# Patient Record
Sex: FEMALE | Race: WHITE | NOT HISPANIC OR LATINO | Employment: OTHER | URBAN - METROPOLITAN AREA
[De-identification: names, ages, dates, MRNs, and addresses within clinical notes are randomized per-mention and may not be internally consistent; named-entity substitution may affect disease eponyms.]

---

## 2023-08-15 ENCOUNTER — OFFICE VISIT (OUTPATIENT)
Dept: PRIMARY CARE | Facility: CLINIC | Age: 63
End: 2023-08-15
Payer: COMMERCIAL

## 2023-08-15 VITALS
SYSTOLIC BLOOD PRESSURE: 118 MMHG | DIASTOLIC BLOOD PRESSURE: 80 MMHG | OXYGEN SATURATION: 95 % | RESPIRATION RATE: 19 BRPM | TEMPERATURE: 97 F | HEART RATE: 56 BPM

## 2023-08-15 DIAGNOSIS — E66.01 CLASS 3 SEVERE OBESITY DUE TO EXCESS CALORIES WITH SERIOUS COMORBIDITY AND BODY MASS INDEX (BMI) OF 40.0 TO 44.9 IN ADULT (MULTI): ICD-10-CM

## 2023-08-15 DIAGNOSIS — I10 PRIMARY HYPERTENSION: ICD-10-CM

## 2023-08-15 DIAGNOSIS — M45.6 ANKYLOSING SPONDYLITIS OF LUMBAR REGION (MULTI): ICD-10-CM

## 2023-08-15 DIAGNOSIS — E78.2 MIXED HYPERLIPIDEMIA: Primary | ICD-10-CM

## 2023-08-15 DIAGNOSIS — M1A.00X0 CHRONIC IDIOPATHIC GOUT: ICD-10-CM

## 2023-08-15 DIAGNOSIS — I10 ESSENTIAL HYPERTENSION: ICD-10-CM

## 2023-08-15 DIAGNOSIS — Z12.31 ENCOUNTER FOR SCREENING MAMMOGRAM FOR MALIGNANT NEOPLASM OF BREAST: ICD-10-CM

## 2023-08-15 DIAGNOSIS — I47.29 VENTRICULAR TACHYCARDIA, PAROXYSMAL (MULTI): ICD-10-CM

## 2023-08-15 DIAGNOSIS — I44.2 IDIOVENTRICULAR RHYTHM (MULTI): ICD-10-CM

## 2023-08-15 DIAGNOSIS — E03.9 ACQUIRED HYPOTHYROIDISM: ICD-10-CM

## 2023-08-15 PROBLEM — I47.20 VENTRICULAR TACHYCARDIA, PAROXYSMAL: Status: ACTIVE | Noted: 2023-08-15

## 2023-08-15 PROBLEM — I49.3 PVC (PREMATURE VENTRICULAR CONTRACTION): Status: ACTIVE | Noted: 2023-08-15

## 2023-08-15 PROBLEM — R20.0 NUMBNESS OF FACE: Status: ACTIVE | Noted: 2023-08-15

## 2023-08-15 PROBLEM — I25.10 CAD S/P PERCUTANEOUS CORONARY ANGIOPLASTY: Status: ACTIVE | Noted: 2023-08-15

## 2023-08-15 PROBLEM — H90.3 BILATERAL SENSORINEURAL HEARING LOSS: Status: ACTIVE | Noted: 2023-08-15

## 2023-08-15 PROBLEM — Z98.1 HISTORY OF FUSION OF CERVICAL SPINE: Status: ACTIVE | Noted: 2018-01-17

## 2023-08-15 PROBLEM — M43.22 CERVICAL SPINE ANKYLOSIS: Status: ACTIVE | Noted: 2017-12-03

## 2023-08-15 PROBLEM — G89.29 CHRONIC ANKLE PAIN, BILATERAL: Status: ACTIVE | Noted: 2017-11-29

## 2023-08-15 PROBLEM — Z98.61 CAD S/P PERCUTANEOUS CORONARY ANGIOPLASTY: Status: ACTIVE | Noted: 2023-08-15

## 2023-08-15 PROBLEM — I25.10 CORONARY ARTERY DISEASE INVOLVING NATIVE HEART WITHOUT ANGINA PECTORIS: Status: ACTIVE | Noted: 2017-10-03

## 2023-08-15 PROBLEM — E87.6 HYPOKALEMIA: Status: ACTIVE | Noted: 2023-08-15

## 2023-08-15 PROBLEM — K21.9 GASTROESOPHAGEAL REFLUX DISEASE: Status: ACTIVE | Noted: 2018-08-01

## 2023-08-15 PROBLEM — I25.2 STATUS POST MYOCARDIAL INFARCTION: Status: ACTIVE | Noted: 2023-08-15

## 2023-08-15 PROBLEM — G47.33 OBSTRUCTIVE SLEEP APNEA, ADULT: Status: ACTIVE | Noted: 2023-08-15

## 2023-08-15 PROBLEM — E55.9 VITAMIN D DEFICIENCY: Status: ACTIVE | Noted: 2017-11-29

## 2023-08-15 PROBLEM — M25.572 CHRONIC ANKLE PAIN, BILATERAL: Status: ACTIVE | Noted: 2017-11-29

## 2023-08-15 PROBLEM — G89.4 CHRONIC PAIN SYNDROME: Status: ACTIVE | Noted: 2017-11-29

## 2023-08-15 PROBLEM — R09.89 BRUIT OF LEFT CAROTID ARTERY: Status: ACTIVE | Noted: 2023-08-15

## 2023-08-15 PROBLEM — M54.40 LOW BACK PAIN WITH SCIATICA: Status: ACTIVE | Noted: 2023-08-15

## 2023-08-15 PROBLEM — M25.571 CHRONIC ANKLE PAIN, BILATERAL: Status: ACTIVE | Noted: 2017-11-29

## 2023-08-15 PROBLEM — E66.813 CLASS 3 SEVERE OBESITY DUE TO EXCESS CALORIES WITH SERIOUS COMORBIDITY AND BODY MASS INDEX (BMI) OF 40.0 TO 44.9 IN ADULT: Status: ACTIVE | Noted: 2023-08-15

## 2023-08-15 PROBLEM — Q89.9: Status: ACTIVE | Noted: 2023-08-15

## 2023-08-15 PROBLEM — R07.9 CHEST PAIN: Status: ACTIVE | Noted: 2023-08-15

## 2023-08-15 PROBLEM — H61.20 CERUMEN IMPACTION: Status: ACTIVE | Noted: 2023-08-15

## 2023-08-15 PROCEDURE — 99202 OFFICE O/P NEW SF 15 MIN: CPT | Performed by: FAMILY MEDICINE

## 2023-08-15 PROCEDURE — 3079F DIAST BP 80-89 MM HG: CPT | Performed by: FAMILY MEDICINE

## 2023-08-15 PROCEDURE — 3008F BODY MASS INDEX DOCD: CPT | Performed by: FAMILY MEDICINE

## 2023-08-15 PROCEDURE — 1036F TOBACCO NON-USER: CPT | Performed by: FAMILY MEDICINE

## 2023-08-15 PROCEDURE — 3074F SYST BP LT 130 MM HG: CPT | Performed by: FAMILY MEDICINE

## 2023-08-15 RX ORDER — ATORVASTATIN CALCIUM 80 MG/1
80 TABLET, FILM COATED ORAL DAILY
COMMUNITY

## 2023-08-15 RX ORDER — LEVOTHYROXINE SODIUM 137 UG/1
137 TABLET ORAL DAILY
COMMUNITY
End: 2023-10-20 | Stop reason: DRUGHIGH

## 2023-08-15 RX ORDER — ALLOPURINOL 300 MG/1
300 TABLET ORAL DAILY
COMMUNITY

## 2023-08-15 RX ORDER — ACETAMINOPHEN 325 MG/1
650 TABLET ORAL 2 TIMES DAILY
COMMUNITY

## 2023-08-15 RX ORDER — NAPROXEN SODIUM 220 MG/1
81 TABLET, FILM COATED ORAL
COMMUNITY

## 2023-08-15 RX ORDER — PANTOPRAZOLE SODIUM 40 MG/1
40 TABLET, DELAYED RELEASE ORAL DAILY
COMMUNITY

## 2023-08-15 RX ORDER — HYDROCHLOROTHIAZIDE 25 MG/1
25 TABLET ORAL DAILY
COMMUNITY
End: 2024-02-16 | Stop reason: SDUPTHER

## 2023-08-15 RX ORDER — ISOSORBIDE MONONITRATE 30 MG/1
30 TABLET, EXTENDED RELEASE ORAL DAILY
COMMUNITY

## 2023-08-15 RX ORDER — POTASSIUM CHLORIDE 750 MG/1
10 TABLET, EXTENDED RELEASE ORAL 2 TIMES DAILY
COMMUNITY

## 2023-08-15 RX ORDER — MELOXICAM 15 MG/1
15 TABLET ORAL DAILY
COMMUNITY

## 2023-08-15 RX ORDER — METOPROLOL TARTRATE 25 MG/1
25 TABLET, FILM COATED ORAL
COMMUNITY
End: 2024-01-04 | Stop reason: SDUPTHER

## 2023-08-15 RX ORDER — NITROGLYCERIN 0.4 MG/1
0.4 TABLET SUBLINGUAL AS NEEDED
COMMUNITY
End: 2024-05-23 | Stop reason: SDUPTHER

## 2023-08-15 RX ORDER — LISINOPRIL 5 MG/1
5 TABLET ORAL DAILY
COMMUNITY
End: 2024-02-16 | Stop reason: SDUPTHER

## 2023-08-15 RX ORDER — ALBUTEROL SULFATE 90 UG/1
2 AEROSOL, METERED RESPIRATORY (INHALATION) EVERY 4 HOURS PRN
COMMUNITY

## 2023-08-15 RX ORDER — DULOXETIN HYDROCHLORIDE 60 MG/1
60 CAPSULE, DELAYED RELEASE ORAL DAILY
COMMUNITY

## 2023-08-15 ASSESSMENT — PATIENT HEALTH QUESTIONNAIRE - PHQ9
SUM OF ALL RESPONSES TO PHQ9 QUESTIONS 1 AND 2: 0
2. FEELING DOWN, DEPRESSED OR HOPELESS: NOT AT ALL
1. LITTLE INTEREST OR PLEASURE IN DOING THINGS: NOT AT ALL

## 2023-08-15 NOTE — PATIENT INSTRUCTIONS
BMI was above normal measurement. Current weight:    Weight change since last visit (-) denotes wt loss     Weight loss needed to achieve BMI 25:   Lbs  Weight loss needed to achieve BMI 30:   Lbs  Provided instructions on dietary changes  Provided instructions on exercise  Advised to Increase physical activity.

## 2023-08-15 NOTE — PROGRESS NOTES
Chief Complaint   Patient presents with    [facility]     New Patient visit       She  presents to establish care and an annual physical exam and other chronic medical conditions.    Patient is here for follow-up on hypertension and hyperlipidemia. She is not exercising and is adherent to a low-salt diet. Patient denies chest pain, dyspnea, exertional chest pressure/discomfort, near-syncope, orthopnea, palpitations, paroxysmal nocturnal dyspnea, and syncope. Taking his medication regularly with no side effects.     Patient Active Problem List   Diagnosis    Bilateral sensorineural hearing loss    Bruit of left carotid artery    CAD S/P percutaneous coronary angioplasty    Cerumen impaction    Chest pain    Hypokalemia    Idioventricular rhythm (CMS/HCC)    Low back pain with sciatica    Malformation    Mixed hyperlipidemia    Numbness of face    Obstructive sleep apnea, adult    Primary hypertension    PVC (premature ventricular contraction)    Status post myocardial infarction    Ventricular tachycardia, paroxysmal (CMS/HCC)    Class 3 severe obesity due to excess calories with serious comorbidity and body mass index (BMI) of 40.0 to 44.9 in adult (CMS/HCC)    Ankylosing spondylitis (CMS/HCC)    Cervical spine ankylosis    Spinal stenosis in cervical region    Cervicalgia    Chronic ankle pain, bilateral    Chronic pain syndrome    Congenital fusion of spine (vertebra)    Coronary artery disease involving native heart without angina pectoris    Degeneration of cervical intervertebral disc    Gastroesophageal reflux disease    History of fusion of cervical spine    Hypothyroidism    Vitamin D deficiency     has a current medication list which includes the following prescription(s): acetaminophen, albuterol, allopurinol, aspirin, atorvastatin, duloxetine, hydrochlorothiazide, isosorbide mononitrate er, levothyroxine, lisinopril, meloxicam, metoprolol tartrate, nitroglycerin, pantoprazole, and potassium chloride  cr.    No past medical history on file.    Past Surgical History:   Procedure Laterality Date    OTHER SURGICAL HISTORY  01/13/2022    Surgery    OTHER SURGICAL HISTORY  01/13/2022    Cardiac catheterization with stent placement    OTHER SURGICAL HISTORY  01/13/2022    Colonoscopy    OTHER SURGICAL HISTORY  01/13/2022    Median nerve neuroplasty    OTHER SURGICAL HISTORY  01/13/2022    Percutaneous transluminal coronary angioplasty    OTHER SURGICAL HISTORY  01/13/2022    Nephrectomy    OTHER SURGICAL HISTORY  01/13/2022    Neck surgery    OTHER SURGICAL HISTORY  01/13/2022    Foot surgery    OTHER SURGICAL HISTORY  01/13/2022    Tubal ligation     family history is not on file.    Allergies   Allergen Reactions    Hydrocodone Other    Iodine Other    Penicillins Hives     Review of Systems -   General ROS: negative for - fatigue, fever, malaise, night sweats or weight loss  Eyes ROS no blurred vision, no double vision, no eye discharge and no eye redness.  ENT ROS: negative for - hearing change, nasal discharge, oral lesions, sinus pain, sore throat, tinnitus or vertigo  Respiratory ROS: negative for - cough, hemoptysis, pleuritic pain, shortness of breath or wheezing  Cardiovascular ROS: no chest pain or dyspnea on exertion, palpitations, PND or orthopnea.  No syncope.  Gastrointestinal ROS: no abdominal pain, change in bowel habits, or black or bloody stools  Genito-Urinary ROS: no dysuria, trouble voiding, or hematuria  Dermatological ROS: negative for - dry skin, lumps, pruritus or rash  Neurological ROS: negative for - dizziness, gait disturbance, headaches, impaired coordination/balance, numbness/tingling, tremors or visual changes  Endocrine ROS: negative for - hot flashes, malaise/lethargy, palpitations, polydipsia/polyuria, skin changes, temperature intolerance   Hematological and Lymphatic ROS: negative for - bruising, night sweats or pallor    Blood pressure 118/80, pulse 56, temperature 36.1 °C (97 °F),  resp. rate 19, SpO2 95 %.    Physical Examination:   General appearance - alert, well appearing, and in no distress  HEENT EOMI, PEERLA, normal eyelids.  Oropharynx no erythema or exudate.  Normal tonsils.    Ears -external auditory canal normal bilaterally.  Tympanic membranes normal bilaterally.  Mouth - mucous membranes moist, pharynx normal without lesions  Neck - supple, trachea central no thyromegaly.  No significant adenopathy  Chest -good air entry bilaterally, no rhonchi rales and no wheezes.  Heart -Normal S1 and S2, regular rate and rhythm with no murmurs gallop or rub.  Abdomen -Non distended, soft, nontender with no guarding, no palpable mass and no CVA tenderness.  Bowel sounds normal.  No hernia.  Skin no rash, nonicteric and warm to touch.  Neurological - alert, oriented, cranial nerves II through XII normal.  Reflexes 2+ bilaterally.  No focal deficit.  Musculoskeletal - no joint tenderness, deformity or swelling  Extremities: peripheral pulses normal, no clubbing or cyanosis.    Problem List Items Addressed This Visit       Idioventricular rhythm (CMS/HCC)    Current Assessment & Plan     Chronic Condition Documentation : Stable based on symptoms and exam.  Continue established treatment plan and follow-up at least yearly.           Relevant Medications    nitroglycerin (Nitrostat) 0.4 mg SL tablet    metoprolol tartrate (Lopressor) 25 mg tablet    isosorbide mononitrate ER (Imdur) 30 mg 24 hr tablet    Mixed hyperlipidemia - Primary    Current Assessment & Plan     The nature of cardiac risk has been fully discussed with this patient. Discussed cardiovascular risk analysis and appropriate diet with the need for lifelong measures to reduce the risk. A regular exercise program is recommended to help achieve and maintain normal body weight, fitness and improve lipid balance. Patient education provided. They understand and agree with this course of treatment. They will return with new or worsening  "symptoms. Patient instructed to remain current with appropriate annual health maintenance.            Relevant Orders    CBC and Auto Differential    Comprehensive metabolic panel    Lipid panel    Follow Up In [facility] - PCP - Established    Primary hypertension    Current Assessment & Plan     Dietary sodium restriction.  Regular aerobic exercise program is recommended to help achieve and maintain normal body weight, fitness and improve lipid balance. .  Dietary changes: Increase soluble fiber  Plant sterols 2grams per day (e.g. Benecol)  Reduce saturated fat, \"trans\" monounsaturated fatty acids, and cholesterol           Relevant Orders    CBC and Auto Differential    Comprehensive metabolic panel    Lipid panel    Follow Up In [facility] - PCP - Established    Ventricular tachycardia, paroxysmal (CMS/HCC)    Current Assessment & Plan     Chronic Condition Documentation : Stable based on symptoms and exam.  Continue established treatment plan and follow-up at least yearly.           Relevant Medications    nitroglycerin (Nitrostat) 0.4 mg SL tablet    metoprolol tartrate (Lopressor) 25 mg tablet    isosorbide mononitrate ER (Imdur) 30 mg 24 hr tablet    Class 3 severe obesity due to excess calories with serious comorbidity and body mass index (BMI) of 40.0 to 44.9 in adult (CMS/[facility])    Current Assessment & Plan     Continue decrease calorie diet and not more than 1500 calorie per day diet and low-fat diet.  Continue with regular exercise program.  We advised exercise at least 5 days a week for at least 45 minutes and also a minimum of 10,000 steps a day.  The detrimental effects of obesity on health were discussed.           Ankylosing spondylitis (CMS/[facility])    Current Assessment & Plan     Chronic Condition Documentation : Stable based on symptoms and exam.  Continue established treatment plan and follow-up at least yearly.           Hypothyroidism    Current Assessment & Plan     Will " wait to see TSH and T4 result before changing the Levothyroxine dose.         Relevant Orders    TSH    Thyroxine, Free    Follow Up In [facility] - PCP - Established     Other Visit Diagnoses       Encounter for screening mammogram for malignant neoplasm of breast        Relevant Orders    BI mammo bilateral screening tomosynthesis    Chronic idiopathic gout        Essential hypertension               Outpatient Encounter Medications as of 8/15/2023   Medication Sig Dispense Refill    acetaminophen (Tylenol) 325 mg tablet Take 2 tablets (650 mg) by mouth 2 times a day.      albuterol 90 mcg/actuation inhaler Inhale 2 puffs every 4 hours if needed for wheezing or shortness of breath.      allopurinol (Zyloprim) 300 mg tablet Take 1 tablet (300 mg) by mouth once daily.      aspirin 81 mg chewable tablet Chew 1 tablet (81 mg) once daily.      atorvastatin (Lipitor) 80 mg tablet Take 1 tablet (80 mg) by mouth once daily.      DULoxetine (Cymbalta) 60 mg DR capsule Take 1 capsule (60 mg) by mouth once daily.      hydroCHLOROthiazide (HYDRODiuril) 25 mg tablet Take 1 tablet (25 mg) by mouth once daily.      isosorbide mononitrate ER (Imdur) 30 mg 24 hr tablet Take 1 tablet (30 mg) by mouth once daily.      levothyroxine (Synthroid, Levoxyl) 137 mcg tablet Take 1 tablet (137 mcg) by mouth once daily.      lisinopril 5 mg tablet Take 1 tablet (5 mg) by mouth once daily.      meloxicam (Mobic) 15 mg tablet Take 1 tablet (15 mg) by mouth once daily.      metoprolol tartrate (Lopressor) 25 mg tablet Take 1 tablet (25 mg) by mouth 2 times a day.      nitroglycerin (Nitrostat) 0.4 mg SL tablet Place 1 tablet (0.4 mg) under the tongue if needed for chest pain.      pantoprazole (ProtoNix) 40 mg EC tablet Take 1 tablet (40 mg) by mouth once daily.      potassium chloride CR 10 mEq ER tablet Take 1 tablet (10 mEq) by mouth once daily.       No facility-administered encounter medications on file as of 8/15/2023.     The  nature of cardiac risk has been fully discussed with this patient. I have made  aware of  LDL target goal given  cardiovascular risk analysis. I have discussed the appropriate diet. The need for lifelong compliance in order to reduce risk is stressed. A regular exercise program is recommended to help achieve and maintain normal body weight, fitness and improve lipid balance. A written copy of a low fat, low cholesterol diet has been given to the patient.    Patient education provided. They understand and agree with this course of treatment. They will return with new or worsening symptoms. Patient instructed to remain current with appropriate annual health maintenance.     Scribe Attestation  By signing my name below, IHiral Scribe   attest that this documentation has been prepared under the direction and in the presence of Niall Miranda MD.

## 2023-10-09 ENCOUNTER — HOSPITAL ENCOUNTER (OUTPATIENT)
Dept: RADIOLOGY | Facility: HOSPITAL | Age: 63
Discharge: HOME | End: 2023-10-09
Payer: MEDICARE

## 2023-10-09 DIAGNOSIS — Z12.31 ENCOUNTER FOR SCREENING MAMMOGRAM FOR MALIGNANT NEOPLASM OF BREAST: ICD-10-CM

## 2023-10-09 PROCEDURE — 77067 SCR MAMMO BI INCL CAD: CPT | Mod: 50

## 2023-10-09 PROCEDURE — 77063 BREAST TOMOSYNTHESIS BI: CPT | Mod: 50

## 2023-10-09 PROCEDURE — 77067 SCR MAMMO BI INCL CAD: CPT | Mod: BILATERAL PROCEDURE | Performed by: RADIOLOGY

## 2023-10-09 PROCEDURE — 77063 BREAST TOMOSYNTHESIS BI: CPT | Mod: BILATERAL PROCEDURE | Performed by: RADIOLOGY

## 2023-10-10 ENCOUNTER — HOSPITAL ENCOUNTER (OUTPATIENT)
Dept: RADIOLOGY | Facility: EXTERNAL LOCATION | Age: 63
Discharge: HOME | End: 2023-10-10
Payer: MEDICARE

## 2023-10-10 DIAGNOSIS — Z12.31 ENCOUNTER FOR SCREENING MAMMOGRAM FOR MALIGNANT NEOPLASM OF BREAST: ICD-10-CM

## 2023-10-16 ENCOUNTER — APPOINTMENT (OUTPATIENT)
Dept: PRIMARY CARE | Facility: CLINIC | Age: 63
End: 2023-10-16
Payer: MEDICARE

## 2023-10-16 ENCOUNTER — LAB (OUTPATIENT)
Dept: LAB | Facility: LAB | Age: 63
End: 2023-10-16
Payer: MEDICARE

## 2023-10-16 DIAGNOSIS — E03.9 ACQUIRED HYPOTHYROIDISM: ICD-10-CM

## 2023-10-16 DIAGNOSIS — E78.2 MIXED HYPERLIPIDEMIA: ICD-10-CM

## 2023-10-16 DIAGNOSIS — I10 PRIMARY HYPERTENSION: ICD-10-CM

## 2023-10-16 LAB
ALBUMIN SERPL BCP-MCNC: 4 G/DL (ref 3.4–5)
ALP SERPL-CCNC: 72 U/L (ref 33–136)
ALT SERPL W P-5'-P-CCNC: 15 U/L (ref 7–45)
ANION GAP SERPL CALC-SCNC: 11 MMOL/L (ref 10–20)
AST SERPL W P-5'-P-CCNC: 14 U/L (ref 9–39)
BASOPHILS # BLD AUTO: 0.07 X10*3/UL (ref 0–0.1)
BASOPHILS NFR BLD AUTO: 1.2 %
BILIRUB SERPL-MCNC: 1.1 MG/DL (ref 0–1.2)
BUN SERPL-MCNC: 21 MG/DL (ref 6–23)
CALCIUM SERPL-MCNC: 9.7 MG/DL (ref 8.6–10.3)
CHLORIDE SERPL-SCNC: 103 MMOL/L (ref 98–107)
CHOLEST SERPL-MCNC: 133 MG/DL (ref 0–199)
CHOLESTEROL/HDL RATIO: 3.4
CO2 SERPL-SCNC: 29 MMOL/L (ref 21–32)
CREAT SERPL-MCNC: 1.01 MG/DL (ref 0.5–1.05)
EOSINOPHIL # BLD AUTO: 0.21 X10*3/UL (ref 0–0.7)
EOSINOPHIL NFR BLD AUTO: 3.6 %
ERYTHROCYTE [DISTWIDTH] IN BLOOD BY AUTOMATED COUNT: 14.3 % (ref 11.5–14.5)
GFR SERPL CREATININE-BSD FRML MDRD: 62 ML/MIN/1.73M*2
GLUCOSE SERPL-MCNC: 108 MG/DL (ref 74–99)
HCT VFR BLD AUTO: 38.9 % (ref 36–46)
HDLC SERPL-MCNC: 38.8 MG/DL
HGB BLD-MCNC: 12.3 G/DL (ref 12–16)
IMM GRANULOCYTES # BLD AUTO: 0.01 X10*3/UL (ref 0–0.7)
IMM GRANULOCYTES NFR BLD AUTO: 0.2 % (ref 0–0.9)
LDLC SERPL CALC-MCNC: 44 MG/DL
LYMPHOCYTES # BLD AUTO: 2.01 X10*3/UL (ref 1.2–4.8)
LYMPHOCYTES NFR BLD AUTO: 34.2 %
MCH RBC QN AUTO: 30.4 PG (ref 26–34)
MCHC RBC AUTO-ENTMCNC: 31.6 G/DL (ref 32–36)
MCV RBC AUTO: 96 FL (ref 80–100)
MONOCYTES # BLD AUTO: 0.41 X10*3/UL (ref 0.1–1)
MONOCYTES NFR BLD AUTO: 7 %
NEUTROPHILS # BLD AUTO: 3.17 X10*3/UL (ref 1.2–7.7)
NEUTROPHILS NFR BLD AUTO: 53.8 %
NON HDL CHOLESTEROL: 94 MG/DL (ref 0–149)
NRBC BLD-RTO: 0 /100 WBCS (ref 0–0)
PLATELET # BLD AUTO: 256 X10*3/UL (ref 150–450)
PMV BLD AUTO: 11.3 FL (ref 7.5–11.5)
POTASSIUM SERPL-SCNC: 4.3 MMOL/L (ref 3.5–5.3)
PROT SERPL-MCNC: 7.1 G/DL (ref 6.4–8.2)
RBC # BLD AUTO: 4.04 X10*6/UL (ref 4–5.2)
SODIUM SERPL-SCNC: 139 MMOL/L (ref 136–145)
T4 FREE SERPL-MCNC: 1.27 NG/DL (ref 0.61–1.12)
TRIGL SERPL-MCNC: 251 MG/DL (ref 0–149)
TSH SERPL-ACNC: 0.01 MIU/L (ref 0.44–3.98)
VLDL: 50 MG/DL (ref 0–40)
WBC # BLD AUTO: 5.9 X10*3/UL (ref 4.4–11.3)

## 2023-10-16 PROCEDURE — 36415 COLL VENOUS BLD VENIPUNCTURE: CPT

## 2023-10-16 PROCEDURE — 85025 COMPLETE CBC W/AUTO DIFF WBC: CPT

## 2023-10-16 PROCEDURE — 80053 COMPREHEN METABOLIC PANEL: CPT

## 2023-10-16 PROCEDURE — 84439 ASSAY OF FREE THYROXINE: CPT

## 2023-10-16 PROCEDURE — 80061 LIPID PANEL: CPT

## 2023-10-16 PROCEDURE — 84443 ASSAY THYROID STIM HORMONE: CPT

## 2023-10-20 ENCOUNTER — OFFICE VISIT (OUTPATIENT)
Dept: PRIMARY CARE | Facility: CLINIC | Age: 63
End: 2023-10-20
Payer: MEDICARE

## 2023-10-20 VITALS
WEIGHT: 242.4 LBS | OXYGEN SATURATION: 97 % | HEIGHT: 64 IN | RESPIRATION RATE: 15 BRPM | SYSTOLIC BLOOD PRESSURE: 124 MMHG | BODY MASS INDEX: 41.38 KG/M2 | TEMPERATURE: 97.1 F | DIASTOLIC BLOOD PRESSURE: 64 MMHG | HEART RATE: 61 BPM

## 2023-10-20 DIAGNOSIS — E78.2 MIXED HYPERLIPIDEMIA: ICD-10-CM

## 2023-10-20 DIAGNOSIS — I10 ESSENTIAL HYPERTENSION: ICD-10-CM

## 2023-10-20 DIAGNOSIS — E66.01 MORBID OBESITY WITH BMI OF 40.0-44.9, ADULT (MULTI): ICD-10-CM

## 2023-10-20 DIAGNOSIS — M20.41 HAMMER TOE OF RIGHT FOOT: ICD-10-CM

## 2023-10-20 DIAGNOSIS — E03.9 ACQUIRED HYPOTHYROIDISM: ICD-10-CM

## 2023-10-20 DIAGNOSIS — Z00.00 WELCOME TO MEDICARE PREVENTIVE VISIT: Primary | ICD-10-CM

## 2023-10-20 DIAGNOSIS — R20.0 NUMBNESS AND TINGLING OF BOTH FEET: ICD-10-CM

## 2023-10-20 DIAGNOSIS — G47.33 OBSTRUCTIVE SLEEP APNEA ON CPAP: ICD-10-CM

## 2023-10-20 DIAGNOSIS — D22.9 MULTIPLE NEVI: ICD-10-CM

## 2023-10-20 DIAGNOSIS — R20.2 NUMBNESS AND TINGLING OF BOTH FEET: ICD-10-CM

## 2023-10-20 DIAGNOSIS — Z23 NEED FOR INFLUENZA VACCINATION: ICD-10-CM

## 2023-10-20 PROCEDURE — G0008 ADMIN INFLUENZA VIRUS VAC: HCPCS | Performed by: FAMILY MEDICINE

## 2023-10-20 PROCEDURE — 3008F BODY MASS INDEX DOCD: CPT | Performed by: FAMILY MEDICINE

## 2023-10-20 PROCEDURE — 3078F DIAST BP <80 MM HG: CPT | Performed by: FAMILY MEDICINE

## 2023-10-20 PROCEDURE — 3074F SYST BP LT 130 MM HG: CPT | Performed by: FAMILY MEDICINE

## 2023-10-20 PROCEDURE — 90686 IIV4 VACC NO PRSV 0.5 ML IM: CPT | Performed by: FAMILY MEDICINE

## 2023-10-20 PROCEDURE — 1036F TOBACCO NON-USER: CPT | Performed by: FAMILY MEDICINE

## 2023-10-20 PROCEDURE — 99214 OFFICE O/P EST MOD 30 MIN: CPT | Performed by: FAMILY MEDICINE

## 2023-10-20 PROCEDURE — G0402 INITIAL PREVENTIVE EXAM: HCPCS | Performed by: FAMILY MEDICINE

## 2023-10-20 RX ORDER — LEVOTHYROXINE SODIUM 125 UG/1
125 TABLET ORAL DAILY
Qty: 30 TABLET | Refills: 3 | Status: SHIPPED | OUTPATIENT
Start: 2023-10-20 | End: 2024-02-05 | Stop reason: SDUPTHER

## 2023-10-20 ASSESSMENT — VISUAL ACUITY
OS_CC: 20/15
OD_CC: 20/10

## 2023-10-20 ASSESSMENT — ENCOUNTER SYMPTOMS
DEPRESSION: 0
LOSS OF SENSATION IN FEET: 1
OCCASIONAL FEELINGS OF UNSTEADINESS: 1

## 2023-10-20 ASSESSMENT — PATIENT HEALTH QUESTIONNAIRE - PHQ9
2. FEELING DOWN, DEPRESSED OR HOPELESS: NOT AT ALL
1. LITTLE INTEREST OR PLEASURE IN DOING THINGS: NOT AT ALL
SUM OF ALL RESPONSES TO PHQ9 QUESTIONS 1 AND 2: 0

## 2023-10-20 ASSESSMENT — ACTIVITIES OF DAILY LIVING (ADL)
DRESSING: INDEPENDENT
BATHING: INDEPENDENT
GROCERY_SHOPPING: INDEPENDENT
DOING_HOUSEWORK: INDEPENDENT
MANAGING_FINANCES: INDEPENDENT
TAKING_MEDICATION: INDEPENDENT

## 2023-10-20 NOTE — PATIENT INSTRUCTIONS
BMI was above normal measurement. Current weight: 110 kg (242 lb 6.4 oz)  Weight change since last visit (-) denotes wt loss 7.4 lbs   Weight loss needed to achieve BMI 25: 97.1 Lbs  Weight loss needed to achieve BMI 30: 68 Lbs  Advised to Increase physical activity.

## 2023-10-20 NOTE — ASSESSMENT & PLAN NOTE
We will reduce the dose of the Levothyroxine to 125 mcg daily and recheck her thyroid function in 3 months.

## 2023-11-21 ENCOUNTER — OFFICE VISIT (OUTPATIENT)
Dept: CARDIOLOGY | Facility: CLINIC | Age: 63
End: 2023-11-21
Payer: MEDICARE

## 2023-11-21 VITALS
HEART RATE: 64 BPM | BODY MASS INDEX: 41.68 KG/M2 | DIASTOLIC BLOOD PRESSURE: 80 MMHG | WEIGHT: 242.8 LBS | SYSTOLIC BLOOD PRESSURE: 124 MMHG

## 2023-11-21 DIAGNOSIS — E78.2 MIXED HYPERLIPIDEMIA: ICD-10-CM

## 2023-11-21 DIAGNOSIS — E66.01 MORBID OBESITY WITH BMI OF 40.0-44.9, ADULT (MULTI): ICD-10-CM

## 2023-11-21 DIAGNOSIS — I47.29 VENTRICULAR TACHYCARDIA, PAROXYSMAL (MULTI): ICD-10-CM

## 2023-11-21 DIAGNOSIS — Z98.61 CAD S/P PERCUTANEOUS CORONARY ANGIOPLASTY: ICD-10-CM

## 2023-11-21 DIAGNOSIS — I25.2 STATUS POST MYOCARDIAL INFARCTION: ICD-10-CM

## 2023-11-21 DIAGNOSIS — I49.3 PVC (PREMATURE VENTRICULAR CONTRACTION): ICD-10-CM

## 2023-11-21 DIAGNOSIS — G47.33 OBSTRUCTIVE SLEEP APNEA ON CPAP: ICD-10-CM

## 2023-11-21 DIAGNOSIS — I25.10 CAD S/P PERCUTANEOUS CORONARY ANGIOPLASTY: ICD-10-CM

## 2023-11-21 DIAGNOSIS — Z78.9 NEVER SMOKED ANY SUBSTANCE: ICD-10-CM

## 2023-11-21 DIAGNOSIS — I10 ESSENTIAL HYPERTENSION: ICD-10-CM

## 2023-11-21 PROCEDURE — 1160F RVW MEDS BY RX/DR IN RCRD: CPT | Performed by: INTERNAL MEDICINE

## 2023-11-21 PROCEDURE — 3008F BODY MASS INDEX DOCD: CPT | Performed by: INTERNAL MEDICINE

## 2023-11-21 PROCEDURE — 1036F TOBACCO NON-USER: CPT | Performed by: INTERNAL MEDICINE

## 2023-11-21 PROCEDURE — 1159F MED LIST DOCD IN RCRD: CPT | Performed by: INTERNAL MEDICINE

## 2023-11-21 PROCEDURE — 3074F SYST BP LT 130 MM HG: CPT | Performed by: INTERNAL MEDICINE

## 2023-11-21 PROCEDURE — 99213 OFFICE O/P EST LOW 20 MIN: CPT | Performed by: INTERNAL MEDICINE

## 2023-11-21 PROCEDURE — 3079F DIAST BP 80-89 MM HG: CPT | Performed by: INTERNAL MEDICINE

## 2023-11-21 NOTE — PATIENT INSTRUCTIONS
Patient to follow up in 6 months with Dr. Sunny Byrnes MD [facility]    [place] with your trip!  Continue plan with Dr. Rome in January.     Continue same medications and treatments.   Patient educated on proper medication use.   Patient educated on risk factor modification.   Please bring any lab results from other providers / physicians to your next appointment.     Please bring all medicines, vitamins, and herbal supplements with you when you come to the office.     Prescriptions will not be filled unless you are compliant with your follow up appointments or have a follow up appointment scheduled as per instruction of your physician. Refills should be requested at the time of your visit.    Nate HERNADEZ RN am scribing for and in the presence of Dr. Sunny Byrnes MD [facility]

## 2023-11-21 NOTE — PROGRESS NOTES
Referred by Dr. Harper ref. provider found provider found for   Chief Complaint   Patient presents with    Follow-up     6 month        History of Present Illness  Kiara Ordaz is a 63 y.o. year old female patient with history of coronary artery disease status post post previous coronary angioplasty with stent [place].  She does have sleep apnea has been managed by pulmonary physician.  Did not have any symptoms of chest pain or shortness of breath.  She is going on trip on a cruise next week.  I discussed with the patient in length we will continue current medical management will call for any problem and follow-up as scheduled    Past Medical History  No past medical history on file.    Social History  Social History     Tobacco Use    Smoking status: Never    Smokeless tobacco: Never   Substance Use Topics    Alcohol use: Never    Drug use: Never       Family History     Family History   Problem Relation Name Age of Onset    Hypertension Mother      Other (coronary bypass) Mother      Coronary artery disease Mother      Lymphoma Father      Stroke Sister      Hypertension Sister      Diabetes type II Sister      Other (pacemaker) Sister      Stroke Brother      Coronary artery disease Brother      Diabetes type II Brother      Hypertension Brother         Review of Systems  As per HPI, all other systems reviewed and negative.    Allergies:  Allergies   Allergen Reactions    Hydrocodone Other    Iodine Other    Penicillins Hives        Outpatient Medications:  Current Outpatient Medications   Medication Instructions    acetaminophen (TYLENOL) 650 mg, oral, 2 times daily    albuterol 90 mcg/actuation inhaler 2 puffs, inhalation, Every 4 hours PRN    allopurinol (ZYLOPRIM) 300 mg, oral, Daily    aspirin 81 mg, oral, Daily RT    atorvastatin (LIPITOR) 80 mg, oral, Daily    DULoxetine (CYMBALTA) 60 mg, oral, Daily    hydroCHLOROthiazide (HYDRODIURIL) 25 mg, oral, Daily    isosorbide mononitrate ER (IMDUR) 30 mg,  oral, Daily    levothyroxine (SYNTHROID, LEVOXYL) 125 mcg, oral, Daily    lisinopril 5 mg, oral, Daily    meloxicam (MOBIC) 15 mg, oral, Daily    metoprolol tartrate (LOPRESSOR) 25 mg, oral, Take 2 tablets in am and 1 tablet in pm    nitroglycerin (NITROSTAT) 0.4 mg, sublingual, As needed    pantoprazole (PROTONIX) 40 mg, oral, Daily    potassium chloride CR 10 mEq ER tablet 10 mEq, oral, 2 times daily         Vitals:  Vitals:    11/21/23 1008   BP: 124/80   Pulse: 64       Physical Exam:  Physical Exam  Vitals and nursing note reviewed.   Constitutional:       Appearance: Normal appearance. She is normal weight.   HENT:      Head: Normocephalic and atraumatic.   Eyes:      Extraocular Movements: Extraocular movements intact.      Pupils: Pupils are equal, round, and reactive to light.   Cardiovascular:      Rate and Rhythm: Normal rate and regular rhythm.      Pulses: Normal pulses.   Pulmonary:      Effort: Pulmonary effort is normal.      Breath sounds: Normal breath sounds.   Musculoskeletal:      Cervical back: Normal range of motion.      Right lower leg: No edema.      Left lower leg: No edema.   Skin:     General: Skin is warm and dry.   Neurological:      General: No focal deficit present.      Mental Status: She is alert and oriented to person, place, and time.             Assessment/Plan   Diagnoses and all orders for this visit:  CAD S/P percutaneous coronary angioplasty  Essential hypertension  Mixed hyperlipidemia  Status post myocardial infarction  PVC (premature ventricular contraction)  Ventricular tachycardia, paroxysmal (CMS/HCC)  Morbid obesity with BMI of 40.0-44.9, adult (CMS/HCC)  Obstructive sleep apnea on CPAP  Never smoked any substance          Sunny Byrnes MD [facility]   [facility]     Thank you for allowing me to participate in the care of this patient. Please do not hesitate to contact me with any further questions or concerns.

## 2023-12-14 ENCOUNTER — HOSPITAL ENCOUNTER (OUTPATIENT)
Dept: NEUROLOGY | Facility: CLINIC | Age: 63
Discharge: HOME | End: 2023-12-14
Payer: MEDICARE

## 2023-12-14 DIAGNOSIS — R20.0 NUMBNESS AND TINGLING OF BOTH FEET: ICD-10-CM

## 2023-12-14 DIAGNOSIS — R20.2 NUMBNESS AND TINGLING OF BOTH FEET: ICD-10-CM

## 2023-12-14 PROCEDURE — 95912 NRV CNDJ TEST 11-12 STUDIES: CPT | Performed by: SPECIALIST

## 2023-12-14 PROCEDURE — 95886 MUSC TEST DONE W/N TEST COMP: CPT | Mod: 50 | Performed by: SPECIALIST

## 2023-12-14 PROCEDURE — 95886 MUSC TEST DONE W/N TEST COMP: CPT | Performed by: SPECIALIST

## 2024-01-03 ENCOUNTER — APPOINTMENT (OUTPATIENT)
Dept: PODIATRY | Facility: CLINIC | Age: 64
End: 2024-01-03
Payer: MEDICARE

## 2024-01-04 ENCOUNTER — APPOINTMENT (OUTPATIENT)
Dept: SLEEP MEDICINE | Facility: HOSPITAL | Age: 64
End: 2024-01-04
Payer: MEDICARE

## 2024-01-04 DIAGNOSIS — Z98.61 CAD S/P PERCUTANEOUS CORONARY ANGIOPLASTY: ICD-10-CM

## 2024-01-04 DIAGNOSIS — I10 ESSENTIAL HYPERTENSION: ICD-10-CM

## 2024-01-04 DIAGNOSIS — I25.10 CAD S/P PERCUTANEOUS CORONARY ANGIOPLASTY: ICD-10-CM

## 2024-01-04 RX ORDER — METOPROLOL TARTRATE 25 MG/1
TABLET, FILM COATED ORAL
Qty: 270 TABLET | Refills: 3 | Status: SHIPPED | OUTPATIENT
Start: 2024-01-04 | End: 2024-05-23

## 2024-01-04 NOTE — TELEPHONE ENCOUNTER
Received request for prescription refills for patient. Received electronic request from [facility] asking for 90day supply script of Metoprolol to be sent to local [facility].     Patient follows with Dr. Sunny Byrnes MD [facility]     Last OV 11/21/23  Next OV 5/2024    Pended for signing and sent to provider

## 2024-01-10 ENCOUNTER — HOSPITAL ENCOUNTER (EMERGENCY)
Facility: HOSPITAL | Age: 64
Discharge: HOME | End: 2024-01-10
Payer: MEDICARE

## 2024-01-10 ENCOUNTER — APPOINTMENT (OUTPATIENT)
Dept: RADIOLOGY | Facility: HOSPITAL | Age: 64
End: 2024-01-10
Payer: MEDICARE

## 2024-01-10 VITALS
BODY MASS INDEX: 40.97 KG/M2 | HEART RATE: 68 BPM | HEIGHT: 64 IN | TEMPERATURE: 97 F | OXYGEN SATURATION: 99 % | WEIGHT: 240 LBS | SYSTOLIC BLOOD PRESSURE: 198 MMHG | RESPIRATION RATE: 18 BRPM | DIASTOLIC BLOOD PRESSURE: 97 MMHG

## 2024-01-10 DIAGNOSIS — N12 PYELONEPHRITIS: Primary | ICD-10-CM

## 2024-01-10 LAB
ALBUMIN SERPL BCP-MCNC: 3.9 G/DL (ref 3.4–5)
ALP SERPL-CCNC: 68 U/L (ref 33–136)
ALT SERPL W P-5'-P-CCNC: 12 U/L (ref 7–45)
ANION GAP SERPL CALC-SCNC: 11 MMOL/L (ref 10–20)
APPEARANCE UR: ABNORMAL
AST SERPL W P-5'-P-CCNC: 13 U/L (ref 9–39)
BACTERIA #/AREA URNS AUTO: ABNORMAL /HPF
BASOPHILS # BLD AUTO: 0.05 X10*3/UL (ref 0–0.1)
BASOPHILS NFR BLD AUTO: 0.7 %
BILIRUB SERPL-MCNC: 0.5 MG/DL (ref 0–1.2)
BILIRUB UR STRIP.AUTO-MCNC: NEGATIVE MG/DL
BNP SERPL-MCNC: 63 PG/ML (ref 0–99)
BUN SERPL-MCNC: 22 MG/DL (ref 6–23)
CALCIUM SERPL-MCNC: 8.9 MG/DL (ref 8.6–10.3)
CHLORIDE SERPL-SCNC: 106 MMOL/L (ref 98–107)
CO2 SERPL-SCNC: 27 MMOL/L (ref 21–32)
COLOR UR: YELLOW
CREAT SERPL-MCNC: 1.12 MG/DL (ref 0.5–1.05)
EGFRCR SERPLBLD CKD-EPI 2021: 55 ML/MIN/1.73M*2
EOSINOPHIL # BLD AUTO: 0.24 X10*3/UL (ref 0–0.7)
EOSINOPHIL NFR BLD AUTO: 3.5 %
ERYTHROCYTE [DISTWIDTH] IN BLOOD BY AUTOMATED COUNT: 13.3 % (ref 11.5–14.5)
FLUAV RNA RESP QL NAA+PROBE: NOT DETECTED
FLUBV RNA RESP QL NAA+PROBE: NOT DETECTED
GLUCOSE SERPL-MCNC: 118 MG/DL (ref 74–99)
GLUCOSE UR STRIP.AUTO-MCNC: NEGATIVE MG/DL
HCT VFR BLD AUTO: 37.8 % (ref 36–46)
HGB BLD-MCNC: 12.9 G/DL (ref 12–16)
HOLD SPECIMEN: NORMAL
HOLD SPECIMEN: NORMAL
IMM GRANULOCYTES # BLD AUTO: 0.03 X10*3/UL (ref 0–0.7)
IMM GRANULOCYTES NFR BLD AUTO: 0.4 % (ref 0–0.9)
KETONES UR STRIP.AUTO-MCNC: NEGATIVE MG/DL
LACTATE SERPL-SCNC: 1 MMOL/L (ref 0.4–2)
LEUKOCYTE ESTERASE UR QL STRIP.AUTO: ABNORMAL
LIPASE SERPL-CCNC: 90 U/L (ref 9–82)
LYMPHOCYTES # BLD AUTO: 2.6 X10*3/UL (ref 1.2–4.8)
LYMPHOCYTES NFR BLD AUTO: 37.5 %
MCH RBC QN AUTO: 31.2 PG (ref 26–34)
MCHC RBC AUTO-ENTMCNC: 34.1 G/DL (ref 32–36)
MCV RBC AUTO: 91 FL (ref 80–100)
MONOCYTES # BLD AUTO: 0.37 X10*3/UL (ref 0.1–1)
MONOCYTES NFR BLD AUTO: 5.3 %
MUCOUS THREADS #/AREA URNS AUTO: ABNORMAL /LPF
NEUTROPHILS # BLD AUTO: 3.64 X10*3/UL (ref 1.2–7.7)
NEUTROPHILS NFR BLD AUTO: 52.6 %
NITRITE UR QL STRIP.AUTO: NEGATIVE
NRBC BLD-RTO: 0 /100 WBCS (ref 0–0)
PH UR STRIP.AUTO: 6 [PH]
PLATELET # BLD AUTO: 245 X10*3/UL (ref 150–450)
POTASSIUM SERPL-SCNC: 3.8 MMOL/L (ref 3.5–5.3)
PROT SERPL-MCNC: 6.9 G/DL (ref 6.4–8.2)
PROT UR STRIP.AUTO-MCNC: ABNORMAL MG/DL
RBC # BLD AUTO: 4.14 X10*6/UL (ref 4–5.2)
RBC # UR STRIP.AUTO: NEGATIVE /UL
RBC #/AREA URNS AUTO: ABNORMAL /HPF
RSV RNA RESP QL NAA+PROBE: NOT DETECTED
SARS-COV-2 RNA RESP QL NAA+PROBE: NOT DETECTED
SODIUM SERPL-SCNC: 140 MMOL/L (ref 136–145)
SP GR UR STRIP.AUTO: 1.02
SQUAMOUS #/AREA URNS AUTO: ABNORMAL /HPF
UROBILINOGEN UR STRIP.AUTO-MCNC: <2 MG/DL
WBC # BLD AUTO: 6.9 X10*3/UL (ref 4.4–11.3)
WBC #/AREA URNS AUTO: ABNORMAL /HPF

## 2024-01-10 PROCEDURE — 71275 CT ANGIOGRAPHY CHEST: CPT | Mod: FOREIGN READ | Performed by: RADIOLOGY

## 2024-01-10 PROCEDURE — 99285 EMERGENCY DEPT VISIT HI MDM: CPT

## 2024-01-10 PROCEDURE — 84075 ASSAY ALKALINE PHOSPHATASE: CPT | Performed by: PHYSICIAN ASSISTANT

## 2024-01-10 PROCEDURE — 85025 COMPLETE CBC W/AUTO DIFF WBC: CPT | Performed by: PHYSICIAN ASSISTANT

## 2024-01-10 PROCEDURE — 87086 URINE CULTURE/COLONY COUNT: CPT | Mod: ELYLAB | Performed by: PHYSICIAN ASSISTANT

## 2024-01-10 PROCEDURE — 81001 URINALYSIS AUTO W/SCOPE: CPT | Performed by: PHYSICIAN ASSISTANT

## 2024-01-10 PROCEDURE — 83690 ASSAY OF LIPASE: CPT | Performed by: PHYSICIAN ASSISTANT

## 2024-01-10 PROCEDURE — 2550000001 HC RX 255 CONTRASTS: Performed by: PHYSICIAN ASSISTANT

## 2024-01-10 PROCEDURE — 36415 COLL VENOUS BLD VENIPUNCTURE: CPT | Performed by: PHYSICIAN ASSISTANT

## 2024-01-10 PROCEDURE — 87637 SARSCOV2&INF A&B&RSV AMP PRB: CPT | Performed by: PHYSICIAN ASSISTANT

## 2024-01-10 PROCEDURE — 74177 CT ABD & PELVIS W/CONTRAST: CPT | Mod: FOREIGN READ | Performed by: RADIOLOGY

## 2024-01-10 PROCEDURE — 2500000004 HC RX 250 GENERAL PHARMACY W/ HCPCS (ALT 636 FOR OP/ED): Performed by: PHYSICIAN ASSISTANT

## 2024-01-10 PROCEDURE — 83605 ASSAY OF LACTIC ACID: CPT | Performed by: PHYSICIAN ASSISTANT

## 2024-01-10 PROCEDURE — 74177 CT ABD & PELVIS W/CONTRAST: CPT | Mod: FR

## 2024-01-10 PROCEDURE — 71275 CT ANGIOGRAPHY CHEST: CPT | Mod: FR

## 2024-01-10 PROCEDURE — 83880 ASSAY OF NATRIURETIC PEPTIDE: CPT | Performed by: PHYSICIAN ASSISTANT

## 2024-01-10 RX ORDER — SULFAMETHOXAZOLE AND TRIMETHOPRIM 800; 160 MG/1; MG/1
1 TABLET ORAL EVERY 12 HOURS
Qty: 20 TABLET | Refills: 0 | Status: SHIPPED | OUTPATIENT
Start: 2024-01-10 | End: 2024-01-15 | Stop reason: HOSPADM

## 2024-01-10 RX ORDER — SULFAMETHOXAZOLE AND TRIMETHOPRIM 800; 160 MG/1; MG/1
1 TABLET ORAL ONCE
Status: COMPLETED | OUTPATIENT
Start: 2024-01-10 | End: 2024-01-10

## 2024-01-10 RX ADMIN — SULFAMETHOXAZOLE AND TRIMETHOPRIM 1 TABLET: 800; 160 TABLET ORAL at 08:31

## 2024-01-10 RX ADMIN — IOHEXOL 75 ML: 350 INJECTION, SOLUTION INTRAVENOUS at 07:23

## 2024-01-10 ASSESSMENT — LIFESTYLE VARIABLES
HAVE YOU EVER FELT YOU SHOULD CUT DOWN ON YOUR DRINKING: NO
REASON UNABLE TO ASSESS: NO
EVER FELT BAD OR GUILTY ABOUT YOUR DRINKING: NO
EVER HAD A DRINK FIRST THING IN THE MORNING TO STEADY YOUR NERVES TO GET RID OF A HANGOVER: NO
HAVE PEOPLE ANNOYED YOU BY CRITICIZING YOUR DRINKING: NO

## 2024-01-10 ASSESSMENT — PAIN DESCRIPTION - ORIENTATION: ORIENTATION: LEFT

## 2024-01-10 ASSESSMENT — PAIN DESCRIPTION - DESCRIPTORS: DESCRIPTORS: STABBING

## 2024-01-10 ASSESSMENT — COLUMBIA-SUICIDE SEVERITY RATING SCALE - C-SSRS
1. IN THE PAST MONTH, HAVE YOU WISHED YOU WERE DEAD OR WISHED YOU COULD GO TO SLEEP AND NOT WAKE UP?: NO
6. HAVE YOU EVER DONE ANYTHING, STARTED TO DO ANYTHING, OR PREPARED TO DO ANYTHING TO END YOUR LIFE?: NO
2. HAVE YOU ACTUALLY HAD ANY THOUGHTS OF KILLING YOURSELF?: NO

## 2024-01-10 ASSESSMENT — PAIN SCALES - GENERAL
PAINLEVEL_OUTOF10: 3
PAINLEVEL_OUTOF10: 4

## 2024-01-10 ASSESSMENT — PAIN DESCRIPTION - PROGRESSION: CLINICAL_PROGRESSION: NOT CHANGED

## 2024-01-10 ASSESSMENT — PAIN - FUNCTIONAL ASSESSMENT: PAIN_FUNCTIONAL_ASSESSMENT: 0-10

## 2024-01-10 ASSESSMENT — PAIN DESCRIPTION - LOCATION: LOCATION: BACK

## 2024-01-10 ASSESSMENT — PAIN DESCRIPTION - PAIN TYPE: TYPE: ACUTE PAIN

## 2024-01-10 NOTE — ED PROVIDER NOTES
HPI   Chief Complaint   Patient presents with    Flank Pain       A 63-year-old female patient comes in the emergency department today with complaints of left flank pain has been ongoing for the last 7 to 10 days.  Currently she rates pain 4 out of 10 on the pain scale but states pain can get up to an 8 or 9 out of 10 on the pain scale.  States that comes in waves.  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  Denies any history of kidney stones.  States the pain is worse sometimes when she breathes in.  States she is also had a cough for the last 10 days but seems to be improving over the last 2-3.  Denies any associated fevers, chills.  States she has intermittent nausea without vomiting.  For this purpose comes in the emergency department today for further evaluation.                          No data recorded                Patient History   Past Medical History:   Diagnosis Date    Coronary artery disease     Hyperlipidemia     Hypertension     Sleep apnea      Past Surgical History:   Procedure Laterality Date    OTHER SURGICAL HISTORY  01/13/2022    Surgery    OTHER SURGICAL HISTORY  01/13/2022    Cardiac catheterization with stent placement    OTHER SURGICAL HISTORY  01/13/2022    Colonoscopy    OTHER SURGICAL HISTORY  01/13/2022    Median nerve neuroplasty    OTHER SURGICAL HISTORY  01/13/2022    Percutaneous transluminal coronary angioplasty    OTHER SURGICAL HISTORY  01/13/2022    Nephrectomy    OTHER SURGICAL HISTORY  01/13/2022    Neck surgery    OTHER SURGICAL HISTORY  01/13/2022    Foot surgery    OTHER SURGICAL HISTORY  01/13/2022    Tubal ligation     Family History   Problem Relation Name Age of Onset    Hypertension Mother      Other (coronary bypass) Mother      Coronary artery disease Mother      Lymphoma Father      Stroke Sister      Hypertension Sister      Diabetes type II Sister      Other (pacemaker) Sister      Stroke Brother       Coronary artery disease Brother      Diabetes type II Brother      Hypertension Brother       Social History     Tobacco Use    Smoking status: Never    Smokeless tobacco: Never   Substance Use Topics    Alcohol use: Yes    Drug use: Never       Physical Exam   ED Triage Vitals [01/10/24 0627]   Temp Heart Rate Resp BP   36.1 °C (97 °F) 62 18 (!) 210/91      SpO2 Temp Source Heart Rate Source Patient Position   99 % Temporal Monitor Sitting      BP Location FiO2 (%)     Right arm --       Physical Exam  Constitutional:       General: She is in acute distress (Mild distress).      Appearance: Normal appearance. She is not ill-appearing, toxic-appearing or diaphoretic.   HENT:      Head: Normocephalic and atraumatic.      Nose: Nose normal.   Eyes:      Extraocular Movements: Extraocular movements intact.      Conjunctiva/sclera: Conjunctivae normal.   Cardiovascular:      Rate and Rhythm: Normal rate and regular rhythm.   Pulmonary:      Effort: Pulmonary effort is normal. No respiratory distress.      Breath sounds: Normal breath sounds. No stridor. No wheezing.   Abdominal:      Tenderness: There is left CVA tenderness. There is no right CVA tenderness.   Musculoskeletal:         General: Tenderness present. Normal range of motion.      Cervical back: Normal range of motion.   Skin:     General: Skin is warm and dry.   Neurological:      General: No focal deficit present.      Mental Status: She is alert and oriented to person, place, and time. Mental status is at baseline.   Psychiatric:         Mood and Affect: Mood normal.         ED Course & MDM   Diagnoses as of 01/10/24 0820   Pyelonephritis       Medical Decision Making  A 63-year-old female patient comes in the emergency department today with complaints of left flank pain has been ongoing for the last 7 to 10 days.  Currently she rates pain 4 out of 10 on the pain scale but states pain can get up to an 8 or 9 out of 10 on the pain scale.  States that comes  in waves.  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  Denies any history of kidney stones.  States the pain is worse sometimes when she breathes in.  States she is also had a cough for the last 10 days but seems to be improving over the last 2-3.  Denies any associated fevers, chills.  States she has intermittent nausea without vomiting.  For this purpose comes in the emergency department today for further evaluation.    CT PE study, CT abdomen pelvis, laboratory studies are ordered.  Urinalysis, COVID-19 influenza RSV ordered.  Rule out pulmonary emboli, pneumonia, pleural effusion, kidney stone, pyelonephritis, colitis.  Rule out electrolyte abnormalities, acute kidney injury, leukocytosis, left shift, UTI.    Patient's urinalysis is positive for infection with large leukocytes 1+ bacteria and 20 WBCs.  Patient negative for COVID-19 influenza RSV.  Negative BMP.  Lipase mildly bumped but no acute findings on CT study regards to the pancreas gallbladder or liver.  Patient's CMP does show a creatinine of 1.12 with a GFR of 55 which is new for the patient.  Lactate of 1 no elevation of white blood cell count or left shift.  Patient CT study shows no pulmonary emboli but some atelectasis and mucous plugging.  Normal abdominal CT study.  This is most likely pyelonephritis based off clinical examination and infection of the urine.  Will give patient first dose of p.o. antibiotic here in the emergency department as well as p.o. antibiotics for home.  Offered patient medication for pain, nausea patient declines.    Historian is the patient    Diagnosis: Pyelonephritis      Labs Reviewed   COMPREHENSIVE METABOLIC PANEL - Abnormal       Result Value    Glucose 118 (*)     Sodium 140      Potassium 3.8      Chloride 106      Bicarbonate 27      Anion Gap 11      Urea Nitrogen 22      Creatinine 1.12 (*)     eGFR 55 (*)     Calcium 8.9      Albumin 3.9      Alkaline  Phosphatase 68      Total Protein 6.9      AST 13      Bilirubin, Total 0.5      ALT 12     LIPASE - Abnormal    Lipase 90 (*)     Narrative:     Venipuncture immediately after or during the administration of Metamizole may lead to falsely low results. Testing should be performed immediately prior to Metamizole dosing.   URINALYSIS WITH REFLEX CULTURE AND MICROSCOPIC - Abnormal    Color, Urine Yellow      Appearance, Urine Hazy (*)     Specific Gravity, Urine 1.021      pH, Urine 6.0      Protein, Urine 100 (2+) (*)     Glucose, Urine NEGATIVE      Blood, Urine NEGATIVE      Ketones, Urine NEGATIVE      Bilirubin, Urine NEGATIVE      Urobilinogen, Urine <2.0      Nitrite, Urine NEGATIVE      Leukocyte Esterase, Urine LARGE (3+) (*)    MICROSCOPIC ONLY, URINE - Abnormal    WBC, Urine 11-20 (*)     RBC, Urine 1-2      Squamous Epithelial Cells, Urine 1-9 (SPARSE)      Bacteria, Urine 1+ (*)     Mucus, Urine 1+     LACTATE - Normal    Lactate 1.0      Narrative:     Venipuncture immediately after or during the administration of Metamizole may lead to falsely low results. Testing should be performed immediately  prior to Metamizole dosing.   B-TYPE NATRIURETIC PEPTIDE - Normal    BNP 63      Narrative:        <100 pg/mL - Heart failure unlikely  100-299 pg/mL - Intermediate probability of acute heart                  failure exacerbation. Correlate with clinical                  context and patient history.    >=300 pg/mL - Heart Failure likely. Correlate with clinical                  context and patient history.    BNP testing is performed using different testing methodology at [facility] than at other [facility]. Direct result comparisons should only be made within the same method.      SARS-COV-2 PCR, SYMPTOMATIC - Normal    Coronavirus 2019, PCR Not Detected      Narrative:     This assay has received FDA Emergency Use Authorization (EUA) and is only authorized for the duration of time that  circumstances exist to justify the authorization of the emergency use of in vitro diagnostic tests for the detection of SARS-CoV-2 virus and/or diagnosis of COVID-19 infection under section 564(b)(1) of the Act, 21 U.S.C. 360bbb-3(b)(1). This assay is an in vitro diagnostic nucleic acid amplification test for the qualitative detection of SARS-CoV-2 from nasopharyngeal specimens and has been validated for use at [facility]. Negative results do not preclude COVID-19 infections and should not be used as the sole basis for diagnosis, treatment, or other management decisions.     INFLUENZA A AND B PCR - Normal    Flu A Result Not Detected      Flu B Result Not Detected      Narrative:     This assay is an in vitro diagnostic multiplex nucleic acid amplification test for the detection and discrimination of Influenza A & B from nasopharyngeal specimens, and has been validated for use at [facility]. Negative results do not preclude Influenza A/B infections, and should not be used as the sole basis for diagnosis, treatment, or other management decisions. If Influenza A/B and RSV PCR results are negative, testing for Parainfluenza virus, Adenovirus and Metapneumovirus is routinely performed for [facility] [place] oncology and intensive care inpatients, and is available on other patients by placing an add-on request.   RSV PCR - Normal    RSV PCR Not Detected      Narrative:     This assay is an FDA-cleared, in vitro diagnostic nucleic acid amplification test for the detection of RSV from nasopharyngeal specimens, and has been validated for use at [facility]. Negative results do not preclude RSV infections, and should not be used as the sole basis for diagnosis, treatment, or other management decisions. If Influenza A/B and RSV PCR results are negative, testing for Parainfluenza virus, Adenovirus and Metapneumovirus is routinely performed for pediatric oncology  and intensive care inpatients at [facility] and is available on other patients by placing an add-on request.       URINE CULTURE   CBC WITH AUTO DIFFERENTIAL    WBC 6.9      nRBC 0.0      RBC 4.14      Hemoglobin 12.9      Hematocrit 37.8      MCV 91      MCH 31.2      MCHC 34.1      RDW 13.3      Platelets 245      Neutrophils % 52.6      Immature Granulocytes %, Automated 0.4      Lymphocytes % 37.5      Monocytes % 5.3      Eosinophils % 3.5      Basophils % 0.7      Neutrophils Absolute 3.64      Immature Granulocytes Absolute, Automated 0.03      Lymphocytes Absolute 2.60      Monocytes Absolute 0.37      Eosinophils Absolute 0.24      Basophils Absolute 0.05     URINALYSIS WITH REFLEX CULTURE AND MICROSCOPIC    Narrative:     The following orders were created for panel order Urinalysis with Reflex Culture and Microscopic.  Procedure                               Abnormality         Status                     ---------                               -----------         ------                     Urinalysis with Reflex C...[[id number]]  Abnormal            Final result               Extra Urine Gray Tube[[id number]]                            In process                   Please view results for these tests on the individual orders.   EXTRA URINE GRAY TUBE        CT angio chest for pulmonary embolism   Final Result   Subsegmental atelectasis LEFT lung base and lingula. This increased   compared to the 6/29/19 CT examination. There is associated mucous   plugging of the bronchials in these areas. Correlate clinically. No   findings of pulmonary embolus.   Horseshoe kidney. No acute renal abnormality identified.   Normal appendix.   Signed by Dmitry Randolph MD      CT abdomen pelvis w IV contrast   Final Result   Subsegmental atelectasis LEFT lung base and lingula. This increased   compared to the 6/29/19 CT examination. There is associated mucous   plugging of the bronchials in these areas. Correlate clinically. No    findings of pulmonary embolus.   Horseshoe kidney. No acute renal abnormality identified.   Normal appendix.   Signed by Dmitry Randolph MD          Procedure  Procedures     Benny Garcia PA-C  01/10/24 0851

## 2024-01-11 LAB — BACTERIA UR CULT: NORMAL

## 2024-01-13 ENCOUNTER — APPOINTMENT (OUTPATIENT)
Dept: RADIOLOGY | Facility: HOSPITAL | Age: 64
End: 2024-01-13
Payer: MEDICARE

## 2024-01-13 ENCOUNTER — APPOINTMENT (OUTPATIENT)
Dept: CARDIOLOGY | Facility: HOSPITAL | Age: 64
End: 2024-01-13
Payer: MEDICARE

## 2024-01-13 ENCOUNTER — HOSPITAL ENCOUNTER (OUTPATIENT)
Facility: HOSPITAL | Age: 64
Setting detail: OBSERVATION
Discharge: HOME | End: 2024-01-15
Attending: EMERGENCY MEDICINE | Admitting: STUDENT IN AN ORGANIZED HEALTH CARE EDUCATION/TRAINING PROGRAM
Payer: MEDICARE

## 2024-01-13 DIAGNOSIS — N30.00 ACUTE CYSTITIS WITHOUT HEMATURIA: ICD-10-CM

## 2024-01-13 DIAGNOSIS — N12 PYELONEPHRITIS: ICD-10-CM

## 2024-01-13 DIAGNOSIS — R10.9 LEFT FLANK PAIN: ICD-10-CM

## 2024-01-13 DIAGNOSIS — G89.4 CHRONIC PAIN SYNDROME: ICD-10-CM

## 2024-01-13 DIAGNOSIS — M45.0 ANKYLOSING SPONDYLITIS OF MULTIPLE SITES IN SPINE (MULTI): ICD-10-CM

## 2024-01-13 DIAGNOSIS — Z98.1 HISTORY OF FUSION OF CERVICAL SPINE: ICD-10-CM

## 2024-01-13 DIAGNOSIS — N39.0 URINARY TRACT INFECTION WITHOUT HEMATURIA, SITE UNSPECIFIED: Primary | ICD-10-CM

## 2024-01-13 DIAGNOSIS — G89.29 CHRONIC ANKLE PAIN, BILATERAL: ICD-10-CM

## 2024-01-13 DIAGNOSIS — M50.30 DEGENERATION OF CERVICAL INTERVERTEBRAL DISC: ICD-10-CM

## 2024-01-13 DIAGNOSIS — R10.9 INTRACTABLE ABDOMINAL PAIN: ICD-10-CM

## 2024-01-13 DIAGNOSIS — M25.571 CHRONIC ANKLE PAIN, BILATERAL: ICD-10-CM

## 2024-01-13 DIAGNOSIS — M25.572 CHRONIC ANKLE PAIN, BILATERAL: ICD-10-CM

## 2024-01-13 LAB
ALBUMIN SERPL BCP-MCNC: 4 G/DL (ref 3.4–5)
ALP SERPL-CCNC: 66 U/L (ref 33–136)
ALT SERPL W P-5'-P-CCNC: 10 U/L (ref 7–45)
ANION GAP SERPL CALC-SCNC: 13 MMOL/L (ref 10–20)
APPEARANCE UR: ABNORMAL
AST SERPL W P-5'-P-CCNC: 14 U/L (ref 9–39)
BACTERIA #/AREA URNS AUTO: ABNORMAL /HPF
BASOPHILS # BLD AUTO: 0.06 X10*3/UL (ref 0–0.1)
BASOPHILS NFR BLD AUTO: 1 %
BILIRUB SERPL-MCNC: 1 MG/DL (ref 0–1.2)
BILIRUB UR STRIP.AUTO-MCNC: NEGATIVE MG/DL
BUN SERPL-MCNC: 18 MG/DL (ref 6–23)
CALCIUM SERPL-MCNC: 9.2 MG/DL (ref 8.6–10.3)
CARDIAC TROPONIN I PNL SERPL HS: 4 NG/L (ref 0–13)
CARDIAC TROPONIN I PNL SERPL HS: 4 NG/L (ref 0–13)
CHLORIDE SERPL-SCNC: 103 MMOL/L (ref 98–107)
CO2 SERPL-SCNC: 25 MMOL/L (ref 21–32)
COLOR UR: YELLOW
CREAT SERPL-MCNC: 1.36 MG/DL (ref 0.5–1.05)
EGFRCR SERPLBLD CKD-EPI 2021: 43 ML/MIN/1.73M*2
EOSINOPHIL # BLD AUTO: 0.22 X10*3/UL (ref 0–0.7)
EOSINOPHIL NFR BLD AUTO: 3.7 %
ERYTHROCYTE [DISTWIDTH] IN BLOOD BY AUTOMATED COUNT: 13.5 % (ref 11.5–14.5)
GLUCOSE SERPL-MCNC: 107 MG/DL (ref 74–99)
GLUCOSE UR STRIP.AUTO-MCNC: NEGATIVE MG/DL
HCT VFR BLD AUTO: 37.1 % (ref 36–46)
HGB BLD-MCNC: 12.5 G/DL (ref 12–16)
HYALINE CASTS #/AREA URNS AUTO: ABNORMAL /LPF
IMM GRANULOCYTES # BLD AUTO: 0.02 X10*3/UL (ref 0–0.7)
IMM GRANULOCYTES NFR BLD AUTO: 0.3 % (ref 0–0.9)
KETONES UR STRIP.AUTO-MCNC: NEGATIVE MG/DL
LACTATE SERPL-SCNC: 1.1 MMOL/L (ref 0.4–2)
LEUKOCYTE ESTERASE UR QL STRIP.AUTO: ABNORMAL
LIPASE SERPL-CCNC: 31 U/L (ref 9–82)
LYMPHOCYTES # BLD AUTO: 1.66 X10*3/UL (ref 1.2–4.8)
LYMPHOCYTES NFR BLD AUTO: 27.6 %
MCH RBC QN AUTO: 30.5 PG (ref 26–34)
MCHC RBC AUTO-ENTMCNC: 33.7 G/DL (ref 32–36)
MCV RBC AUTO: 91 FL (ref 80–100)
MONOCYTES # BLD AUTO: 0.49 X10*3/UL (ref 0.1–1)
MONOCYTES NFR BLD AUTO: 8.1 %
MUCOUS THREADS #/AREA URNS AUTO: ABNORMAL /LPF
NEUTROPHILS # BLD AUTO: 3.57 X10*3/UL (ref 1.2–7.7)
NEUTROPHILS NFR BLD AUTO: 59.3 %
NITRITE UR QL STRIP.AUTO: NEGATIVE
NRBC BLD-RTO: 0 /100 WBCS (ref 0–0)
PH UR STRIP.AUTO: 6 [PH]
PLATELET # BLD AUTO: 214 X10*3/UL (ref 150–450)
POTASSIUM SERPL-SCNC: 4.4 MMOL/L (ref 3.5–5.3)
PROT SERPL-MCNC: 7 G/DL (ref 6.4–8.2)
PROT UR STRIP.AUTO-MCNC: ABNORMAL MG/DL
RBC # BLD AUTO: 4.1 X10*6/UL (ref 4–5.2)
RBC # UR STRIP.AUTO: NEGATIVE /UL
RBC #/AREA URNS AUTO: ABNORMAL /HPF
SODIUM SERPL-SCNC: 137 MMOL/L (ref 136–145)
SP GR UR STRIP.AUTO: 1.02
SQUAMOUS #/AREA URNS AUTO: ABNORMAL /HPF
UROBILINOGEN UR STRIP.AUTO-MCNC: <2 MG/DL
WBC # BLD AUTO: 6 X10*3/UL (ref 4.4–11.3)
WBC #/AREA URNS AUTO: ABNORMAL /HPF

## 2024-01-13 PROCEDURE — 2500000005 HC RX 250 GENERAL PHARMACY W/O HCPCS: Performed by: HOME HEALTH

## 2024-01-13 PROCEDURE — 2500000001 HC RX 250 WO HCPCS SELF ADMINISTERED DRUGS (ALT 637 FOR MEDICARE OP): Performed by: HOME HEALTH

## 2024-01-13 PROCEDURE — 2500000001 HC RX 250 WO HCPCS SELF ADMINISTERED DRUGS (ALT 637 FOR MEDICARE OP): Performed by: INTERNAL MEDICINE

## 2024-01-13 PROCEDURE — G0378 HOSPITAL OBSERVATION PER HR: HCPCS

## 2024-01-13 PROCEDURE — 99285 EMERGENCY DEPT VISIT HI MDM: CPT | Performed by: EMERGENCY MEDICINE

## 2024-01-13 PROCEDURE — 81001 URINALYSIS AUTO W/SCOPE: CPT | Performed by: HOME HEALTH

## 2024-01-13 PROCEDURE — 94760 N-INVAS EAR/PLS OXIMETRY 1: CPT

## 2024-01-13 PROCEDURE — 36415 COLL VENOUS BLD VENIPUNCTURE: CPT | Performed by: HOME HEALTH

## 2024-01-13 PROCEDURE — 84484 ASSAY OF TROPONIN QUANT: CPT | Performed by: HOME HEALTH

## 2024-01-13 PROCEDURE — 2500000004 HC RX 250 GENERAL PHARMACY W/ HCPCS (ALT 636 FOR OP/ED): Performed by: STUDENT IN AN ORGANIZED HEALTH CARE EDUCATION/TRAINING PROGRAM

## 2024-01-13 PROCEDURE — 71045 X-RAY EXAM CHEST 1 VIEW: CPT | Performed by: RADIOLOGY

## 2024-01-13 PROCEDURE — 83690 ASSAY OF LIPASE: CPT | Performed by: HOME HEALTH

## 2024-01-13 PROCEDURE — 83605 ASSAY OF LACTIC ACID: CPT | Performed by: HOME HEALTH

## 2024-01-13 PROCEDURE — 85025 COMPLETE CBC W/AUTO DIFF WBC: CPT | Performed by: HOME HEALTH

## 2024-01-13 PROCEDURE — 96365 THER/PROPH/DIAG IV INF INIT: CPT

## 2024-01-13 PROCEDURE — 99222 1ST HOSP IP/OBS MODERATE 55: CPT | Performed by: STUDENT IN AN ORGANIZED HEALTH CARE EDUCATION/TRAINING PROGRAM

## 2024-01-13 PROCEDURE — 80053 COMPREHEN METABOLIC PANEL: CPT | Performed by: HOME HEALTH

## 2024-01-13 PROCEDURE — 96361 HYDRATE IV INFUSION ADD-ON: CPT

## 2024-01-13 PROCEDURE — 74176 CT ABD & PELVIS W/O CONTRAST: CPT | Performed by: RADIOLOGY

## 2024-01-13 PROCEDURE — 71045 X-RAY EXAM CHEST 1 VIEW: CPT

## 2024-01-13 PROCEDURE — 2500000001 HC RX 250 WO HCPCS SELF ADMINISTERED DRUGS (ALT 637 FOR MEDICARE OP): Performed by: STUDENT IN AN ORGANIZED HEALTH CARE EDUCATION/TRAINING PROGRAM

## 2024-01-13 PROCEDURE — 74176 CT ABD & PELVIS W/O CONTRAST: CPT

## 2024-01-13 PROCEDURE — 93005 ELECTROCARDIOGRAM TRACING: CPT

## 2024-01-13 PROCEDURE — 96376 TX/PRO/DX INJ SAME DRUG ADON: CPT

## 2024-01-13 PROCEDURE — 2500000004 HC RX 250 GENERAL PHARMACY W/ HCPCS (ALT 636 FOR OP/ED): Performed by: HOME HEALTH

## 2024-01-13 PROCEDURE — 96375 TX/PRO/DX INJ NEW DRUG ADDON: CPT

## 2024-01-13 PROCEDURE — 87086 URINE CULTURE/COLONY COUNT: CPT | Performed by: STUDENT IN AN ORGANIZED HEALTH CARE EDUCATION/TRAINING PROGRAM

## 2024-01-13 RX ORDER — ACETAMINOPHEN 325 MG/1
650 TABLET ORAL EVERY 4 HOURS PRN
Status: DISCONTINUED | OUTPATIENT
Start: 2024-01-13 | End: 2024-01-15 | Stop reason: HOSPADM

## 2024-01-13 RX ORDER — NAPROXEN SODIUM 220 MG/1
81 TABLET, FILM COATED ORAL DAILY
Status: DISCONTINUED | OUTPATIENT
Start: 2024-01-14 | End: 2024-01-15 | Stop reason: HOSPADM

## 2024-01-13 RX ORDER — ENOXAPARIN SODIUM 100 MG/ML
40 INJECTION SUBCUTANEOUS EVERY 12 HOURS SCHEDULED
Status: DISCONTINUED | OUTPATIENT
Start: 2024-01-13 | End: 2024-01-15 | Stop reason: HOSPADM

## 2024-01-13 RX ORDER — POLYETHYLENE GLYCOL 3350 17 G/17G
17 POWDER, FOR SOLUTION ORAL DAILY PRN
Status: DISCONTINUED | OUTPATIENT
Start: 2024-01-13 | End: 2024-01-15 | Stop reason: HOSPADM

## 2024-01-13 RX ORDER — ALLOPURINOL 300 MG/1
300 TABLET ORAL DAILY
Status: DISCONTINUED | OUTPATIENT
Start: 2024-01-13 | End: 2024-01-15 | Stop reason: HOSPADM

## 2024-01-13 RX ORDER — PANTOPRAZOLE SODIUM 40 MG/1
40 TABLET, DELAYED RELEASE ORAL
Status: DISCONTINUED | OUTPATIENT
Start: 2024-01-14 | End: 2024-01-15 | Stop reason: HOSPADM

## 2024-01-13 RX ORDER — ACETAMINOPHEN 650 MG/1
650 SUPPOSITORY RECTAL EVERY 4 HOURS PRN
Status: DISCONTINUED | OUTPATIENT
Start: 2024-01-13 | End: 2024-01-15 | Stop reason: HOSPADM

## 2024-01-13 RX ORDER — ACETAMINOPHEN 160 MG/5ML
650 SOLUTION ORAL EVERY 4 HOURS PRN
Status: DISCONTINUED | OUTPATIENT
Start: 2024-01-13 | End: 2024-01-15 | Stop reason: HOSPADM

## 2024-01-13 RX ORDER — ATORVASTATIN CALCIUM 80 MG/1
80 TABLET, FILM COATED ORAL DAILY
Status: DISCONTINUED | OUTPATIENT
Start: 2024-01-13 | End: 2024-01-15 | Stop reason: HOSPADM

## 2024-01-13 RX ORDER — SODIUM CHLORIDE 9 MG/ML
100 INJECTION, SOLUTION INTRAVENOUS CONTINUOUS
Status: DISCONTINUED | OUTPATIENT
Start: 2024-01-13 | End: 2024-01-15 | Stop reason: HOSPADM

## 2024-01-13 RX ORDER — LEVOTHYROXINE SODIUM 125 UG/1
125 TABLET ORAL DAILY
Status: DISCONTINUED | OUTPATIENT
Start: 2024-01-14 | End: 2024-01-15 | Stop reason: HOSPADM

## 2024-01-13 RX ORDER — ONDANSETRON 4 MG/1
4 TABLET, FILM COATED ORAL EVERY 8 HOURS PRN
Status: DISCONTINUED | OUTPATIENT
Start: 2024-01-13 | End: 2024-01-15 | Stop reason: HOSPADM

## 2024-01-13 RX ORDER — ONDANSETRON HYDROCHLORIDE 2 MG/ML
4 INJECTION, SOLUTION INTRAVENOUS EVERY 8 HOURS PRN
Status: DISCONTINUED | OUTPATIENT
Start: 2024-01-13 | End: 2024-01-15 | Stop reason: HOSPADM

## 2024-01-13 RX ORDER — METOPROLOL TARTRATE 25 MG/1
25 TABLET, FILM COATED ORAL 2 TIMES DAILY
Status: DISCONTINUED | OUTPATIENT
Start: 2024-01-13 | End: 2024-01-15 | Stop reason: HOSPADM

## 2024-01-13 RX ORDER — MAGNESIUM HYDROXIDE 2400 MG/10ML
10 SUSPENSION ORAL DAILY PRN
Status: DISCONTINUED | OUTPATIENT
Start: 2024-01-13 | End: 2024-01-15 | Stop reason: HOSPADM

## 2024-01-13 RX ORDER — ISOSORBIDE MONONITRATE 30 MG/1
30 TABLET, EXTENDED RELEASE ORAL DAILY
Status: DISCONTINUED | OUTPATIENT
Start: 2024-01-13 | End: 2024-01-15 | Stop reason: HOSPADM

## 2024-01-13 RX ORDER — CIPROFLOXACIN 2 MG/ML
400 INJECTION, SOLUTION INTRAVENOUS ONCE
Status: DISCONTINUED | OUTPATIENT
Start: 2024-01-13 | End: 2024-01-13

## 2024-01-13 RX ORDER — TALC
3 POWDER (GRAM) TOPICAL DAILY
Status: DISCONTINUED | OUTPATIENT
Start: 2024-01-13 | End: 2024-01-15 | Stop reason: HOSPADM

## 2024-01-13 RX ORDER — PANTOPRAZOLE SODIUM 40 MG/10ML
40 INJECTION, POWDER, LYOPHILIZED, FOR SOLUTION INTRAVENOUS
Status: DISCONTINUED | OUTPATIENT
Start: 2024-01-14 | End: 2024-01-15 | Stop reason: HOSPADM

## 2024-01-13 RX ORDER — CEFTRIAXONE 1 G/50ML
1 INJECTION, SOLUTION INTRAVENOUS EVERY 24 HOURS
Status: DISCONTINUED | OUTPATIENT
Start: 2024-01-14 | End: 2024-01-15 | Stop reason: HOSPADM

## 2024-01-13 RX ORDER — LIDOCAINE 560 MG/1
1 PATCH PERCUTANEOUS; TOPICAL; TRANSDERMAL ONCE
Status: COMPLETED | OUTPATIENT
Start: 2024-01-13 | End: 2024-01-14

## 2024-01-13 RX ORDER — MORPHINE SULFATE 2 MG/ML
2 INJECTION, SOLUTION INTRAMUSCULAR; INTRAVENOUS EVERY 4 HOURS PRN
Status: DISCONTINUED | OUTPATIENT
Start: 2024-01-13 | End: 2024-01-15 | Stop reason: HOSPADM

## 2024-01-13 RX ORDER — CEFTRIAXONE 1 G/50ML
1 INJECTION, SOLUTION INTRAVENOUS ONCE
Status: COMPLETED | OUTPATIENT
Start: 2024-01-13 | End: 2024-01-13

## 2024-01-13 RX ORDER — LISINOPRIL 5 MG/1
5 TABLET ORAL DAILY
Status: DISCONTINUED | OUTPATIENT
Start: 2024-01-13 | End: 2024-01-15 | Stop reason: HOSPADM

## 2024-01-13 RX ORDER — CYCLOBENZAPRINE HCL 10 MG
5 TABLET ORAL ONCE
Status: COMPLETED | OUTPATIENT
Start: 2024-01-13 | End: 2024-01-13

## 2024-01-13 RX ORDER — DULOXETIN HYDROCHLORIDE 30 MG/1
60 CAPSULE, DELAYED RELEASE ORAL DAILY
Status: DISCONTINUED | OUTPATIENT
Start: 2024-01-13 | End: 2024-01-15 | Stop reason: HOSPADM

## 2024-01-13 RX ORDER — CYCLOBENZAPRINE HCL 10 MG
5 TABLET ORAL 3 TIMES DAILY
Status: DISCONTINUED | OUTPATIENT
Start: 2024-01-13 | End: 2024-01-15 | Stop reason: HOSPADM

## 2024-01-13 RX ORDER — NITROGLYCERIN 0.4 MG/1
0.4 TABLET SUBLINGUAL AS NEEDED
Status: DISCONTINUED | OUTPATIENT
Start: 2024-01-13 | End: 2024-01-15 | Stop reason: HOSPADM

## 2024-01-13 RX ORDER — ONDANSETRON HYDROCHLORIDE 2 MG/ML
4 INJECTION, SOLUTION INTRAVENOUS ONCE
Status: COMPLETED | OUTPATIENT
Start: 2024-01-13 | End: 2024-01-13

## 2024-01-13 RX ORDER — MORPHINE SULFATE 4 MG/ML
4 INJECTION, SOLUTION INTRAMUSCULAR; INTRAVENOUS ONCE
Status: COMPLETED | OUTPATIENT
Start: 2024-01-13 | End: 2024-01-13

## 2024-01-13 RX ADMIN — CEFTRIAXONE SODIUM 1 G: 1 INJECTION, SOLUTION INTRAVENOUS at 15:57

## 2024-01-13 RX ADMIN — SODIUM CHLORIDE 100 ML/HR: 9 INJECTION, SOLUTION INTRAVENOUS at 22:28

## 2024-01-13 RX ADMIN — ENOXAPARIN SODIUM 40 MG: 40 INJECTION SUBCUTANEOUS at 22:31

## 2024-01-13 RX ADMIN — ONDANSETRON 4 MG: 2 INJECTION INTRAMUSCULAR; INTRAVENOUS at 17:18

## 2024-01-13 RX ADMIN — METOPROLOL TARTRATE 25 MG: 25 TABLET, FILM COATED ORAL at 22:28

## 2024-01-13 RX ADMIN — MORPHINE SULFATE 4 MG: 4 INJECTION, SOLUTION INTRAMUSCULAR; INTRAVENOUS at 17:18

## 2024-01-13 RX ADMIN — CYCLOBENZAPRINE HYDROCHLORIDE 5 MG: 10 TABLET, FILM COATED ORAL at 22:28

## 2024-01-13 RX ADMIN — SODIUM CHLORIDE 500 ML: 9 INJECTION, SOLUTION INTRAVENOUS at 13:39

## 2024-01-13 RX ADMIN — LIDOCAINE 1 PATCH: 4 PATCH TOPICAL at 13:03

## 2024-01-13 RX ADMIN — CYCLOBENZAPRINE HYDROCHLORIDE 5 MG: 10 TABLET, FILM COATED ORAL at 13:03

## 2024-01-13 RX ADMIN — LISINOPRIL 5 MG: 5 TABLET ORAL at 22:29

## 2024-01-13 RX ADMIN — MORPHINE SULFATE 2 MG: 2 INJECTION, SOLUTION INTRAMUSCULAR; INTRAVENOUS at 22:48

## 2024-01-13 RX ADMIN — ATORVASTATIN CALCIUM 80 MG: 80 TABLET, FILM COATED ORAL at 22:29

## 2024-01-13 SDOH — SOCIAL STABILITY: SOCIAL INSECURITY: HAS ANYONE EVER THREATENED TO HURT YOUR FAMILY OR YOUR PETS?: NO

## 2024-01-13 SDOH — SOCIAL STABILITY: SOCIAL INSECURITY: ARE THERE ANY APPARENT SIGNS OF INJURIES/BEHAVIORS THAT COULD BE RELATED TO ABUSE/NEGLECT?: NO

## 2024-01-13 SDOH — SOCIAL STABILITY: SOCIAL INSECURITY: ABUSE: ADULT

## 2024-01-13 SDOH — SOCIAL STABILITY: SOCIAL INSECURITY: HAVE YOU HAD THOUGHTS OF HARMING ANYONE ELSE?: NO

## 2024-01-13 SDOH — SOCIAL STABILITY: SOCIAL INSECURITY: DO YOU FEEL ANYONE HAS EXPLOITED OR TAKEN ADVANTAGE OF YOU FINANCIALLY OR OF YOUR PERSONAL PROPERTY?: NO

## 2024-01-13 SDOH — SOCIAL STABILITY: SOCIAL INSECURITY: WERE YOU ABLE TO COMPLETE ALL THE BEHAVIORAL HEALTH SCREENINGS?: YES

## 2024-01-13 SDOH — SOCIAL STABILITY: SOCIAL INSECURITY: DOES ANYONE TRY TO KEEP YOU FROM HAVING/CONTACTING OTHER FRIENDS OR DOING THINGS OUTSIDE YOUR HOME?: NO

## 2024-01-13 SDOH — SOCIAL STABILITY: SOCIAL INSECURITY: ARE YOU OR HAVE YOU BEEN THREATENED OR ABUSED PHYSICALLY, EMOTIONALLY, OR SEXUALLY BY ANYONE?: NO

## 2024-01-13 SDOH — SOCIAL STABILITY: SOCIAL INSECURITY: DO YOU FEEL UNSAFE GOING BACK TO THE PLACE WHERE YOU ARE LIVING?: NO

## 2024-01-13 ASSESSMENT — PAIN - FUNCTIONAL ASSESSMENT
PAIN_FUNCTIONAL_ASSESSMENT: 0-10

## 2024-01-13 ASSESSMENT — PAIN DESCRIPTION - LOCATION
LOCATION: OTHER (COMMENT)
LOCATION: ABDOMEN
LOCATION: BACK

## 2024-01-13 ASSESSMENT — LIFESTYLE VARIABLES
AUDIT-C TOTAL SCORE: 2
HAVE YOU EVER FELT YOU SHOULD CUT DOWN ON YOUR DRINKING: NO
EVER HAD A DRINK FIRST THING IN THE MORNING TO STEADY YOUR NERVES TO GET RID OF A HANGOVER: NO
HOW OFTEN DO YOU HAVE A DRINK CONTAINING ALCOHOL: MONTHLY OR LESS
SKIP TO QUESTIONS 9-10: 0
HOW OFTEN DO YOU HAVE 6 OR MORE DRINKS ON ONE OCCASION: LESS THAN MONTHLY
AUDIT-C TOTAL SCORE: 2
EVER FELT BAD OR GUILTY ABOUT YOUR DRINKING: NO
HOW MANY STANDARD DRINKS CONTAINING ALCOHOL DO YOU HAVE ON A TYPICAL DAY: 1 OR 2
REASON UNABLE TO ASSESS: YES
HAVE PEOPLE ANNOYED YOU BY CRITICIZING YOUR DRINKING: NO

## 2024-01-13 ASSESSMENT — ACTIVITIES OF DAILY LIVING (ADL)
ADEQUATE_TO_COMPLETE_ADL: YES
HEARING - LEFT EAR: FUNCTIONAL
PATIENT'S MEMORY ADEQUATE TO SAFELY COMPLETE DAILY ACTIVITIES?: YES
GROOMING: INDEPENDENT
FEEDING YOURSELF: INDEPENDENT
ADEQUATE_TO_COMPLETE_ADL: YES
LACK_OF_TRANSPORTATION: NO
WALKS IN HOME: INDEPENDENT
JUDGMENT_ADEQUATE_SAFELY_COMPLETE_DAILY_ACTIVITIES: YES
HEARING - RIGHT EAR: FUNCTIONAL
PATIENT'S MEMORY ADEQUATE TO SAFELY COMPLETE DAILY ACTIVITIES?: YES
TOILETING: INDEPENDENT
HEARING - RIGHT EAR: FUNCTIONAL
BATHING: INDEPENDENT
HEARING - LEFT EAR: FUNCTIONAL
DRESSING YOURSELF: INDEPENDENT
BATHING: INDEPENDENT
FEEDING YOURSELF: INDEPENDENT
GROOMING: INDEPENDENT
DRESSING YOURSELF: INDEPENDENT
JUDGMENT_ADEQUATE_SAFELY_COMPLETE_DAILY_ACTIVITIES: YES
WALKS IN HOME: INDEPENDENT
TOILETING: INDEPENDENT

## 2024-01-13 ASSESSMENT — COGNITIVE AND FUNCTIONAL STATUS - GENERAL
DAILY ACTIVITIY SCORE: 24
MOBILITY SCORE: 24
PATIENT BASELINE BEDBOUND: NO

## 2024-01-13 ASSESSMENT — COLUMBIA-SUICIDE SEVERITY RATING SCALE - C-SSRS
6. HAVE YOU EVER DONE ANYTHING, STARTED TO DO ANYTHING, OR PREPARED TO DO ANYTHING TO END YOUR LIFE?: NO
2. HAVE YOU ACTUALLY HAD ANY THOUGHTS OF KILLING YOURSELF?: NO
1. IN THE PAST MONTH, HAVE YOU WISHED YOU WERE DEAD OR WISHED YOU COULD GO TO SLEEP AND NOT WAKE UP?: NO
2. HAVE YOU ACTUALLY HAD ANY THOUGHTS OF KILLING YOURSELF?: NO
1. IN THE PAST MONTH, HAVE YOU WISHED YOU WERE DEAD OR WISHED YOU COULD GO TO SLEEP AND NOT WAKE UP?: NO

## 2024-01-13 ASSESSMENT — ENCOUNTER SYMPTOMS
CARDIOVASCULAR NEGATIVE: 1
NEUROLOGICAL NEGATIVE: 1
GASTROINTESTINAL NEGATIVE: 1
CONSTITUTIONAL NEGATIVE: 1
RESPIRATORY NEGATIVE: 1
PSYCHIATRIC NEGATIVE: 1
FLANK PAIN: 1
EYES NEGATIVE: 1

## 2024-01-13 ASSESSMENT — PATIENT HEALTH QUESTIONNAIRE - PHQ9
SUM OF ALL RESPONSES TO PHQ9 QUESTIONS 1 & 2: 0
2. FEELING DOWN, DEPRESSED OR HOPELESS: NOT AT ALL
1. LITTLE INTEREST OR PLEASURE IN DOING THINGS: NOT AT ALL

## 2024-01-13 ASSESSMENT — PAIN SCALES - GENERAL
PAINLEVEL_OUTOF10: 7
PAINLEVEL_OUTOF10: 0 - NO PAIN
PAINLEVEL_OUTOF10: 6

## 2024-01-13 ASSESSMENT — PAIN DESCRIPTION - ORIENTATION
ORIENTATION: LEFT
ORIENTATION: LEFT;LOWER

## 2024-01-13 ASSESSMENT — PAIN DESCRIPTION - PAIN TYPE: TYPE: ACUTE PAIN

## 2024-01-13 NOTE — ED PROVIDER NOTES
"HPI   Chief Complaint   Patient presents with    Flank Pain     Pt states was seen here on Wednesday, diagnosed with kidney infection on L side, given abx. Pt states the pain is worse and \"something is not right.\" Denies urinary symptoms        Patient is a 63-year-old female presenting to the ED with left flank pain.  Past medical history significant for coronary artery disease requiring 1 stent placement, hyperlipidemia, GERD and hypothyroidism.  Patient was seen Emergency Department approximately 3 days ago for very similar symptoms and treated with a kidney infection.  She was discharged home on Bactrim and states that she been taking Bactrim twice a day since Wednesday with no improvement of symptoms.  States that symptoms have progressively worsened and states that she feels contractions along her left side and left flank.  Taking meloxicam and Tylenol with no improvement of symptoms.  Denies any injury or trauma but pain is exacerbated with positional movements.  No urinary symptoms such as dysuria, hematuria increasing frequency.  No previous history of kidney stones and unsure if she has a history of kidney infections.  Denies nausea, vomiting or diarrhea.  No chest pain or shortness of breath.  No fever or chills.                          Rayo Coma Scale Score: 15                  Patient History   Past Medical History:   Diagnosis Date    Coronary artery disease     Hyperlipidemia     Hypertension     Sleep apnea      Past Surgical History:   Procedure Laterality Date    OTHER SURGICAL HISTORY  01/13/2022    Surgery    OTHER SURGICAL HISTORY  01/13/2022    Cardiac catheterization with stent placement    OTHER SURGICAL HISTORY  01/13/2022    Colonoscopy    OTHER SURGICAL HISTORY  01/13/2022    Median nerve neuroplasty    OTHER SURGICAL HISTORY  01/13/2022    Percutaneous transluminal coronary angioplasty    OTHER SURGICAL HISTORY  01/13/2022    Nephrectomy    OTHER SURGICAL HISTORY  01/13/2022    Neck " surgery    OTHER SURGICAL HISTORY  01/13/2022    Foot surgery    OTHER SURGICAL HISTORY  01/13/2022    Tubal ligation     Family History   Problem Relation Name Age of Onset    Hypertension Mother      Other (coronary bypass) Mother      Coronary artery disease Mother      Lymphoma Father      Stroke Sister      Hypertension Sister      Diabetes type II Sister      Other (pacemaker) Sister      Stroke Brother      Coronary artery disease Brother      Diabetes type II Brother      Hypertension Brother       Social History     Tobacco Use    Smoking status: Never    Smokeless tobacco: Never   Substance Use Topics    Alcohol use: Yes    Drug use: Never       Physical Exam   ED Triage Vitals [01/13/24 1110]   Temp Heart Rate Resp BP   36.5 °C (97.7 °F) 64 15 142/67      SpO2 Temp Source Heart Rate Source Patient Position   94 % Skin Monitor --      BP Location FiO2 (%)     -- --       Physical Exam  Vitals reviewed.   Constitutional:       Appearance: Normal appearance.   HENT:      Head: Normocephalic.      Nose: Nose normal.      Mouth/Throat:      Mouth: Mucous membranes are moist.      Pharynx: Oropharynx is clear.   Eyes:      Extraocular Movements: Extraocular movements intact.      Pupils: Pupils are equal, round, and reactive to light.   Cardiovascular:      Rate and Rhythm: Normal rate and regular rhythm.      Pulses: Normal pulses.      Heart sounds: Normal heart sounds.   Pulmonary:      Effort: Pulmonary effort is normal.      Breath sounds: Normal breath sounds. No wheezing, rhonchi or rales.   Abdominal:      General: Abdomen is flat. Bowel sounds are normal.      Palpations: Abdomen is soft.      Tenderness: There is no abdominal tenderness. There is left CVA tenderness. There is no right CVA tenderness.   Musculoskeletal:         General: Tenderness present. Normal range of motion.      Cervical back: Normal range of motion.      Comments:   No midline C, T L-spine tenderness.  Reproducible paraspinal  tenderness and left subscapular tenderness.  Pain with rotation of the torso to the left and relief of symptoms with rotation to the right.   Skin:     General: Skin is warm.   Neurological:      General: No focal deficit present.      Mental Status: She is alert. Mental status is at baseline.   Psychiatric:         Mood and Affect: Mood normal.         Behavior: Behavior normal.       Labs Reviewed   COMPREHENSIVE METABOLIC PANEL - Abnormal       Result Value    Glucose 107 (*)     Sodium 137      Potassium 4.4      Chloride 103      Bicarbonate 25      Anion Gap 13      Urea Nitrogen 18      Creatinine 1.36 (*)     eGFR 43 (*)     Calcium 9.2      Albumin 4.0      Alkaline Phosphatase 66      Total Protein 7.0      AST 14      Bilirubin, Total 1.0      ALT 10     URINALYSIS WITH REFLEX MICROSCOPIC - Abnormal    Color, Urine Yellow      Appearance, Urine Hazy (*)     Specific Gravity, Urine 1.017      pH, Urine 6.0      Protein, Urine 100 (2+) (*)     Glucose, Urine NEGATIVE      Blood, Urine NEGATIVE      Ketones, Urine NEGATIVE      Bilirubin, Urine NEGATIVE      Urobilinogen, Urine <2.0      Nitrite, Urine NEGATIVE      Leukocyte Esterase, Urine LARGE (3+) (*)    MICROSCOPIC ONLY, URINE - Abnormal    WBC, Urine 6-10 (*)     RBC, Urine 1-2      Squamous Epithelial Cells, Urine 1-9 (SPARSE)      Bacteria, Urine 1+ (*)     Mucus, Urine 1+      Hyaline Casts, Urine 1+ (*)    LIPASE - Normal    Lipase 31      Narrative:     Venipuncture immediately after or during the administration of Metamizole may lead to falsely low results. Testing should be performed immediately prior to Metamizole dosing.   LACTATE - Normal    Lactate 1.1      Narrative:     Venipuncture immediately after or during the administration of Metamizole may lead to falsely low results. Testing should be performed immediately  prior to Metamizole dosing.   SERIAL TROPONIN-INITIAL - Normal    Troponin I, High Sensitivity 4      Narrative:     Less  than 99th percentile of normal range cutoff-  Female and children under 18 years old <14 ng/L; Male <21 ng/L: Negative  Repeat testing should be performed if clinically indicated.     Female and children under 18 years old 14-50 ng/L; Male 21-50 ng/L:  Consistent with possible cardiac damage and possible increased clinical   risk. Serial measurements may help to assess extent of myocardial damage.     >50 ng/L: Consistent with cardiac damage, increased clinical risk and  myocardial infarction. Serial measurements may help assess extent of   myocardial damage.      NOTE: Children less than 1 year old may have higher baseline troponin   levels and results should be interpreted in conjunction with the overall   clinical context.     NOTE: Troponin I testing is performed using a different   testing methodology at [facility] than at other   [facility]. Direct result comparisons should only   be made within the same method.   SERIAL TROPONIN, 1 HOUR - Normal    Troponin I, High Sensitivity 4      Narrative:     Less than 99th percentile of normal range cutoff-  Female and children under 18 years old <14 ng/L; Male <21 ng/L: Negative  Repeat testing should be performed if clinically indicated.     Female and children under 18 years old 14-50 ng/L; Male 21-50 ng/L:  Consistent with possible cardiac damage and possible increased clinical   risk. Serial measurements may help to assess extent of myocardial damage.     >50 ng/L: Consistent with cardiac damage, increased clinical risk and  myocardial infarction. Serial measurements may help assess extent of   myocardial damage.      NOTE: Children less than 1 year old may have higher baseline troponin   levels and results should be interpreted in conjunction with the overall   clinical context.     NOTE: Troponin I testing is performed using a different   testing methodology at [facility] than at other   [facility]. Direct result  comparisons should only   be made within the same method.   URINE CULTURE   CBC WITH AUTO DIFFERENTIAL    WBC 6.0      nRBC 0.0      RBC 4.10      Hemoglobin 12.5      Hematocrit 37.1      MCV 91      MCH 30.5      MCHC 33.7      RDW 13.5      Platelets 214      Neutrophils % 59.3      Immature Granulocytes %, Automated 0.3      Lymphocytes % 27.6      Monocytes % 8.1      Eosinophils % 3.7      Basophils % 1.0      Neutrophils Absolute 3.57      Immature Granulocytes Absolute, Automated 0.02      Lymphocytes Absolute 1.66      Monocytes Absolute 0.49      Eosinophils Absolute 0.22      Basophils Absolute 0.06     TROPONIN SERIES- (INITIAL, 1 HR)    Narrative:     The following orders were created for panel order Troponin Series, (0, 1 HR).  Procedure                               Abnormality         Status                     ---------                               -----------         ------                     Troponin I, High Sensiti...[[id number]]  Normal              Final result               Troponin, High Sensitivi...[[id number]]  Normal              Final result                 Please view results for these tests on the individual orders.   CBC   BASIC METABOLIC PANEL     CT abdomen pelvis wo IV contrast   Final Result   1.  No acute finding in the abdomen and pelvis on this unenhanced   study.   2. Moderate amount of retained stool in the colon with no evidence of   bowel obstruction.   3. No radiopaque stone in the patient's horseshoe kidney, bilateral   ureters and the urinary bladder.   4. No hydroureteronephrosis        MACRO:   None        Signed by: Robbie Payne 1/13/2024 4:33 PM   Dictation workstation:   [id number]      XR chest 1 view   Final Result   1.  No active cardiopulmonary process.             Signed by: Robbie Payne 1/13/2024 12:19 PM   Dictation workstation:   [id number]          ED Course & MDM   Diagnoses as of 01/13/24 1947   Left flank pain   Pyelonephritis   Intractable abdominal  pain       Medical Decision Making  Independent Historians: Patient    MDM:   63-year-old female presenting to the ED with left flank pain.  Patient was seen in the emergency room approximately 3 days ago and diagnosed with pyelonephritis.  Currently taking Bactrim at home and on day 3 with no improvement of symptoms.  States that symptoms are actually worsening.  Denies increasing urinary frequency, dysuria or hematuria.  No nausea, vomiting or diarrhea.  Denies chest pain or shortness of breath however she does have a previous history of stent placement x 1.  On Wednesday patient had a CT scan of the chest as well as the abdomen ordered which showed no signs of PE and no signs of acute pyelonephritis seen on imaging but urinalysis did suggest that there were signs for urinary tract infection.  On exam today patient has no signs of tachycardia, afebrile and hypertensive.  Patient has left CVA tenderness and left leg tenderness to palpation.  Tenderness is exacerbated with rotation of the torso to the left and relieved with rotation of torso to the right.  Bowel sounds auscultated x 4.  Concerns for pyelonephritis versus musculoskeletal injury.  Patient was given Flexeril p.o. and Lidoderm patch after initial assessment as well as IV fluids.  Labs and imaging ordered to further evaluate patient's symptoms.    My interpretation of EKG performed at 1328 suggest sinus bradycardia with right axis deviation.  Nonspecific ST abnormality.  Ventricular rate of 57 no ST elevations.      My interpretation of labs suggest CBC with no leukocytosis or anemia.  CMP suggest no electrolyte abnormalities.  She does have a worsening AMANDA with a GFR 43 and a creatinine 1.36 appears to be downtrending compared to previous laboratory studies.   liver enzymes unremarkable.  Lactate within normal limits.  Lipase within normal limits reduce patient pancreatitis.  Troponin x 2 both within normal limits reduce patient of ACS.  Urinalysis  suggest 3+ leukocytes 6-10 white blood blood cells and 1+ bacteria concerning for UTI however appears to be improving compared to previous urinalysis.  Chest x-ray interpreted radiologist suggest no acute cardiopulmonary process.   CT of the abdomen suggest no acute findings in the abdomen or pelvis.  Moderate amount of retained stool in the colon with no evidence of obstruction.  No radiopaque stone.  No hydroureteronephrosis.  Discussed these fines with the patient which he understands.    Patient reassessed and still endorsing pain.  Patient was given morphine IV and Zofran IV for pain control which helped resolve her symptoms.  Due to failed outpatient treatment concern for worsening pyelonephritis discussed with patient that she would benefit from hospital admission which she agrees to this plan.  Patient was started on IV Rocephin in the ED to treat for pyelonephritis.   I paged the hospitalist regarding patient's symptoms.  Patient was accepted for admission with a diagnosis of left leg pain, pyelonephritis and intractable abdominal pain.    DDx/Differential: pyelonephritis, nephrolithiasis, diverticulitis, muscle strain, ACS    Diagnosis: left flank pain, pyelonephritis, intractable abdominal pain        Dictation Disclaimer: Due to voice recognition software, sound alike and misspelled words may be contained in documentation. If you have any questions please contact me.            Procedure  Procedures     Kyle Vogt PA-C  01/13/24 1951

## 2024-01-13 NOTE — H&P
History Of Present Illness  Kiara Ordaz is a 63 y.o. female with past medical history of hypertension, hyperlipidemia, hypothyroidism, morbid obesity, obstructive sleep apnea on CPAP, presented to the ED with complaint of left flank pain.  Patient was seen in the EDOn 1/10/2024 for similar complaint was complaining of left flank pain going on for days, during that time UA was sent was positive for UTI, CT abdomen pelvis with contrast was done and showed left lung base atelectasis and mucous plugging, no pulmonary emboli, horseshoe kidney, no acute renal abnormality and normal appendix, patient was sent home with Bactrim for UTI.  Patient returns back today with left flank pain which she reports has not resolved since her last discharge.  Today patient had mild increase in her creatinine from her baseline today it is around 1.3 likely from contrast that she has been getting since her last time presentation and the ED, UA still positive, urine culture from 1/10 did not show any growth, CT abdomen pelvis was repeated today and showed no acute finding in abdomen and pelvis moderate amount of stool in the colon no stone present, horseshoe kidney no hydronephrosis.  Patient admitted for pain as well as treatment of her UTI.  Past Medical History  Past Medical History:   Diagnosis Date    Coronary artery disease     Hyperlipidemia     Hypertension     Sleep apnea        Surgical History  Past Surgical History:   Procedure Laterality Date    OTHER SURGICAL HISTORY  01/13/2022    Surgery    OTHER SURGICAL HISTORY  01/13/2022    Cardiac catheterization with stent placement    OTHER SURGICAL HISTORY  01/13/2022    Colonoscopy    OTHER SURGICAL HISTORY  01/13/2022    Median nerve neuroplasty    OTHER SURGICAL HISTORY  01/13/2022    Percutaneous transluminal coronary angioplasty    OTHER SURGICAL HISTORY  01/13/2022    Nephrectomy    OTHER SURGICAL HISTORY  01/13/2022    Neck surgery    OTHER SURGICAL HISTORY  01/13/2022     Foot surgery    OTHER SURGICAL HISTORY  01/13/2022    Tubal ligation        Social History  She reports that she has never smoked. She has never used smokeless tobacco. She reports current alcohol use. She reports that she does not use drugs.    Family History  Family History   Problem Relation Name Age of Onset    Hypertension Mother      Other (coronary bypass) Mother      Coronary artery disease Mother      Lymphoma Father      Stroke Sister      Hypertension Sister      Diabetes type II Sister      Other (pacemaker) Sister      Stroke Brother      Coronary artery disease Brother      Diabetes type II Brother      Hypertension Brother          Allergies  Hydrocodone, Iodine, and Penicillins    Review of Systems   Constitutional: Negative.    HENT: Negative.     Eyes: Negative.    Respiratory: Negative.     Cardiovascular: Negative.    Gastrointestinal: Negative.    Genitourinary:  Positive for flank pain.   Skin: Negative.    Neurological: Negative.    Psychiatric/Behavioral: Negative.          Physical Exam  Constitutional:       General: She is not in acute distress.     Appearance: Normal appearance. She is not ill-appearing, toxic-appearing or diaphoretic.   HENT:      Head: Normocephalic and atraumatic.      Mouth/Throat:      Mouth: Mucous membranes are moist.      Pharynx: No oropharyngeal exudate.   Eyes:      Extraocular Movements: Extraocular movements intact.      Pupils: Pupils are equal, round, and reactive to light.   Cardiovascular:      Rate and Rhythm: Normal rate and regular rhythm.      Heart sounds: No murmur heard.  Pulmonary:      Effort: Pulmonary effort is normal. No respiratory distress.      Breath sounds: Normal breath sounds. No wheezing.   Abdominal:      General: Abdomen is flat. Bowel sounds are normal. There is no distension.      Tenderness: There is no abdominal tenderness. There is left CVA tenderness. There is no guarding or rebound.   Musculoskeletal:         General: No  "swelling.      Right lower leg: No edema.      Left lower leg: No edema.   Skin:     Findings: No lesion or rash.   Neurological:      General: No focal deficit present.      Mental Status: She is alert and oriented to person, place, and time.      Cranial Nerves: No cranial nerve deficit.      Motor: No weakness.   Psychiatric:         Mood and Affect: Mood normal.         Behavior: Behavior normal.          Last Recorded Vitals  Blood pressure (!) 188/88, pulse 66, temperature 36.6 °C (97.9 °F), temperature source Temporal, resp. rate 18, height 1.626 m (5' 4\"), weight 109 kg (240 lb), SpO2 94 %.    Relevant Results        Results for orders placed or performed during the hospital encounter of 01/13/24 (from the past 24 hour(s))   CBC and Auto Differential   Result Value Ref Range    WBC 6.0 4.4 - 11.3 x10*3/uL    nRBC 0.0 0.0 - 0.0 /100 WBCs    RBC 4.10 4.00 - 5.20 x10*6/uL    Hemoglobin 12.5 12.0 - 16.0 g/dL    Hematocrit 37.1 36.0 - 46.0 %    MCV 91 80 - 100 fL    MCH 30.5 26.0 - 34.0 pg    MCHC 33.7 32.0 - 36.0 g/dL    RDW 13.5 11.5 - 14.5 %    Platelets 214 150 - 450 x10*3/uL    Neutrophils % 59.3 40.0 - 80.0 %    Immature Granulocytes %, Automated 0.3 0.0 - 0.9 %    Lymphocytes % 27.6 13.0 - 44.0 %    Monocytes % 8.1 2.0 - 10.0 %    Eosinophils % 3.7 0.0 - 6.0 %    Basophils % 1.0 0.0 - 2.0 %    Neutrophils Absolute 3.57 1.20 - 7.70 x10*3/uL    Immature Granulocytes Absolute, Automated 0.02 0.00 - 0.70 x10*3/uL    Lymphocytes Absolute 1.66 1.20 - 4.80 x10*3/uL    Monocytes Absolute 0.49 0.10 - 1.00 x10*3/uL    Eosinophils Absolute 0.22 0.00 - 0.70 x10*3/uL    Basophils Absolute 0.06 0.00 - 0.10 x10*3/uL   Comprehensive metabolic panel   Result Value Ref Range    Glucose 107 (H) 74 - 99 mg/dL    Sodium 137 136 - 145 mmol/L    Potassium 4.4 3.5 - 5.3 mmol/L    Chloride 103 98 - 107 mmol/L    Bicarbonate 25 21 - 32 mmol/L    Anion Gap 13 10 - 20 mmol/L    Urea Nitrogen 18 6 - 23 mg/dL    Creatinine 1.36 (H) " 0.50 - 1.05 mg/dL    eGFR 43 (L) >60 mL/min/1.73m*2    Calcium 9.2 8.6 - 10.3 mg/dL    Albumin 4.0 3.4 - 5.0 g/dL    Alkaline Phosphatase 66 33 - 136 U/L    Total Protein 7.0 6.4 - 8.2 g/dL    AST 14 9 - 39 U/L    Bilirubin, Total 1.0 0.0 - 1.2 mg/dL    ALT 10 7 - 45 U/L   Lipase   Result Value Ref Range    Lipase 31 9 - 82 U/L   Lactate   Result Value Ref Range    Lactate 1.1 0.4 - 2.0 mmol/L   Troponin I, High Sensitivity, Initial   Result Value Ref Range    Troponin I, High Sensitivity 4 0 - 13 ng/L   Urinalysis with Reflex Microscopic   Result Value Ref Range    Color, Urine Yellow Straw, Yellow    Appearance, Urine Hazy (N) Clear    Specific Gravity, Urine 1.017 1.005 - 1.035    pH, Urine 6.0 5.0, 5.5, 6.0, 6.5, 7.0, 7.5, 8.0    Protein, Urine 100 (2+) (N) NEGATIVE mg/dL    Glucose, Urine NEGATIVE NEGATIVE mg/dL    Blood, Urine NEGATIVE NEGATIVE    Ketones, Urine NEGATIVE NEGATIVE mg/dL    Bilirubin, Urine NEGATIVE NEGATIVE    Urobilinogen, Urine <2.0 <2.0 mg/dL    Nitrite, Urine NEGATIVE NEGATIVE    Leukocyte Esterase, Urine LARGE (3+) (A) NEGATIVE   Microscopic Only, Urine   Result Value Ref Range    WBC, Urine 6-10 (A) 1-5, NONE /HPF    RBC, Urine 1-2 NONE, 1-2, 3-5 /HPF    Squamous Epithelial Cells, Urine 1-9 (SPARSE) Reference range not established. /HPF    Bacteria, Urine 1+ (A) NONE SEEN /HPF    Mucus, Urine 1+ Reference range not established. /LPF    Hyaline Casts, Urine 1+ (A) NONE /LPF   ECG 12 lead   Result Value Ref Range    Ventricular Rate 57 BPM    Atrial Rate 57 BPM    NC Interval 138 ms    QRS Duration 96 ms    QT Interval 456 ms    QTC Calculation(Bazett) 443 ms    R Axis 189 degrees    T Axis 145 degrees    QRS Count 10 beats    Q Onset 213 ms    P Onset 144 ms    P Offset 199 ms    T Offset 441 ms    QTC Fredericia 448 ms   Troponin, High Sensitivity, 1 Hour   Result Value Ref Range    Troponin I, High Sensitivity 4 0 - 13 ng/L          Assessment/Plan   Active Problems:  There are no active  Hospital Problems.      #UTI  #Left flank pain likely musculoskeletal etiology  #Horseshoe kidney    Admit to medical floor  Send urine culture  UA positive  Continue Rocephin  Pain management  Morphine as needed for pain, lidocaine patch and Flexeril    #History of hypertension and hyperlipidemia  Resume home medications    #Obstructive sleep apnea  CPAP at night    #AMANDA  Continue IV hydration  Recheck creatinine in a.m.      #DVT prophylaxis Lovenox  Disposition possible discharge over the next 24 hours depending on resolution of pain             Lula Frausto MD

## 2024-01-14 ENCOUNTER — APPOINTMENT (OUTPATIENT)
Dept: RADIOLOGY | Facility: HOSPITAL | Age: 64
End: 2024-01-14
Payer: MEDICARE

## 2024-01-14 LAB
ANION GAP SERPL CALC-SCNC: 11 MMOL/L (ref 10–20)
ATRIAL RATE: 57 BPM
BUN SERPL-MCNC: 17 MG/DL (ref 6–23)
CALCIUM SERPL-MCNC: 8.7 MG/DL (ref 8.6–10.3)
CHLORIDE SERPL-SCNC: 104 MMOL/L (ref 98–107)
CO2 SERPL-SCNC: 25 MMOL/L (ref 21–32)
CREAT SERPL-MCNC: 1.28 MG/DL (ref 0.5–1.05)
EGFRCR SERPLBLD CKD-EPI 2021: 47 ML/MIN/1.73M*2
ERYTHROCYTE [DISTWIDTH] IN BLOOD BY AUTOMATED COUNT: 13.6 % (ref 11.5–14.5)
GLUCOSE SERPL-MCNC: 107 MG/DL (ref 74–99)
HCT VFR BLD AUTO: 35.4 % (ref 36–46)
HGB BLD-MCNC: 11.6 G/DL (ref 12–16)
HOLD SPECIMEN: NORMAL
MCH RBC QN AUTO: 30.1 PG (ref 26–34)
MCHC RBC AUTO-ENTMCNC: 32.8 G/DL (ref 32–36)
MCV RBC AUTO: 92 FL (ref 80–100)
NRBC BLD-RTO: 0 /100 WBCS (ref 0–0)
P OFFSET: 199 MS
P ONSET: 144 MS
PLATELET # BLD AUTO: 200 X10*3/UL (ref 150–450)
POTASSIUM SERPL-SCNC: 4 MMOL/L (ref 3.5–5.3)
PR INTERVAL: 138 MS
Q ONSET: 213 MS
QRS COUNT: 10 BEATS
QRS DURATION: 96 MS
QT INTERVAL: 456 MS
QTC CALCULATION(BAZETT): 443 MS
QTC FREDERICIA: 448 MS
R AXIS: 189 DEGREES
RBC # BLD AUTO: 3.85 X10*6/UL (ref 4–5.2)
SODIUM SERPL-SCNC: 136 MMOL/L (ref 136–145)
T AXIS: 145 DEGREES
T OFFSET: 441 MS
VENTRICULAR RATE: 57 BPM
WBC # BLD AUTO: 6.2 X10*3/UL (ref 4.4–11.3)

## 2024-01-14 PROCEDURE — 76770 US EXAM ABDO BACK WALL COMP: CPT | Performed by: RADIOLOGY

## 2024-01-14 PROCEDURE — G0378 HOSPITAL OBSERVATION PER HR: HCPCS

## 2024-01-14 PROCEDURE — 76770 US EXAM ABDO BACK WALL COMP: CPT

## 2024-01-14 PROCEDURE — 74019 RADEX ABDOMEN 2 VIEWS: CPT

## 2024-01-14 PROCEDURE — 36415 COLL VENOUS BLD VENIPUNCTURE: CPT | Performed by: STUDENT IN AN ORGANIZED HEALTH CARE EDUCATION/TRAINING PROGRAM

## 2024-01-14 PROCEDURE — 99231 SBSQ HOSP IP/OBS SF/LOW 25: CPT | Performed by: STUDENT IN AN ORGANIZED HEALTH CARE EDUCATION/TRAINING PROGRAM

## 2024-01-14 PROCEDURE — 80048 BASIC METABOLIC PNL TOTAL CA: CPT | Performed by: STUDENT IN AN ORGANIZED HEALTH CARE EDUCATION/TRAINING PROGRAM

## 2024-01-14 PROCEDURE — 85027 COMPLETE CBC AUTOMATED: CPT | Performed by: STUDENT IN AN ORGANIZED HEALTH CARE EDUCATION/TRAINING PROGRAM

## 2024-01-14 PROCEDURE — 2500000001 HC RX 250 WO HCPCS SELF ADMINISTERED DRUGS (ALT 637 FOR MEDICARE OP): Performed by: INTERNAL MEDICINE

## 2024-01-14 PROCEDURE — 2500000004 HC RX 250 GENERAL PHARMACY W/ HCPCS (ALT 636 FOR OP/ED): Performed by: STUDENT IN AN ORGANIZED HEALTH CARE EDUCATION/TRAINING PROGRAM

## 2024-01-14 PROCEDURE — 2500000001 HC RX 250 WO HCPCS SELF ADMINISTERED DRUGS (ALT 637 FOR MEDICARE OP): Performed by: STUDENT IN AN ORGANIZED HEALTH CARE EDUCATION/TRAINING PROGRAM

## 2024-01-14 PROCEDURE — 96366 THER/PROPH/DIAG IV INF ADDON: CPT

## 2024-01-14 PROCEDURE — 2500000005 HC RX 250 GENERAL PHARMACY W/O HCPCS: Performed by: STUDENT IN AN ORGANIZED HEALTH CARE EDUCATION/TRAINING PROGRAM

## 2024-01-14 RX ORDER — LIDOCAINE 560 MG/1
1 PATCH PERCUTANEOUS; TOPICAL; TRANSDERMAL DAILY
Status: DISCONTINUED | OUTPATIENT
Start: 2024-01-14 | End: 2024-01-15 | Stop reason: HOSPADM

## 2024-01-14 RX ADMIN — ENOXAPARIN SODIUM 40 MG: 40 INJECTION SUBCUTANEOUS at 20:06

## 2024-01-14 RX ADMIN — Medication 3 MG: at 20:06

## 2024-01-14 RX ADMIN — LISINOPRIL 5 MG: 5 TABLET ORAL at 08:42

## 2024-01-14 RX ADMIN — DULOXETINE HYDROCHLORIDE 60 MG: 30 CAPSULE, DELAYED RELEASE ORAL at 08:42

## 2024-01-14 RX ADMIN — ASPIRIN 81 MG: 81 TABLET, CHEWABLE ORAL at 06:21

## 2024-01-14 RX ADMIN — LIDOCAINE 1 PATCH: 4 PATCH TOPICAL at 20:04

## 2024-01-14 RX ADMIN — LEVOTHYROXINE SODIUM 125 MCG: 125 TABLET ORAL at 06:21

## 2024-01-14 RX ADMIN — MORPHINE SULFATE 2 MG: 2 INJECTION, SOLUTION INTRAMUSCULAR; INTRAVENOUS at 04:35

## 2024-01-14 RX ADMIN — CYCLOBENZAPRINE HYDROCHLORIDE 5 MG: 10 TABLET, FILM COATED ORAL at 08:42

## 2024-01-14 RX ADMIN — CYCLOBENZAPRINE HYDROCHLORIDE 5 MG: 10 TABLET, FILM COATED ORAL at 15:24

## 2024-01-14 RX ADMIN — PANTOPRAZOLE SODIUM 40 MG: 40 TABLET, DELAYED RELEASE ORAL at 06:21

## 2024-01-14 RX ADMIN — SODIUM CHLORIDE 100 ML/HR: 9 INJECTION, SOLUTION INTRAVENOUS at 20:08

## 2024-01-14 RX ADMIN — ATORVASTATIN CALCIUM 80 MG: 80 TABLET, FILM COATED ORAL at 20:06

## 2024-01-14 RX ADMIN — METOPROLOL TARTRATE 25 MG: 25 TABLET, FILM COATED ORAL at 08:42

## 2024-01-14 RX ADMIN — POLYETHYLENE GLYCOL 3350 17 G: 17 POWDER, FOR SOLUTION ORAL at 08:49

## 2024-01-14 RX ADMIN — METOPROLOL TARTRATE 25 MG: 25 TABLET, FILM COATED ORAL at 20:06

## 2024-01-14 RX ADMIN — CYCLOBENZAPRINE HYDROCHLORIDE 5 MG: 10 TABLET, FILM COATED ORAL at 20:06

## 2024-01-14 RX ADMIN — CEFTRIAXONE SODIUM 1 G: 1 INJECTION, SOLUTION INTRAVENOUS at 15:23

## 2024-01-14 RX ADMIN — SODIUM CHLORIDE 100 ML/HR: 9 INJECTION, SOLUTION INTRAVENOUS at 08:52

## 2024-01-14 RX ADMIN — MORPHINE SULFATE 2 MG: 2 INJECTION, SOLUTION INTRAMUSCULAR; INTRAVENOUS at 08:49

## 2024-01-14 RX ADMIN — ENOXAPARIN SODIUM 40 MG: 40 INJECTION SUBCUTANEOUS at 08:49

## 2024-01-14 RX ADMIN — ALLOPURINOL 300 MG: 300 TABLET ORAL at 08:41

## 2024-01-14 RX ADMIN — ISOSORBIDE MONONITRATE 30 MG: 30 TABLET, EXTENDED RELEASE ORAL at 08:42

## 2024-01-14 ASSESSMENT — PAIN SCALES - GENERAL
PAINLEVEL_OUTOF10: 7
PAINLEVEL_OUTOF10: 8
PAINLEVEL_OUTOF10: 7

## 2024-01-14 ASSESSMENT — COGNITIVE AND FUNCTIONAL STATUS - GENERAL
DAILY ACTIVITIY SCORE: 24
MOBILITY SCORE: 24

## 2024-01-14 ASSESSMENT — PAIN DESCRIPTION - LOCATION
LOCATION: BACK
LOCATION: BACK

## 2024-01-14 ASSESSMENT — PAIN DESCRIPTION - DESCRIPTORS: DESCRIPTORS: ACHING;STABBING

## 2024-01-14 ASSESSMENT — PAIN - FUNCTIONAL ASSESSMENT: PAIN_FUNCTIONAL_ASSESSMENT: 0-10

## 2024-01-14 ASSESSMENT — PAIN DESCRIPTION - ORIENTATION: ORIENTATION: LEFT

## 2024-01-14 NOTE — PROGRESS NOTES
"Daily progress note    Kiara Ordaz is 63 y.o. female with history of hypertension hyperlipidemia hypothyroidism morbid obesity obstructive sleep apnea on CPAP presented to the ED with left flank pain.  CT abdomen pelvis did not show stone hydronephrosis or pyelonephritis  UA still positive for UTI and mild bump in creatinine and admitted for observation      Subjective  Patient still complains left flank pain  Pain is worse on movement          Vital signs in last 24 hours:  Temp:  [36.2 °C (97.2 °F)-37.2 °C (99 °F)] 37.2 °C (99 °F)  Heart Rate:  [56-66] 56  Resp:  [16-24] 18  BP: (129-207)/(61-90) 129/61  /61 (BP Location: Right arm, Patient Position: Sitting)   Pulse 56   Temp 37.2 °C (99 °F) (Temporal)   Resp 18   Ht 1.626 m (5' 4\")   Wt 109 kg (239 lb 3.2 oz)   SpO2 95%   BMI 41.06 kg/m²    Intake/Output last 3 shifts:  I/O last 3 completed shifts:  In: 1303.3 (12 mL/kg) [I.V.:753.3 (6.9 mL/kg); IV Piggyback:550]  Out: - (0 mL/kg)   Weight: 108.5 kg   Intake/Output this shift:  No intake/output data recorded.    Physical Exam  Constitutional:       General: She is not in acute distress.     Appearance: Normal appearance. She is not ill-appearing, toxic-appearing or diaphoretic.   HENT:      Head: Normocephalic and atraumatic.      Mouth/Throat:      Mouth: Mucous membranes are moist.      Pharynx: No oropharyngeal exudate.   Eyes:      Extraocular Movements: Extraocular movements intact.      Pupils: Pupils are equal, round, and reactive to light.   Cardiovascular:      Rate and Rhythm: Normal rate and regular rhythm.      Heart sounds: No murmur heard.  Pulmonary:      Effort: Pulmonary effort is normal. No respiratory distress.      Breath sounds: Normal breath sounds. No wheezing.   Abdominal:      General: Abdomen is flat. Bowel sounds are normal. There is no distension.      Tenderness: There is no abdominal tenderness. There is left CVA tenderness. There is no guarding or rebound. "   Musculoskeletal:         General: No swelling.      Right lower leg: No edema.      Left lower leg: No edema.   Skin:     Findings: No lesion or rash.   Neurological:      General: No focal deficit present.      Mental Status: She is alert and oriented to person, place, and time.      Cranial Nerves: No cranial nerve deficit.      Motor: No weakness.   Psychiatric:         Mood and Affect: Mood normal.         Behavior: Behavior normal.         Current medication   Current Facility-Administered Medications   Medication Dose Route Frequency Provider Last Rate Last Admin    acetaminophen (Tylenol) tablet 650 mg  650 mg oral q4h PRN Lula Frausto MD        Or    acetaminophen (Tylenol) oral liquid 650 mg  650 mg nasogastric tube q4h PRN Lula Frausto MD        Or    acetaminophen (Tylenol) suppository 650 mg  650 mg rectal q4h PRN Lula Frausto MD        acetaminophen (Tylenol) tablet 650 mg  650 mg oral q4h PRN Lula Frausto MD        Or    acetaminophen (Tylenol) oral liquid 650 mg  650 mg oral q4h PRN Lula Frausto MD        Or    acetaminophen (Tylenol) suppository 650 mg  650 mg rectal q4h PRN Lula Frausto MD        allopurinol (Zyloprim) tablet 300 mg  300 mg oral Daily Ambrosio Garcia MD   300 mg at 01/14/24 0841    aspirin chewable tablet 81 mg  81 mg oral Daily Ambrosio Garcia MD   81 mg at 01/14/24 0621    atorvastatin (Lipitor) tablet 80 mg  80 mg oral Daily Ambrosio Garcia MD   80 mg at 01/13/24 2229    cefTRIAXone (Rocephin) IVPB 1 g  1 g intravenous q24h Lula Frausto  mL/hr at 01/14/24 1523 1 g at 01/14/24 1523    cyclobenzaprine (Flexeril) tablet 5 mg  5 mg oral TID Lula Frausto MD   5 mg at 01/14/24 1524    DULoxetine (Cymbalta) DR capsule 60 mg  60 mg oral Daily Abmrosio Garcia MD   60 mg at 01/14/24 0842    enoxaparin (Lovenox) syringe 40 mg  40 mg subcutaneous q12h [facility] Lula Frausto MD   40 mg at 01/14/24 0849    isosorbide mononitrate ER (Imdur) 24  hr tablet 30 mg  30 mg oral Daily Ambrosio Garcia MD   30 mg at 01/14/24 0842    levothyroxine (Synthroid, Levoxyl) tablet 125 mcg  125 mcg oral Daily Ambrosio Garcia MD   125 mcg at 01/14/24 0621    lisinopril tablet 5 mg  5 mg oral Daily Ambrosio Garcia MD   5 mg at 01/14/24 0842    magnesium hydroxide (Milk of Magnesia) 2,400 mg/10 mL suspension 10 mL  10 mL oral Daily PRN Lula Frausto MD        melatonin tablet 3 mg  3 mg oral Daily Lula Frausto MD        metoprolol tartrate (Lopressor) tablet 25 mg  25 mg oral BID Ambrosio Garcia MD   25 mg at 01/14/24 0842    morphine injection 2 mg  2 mg intravenous q4h PRN Lula Frausto MD   2 mg at 01/14/24 0849    nitroglycerin (Nitrostat) SL tablet 0.4 mg  0.4 mg sublingual PRN Ambrosio Garcia MD        ondansetron (Zofran) tablet 4 mg  4 mg oral q8h PRN Lula Frausto MD        Or    ondansetron (Zofran) injection 4 mg  4 mg intravenous q8h PRN Lula Frausto MD        pantoprazole (ProtoNix) EC tablet 40 mg  40 mg oral Daily before breakfast Lula Frausto MD   40 mg at 01/14/24 0621    Or    pantoprazole (ProtoNix) injection 40 mg  40 mg intravenous Daily before breakfast Lula Frausto MD        polyethylene glycol (Glycolax, Miralax) packet 17 g  17 g oral Daily PRN Lula Frausto MD   17 g at 01/14/24 0849    sodium chloride 0.9% infusion  100 mL/hr intravenous Continuous Lula Frausto  mL/hr at 01/14/24 0852 100 mL/hr at 01/14/24 0852        Labs  Lab Results   Component Value Date    WBC 6.2 01/14/2024    HGB 11.6 (L) 01/14/2024    HCT 35.4 (L) 01/14/2024    MCV 92 01/14/2024     01/14/2024     Lab Results   Component Value Date    HGBA1C 6.2 (H) 12/22/2022     Lab Results   Component Value Date    GLUCOSE 107 (H) 01/14/2024    CALCIUM 8.7 01/14/2024     01/14/2024    K 4.0 01/14/2024    CO2 25 01/14/2024     01/14/2024    BUN 17 01/14/2024    CREATININE 1.28 (H) 01/14/2024           Principal  Problem:    UTI (urinary tract infection)      Assessment and Plan   #UTI  #Left flank pain likely musculoskeletal etiology  #Horseshoe kidney     Admit to medical floor  Send urine culture  UA positive  Continue Rocephin  Pain management  Morphine as needed for pain, lidocaine patch and Flexeril     #History of hypertension and hyperlipidemia  Resume home medications     #Obstructive sleep apnea  CPAP at night     #AMNADA  Continue IV hydration  Recheck creatinine in a.m.        #DVT prophylaxis Lovenox  Disposition possible discharge over the next 24 hours depending on resolution of pain  1/14/2024  Patient was seen and examined at bedside  Still complains of persistent left flank pain  Will get KUB and renal ultrasound  Continue antibiotics Flexeril and pain management  Lidocaine patch to the area  Will likely discharge over the next 24 hours if workup is negative on p.o. antibiotics.   LOS: 0 days

## 2024-01-15 ENCOUNTER — APPOINTMENT (OUTPATIENT)
Dept: CARDIOLOGY | Facility: CLINIC | Age: 64
End: 2024-01-15
Payer: MEDICARE

## 2024-01-15 VITALS
HEIGHT: 64 IN | WEIGHT: 239.2 LBS | OXYGEN SATURATION: 95 % | SYSTOLIC BLOOD PRESSURE: 172 MMHG | RESPIRATION RATE: 18 BRPM | BODY MASS INDEX: 40.84 KG/M2 | TEMPERATURE: 97.2 F | HEART RATE: 59 BPM | DIASTOLIC BLOOD PRESSURE: 77 MMHG

## 2024-01-15 LAB
ANION GAP SERPL CALC-SCNC: 11 MMOL/L (ref 10–20)
BUN SERPL-MCNC: 18 MG/DL (ref 6–23)
CALCIUM SERPL-MCNC: 8.9 MG/DL (ref 8.6–10.3)
CHLORIDE SERPL-SCNC: 104 MMOL/L (ref 98–107)
CO2 SERPL-SCNC: 26 MMOL/L (ref 21–32)
CREAT SERPL-MCNC: 1.3 MG/DL (ref 0.5–1.05)
EGFRCR SERPLBLD CKD-EPI 2021: 46 ML/MIN/1.73M*2
ERYTHROCYTE [DISTWIDTH] IN BLOOD BY AUTOMATED COUNT: 13.5 % (ref 11.5–14.5)
GLUCOSE SERPL-MCNC: 104 MG/DL (ref 74–99)
HCT VFR BLD AUTO: 35.5 % (ref 36–46)
HGB BLD-MCNC: 11.6 G/DL (ref 12–16)
HOLD SPECIMEN: NORMAL
MCH RBC QN AUTO: 30.3 PG (ref 26–34)
MCHC RBC AUTO-ENTMCNC: 32.7 G/DL (ref 32–36)
MCV RBC AUTO: 93 FL (ref 80–100)
NRBC BLD-RTO: 0 /100 WBCS (ref 0–0)
PLATELET # BLD AUTO: 192 X10*3/UL (ref 150–450)
POTASSIUM SERPL-SCNC: 4.1 MMOL/L (ref 3.5–5.3)
RBC # BLD AUTO: 3.83 X10*6/UL (ref 4–5.2)
SODIUM SERPL-SCNC: 137 MMOL/L (ref 136–145)
WBC # BLD AUTO: 6.1 X10*3/UL (ref 4.4–11.3)

## 2024-01-15 PROCEDURE — 2500000004 HC RX 250 GENERAL PHARMACY W/ HCPCS (ALT 636 FOR OP/ED): Performed by: STUDENT IN AN ORGANIZED HEALTH CARE EDUCATION/TRAINING PROGRAM

## 2024-01-15 PROCEDURE — 99239 HOSP IP/OBS DSCHRG MGMT >30: CPT | Performed by: STUDENT IN AN ORGANIZED HEALTH CARE EDUCATION/TRAINING PROGRAM

## 2024-01-15 PROCEDURE — 80048 BASIC METABOLIC PNL TOTAL CA: CPT | Performed by: STUDENT IN AN ORGANIZED HEALTH CARE EDUCATION/TRAINING PROGRAM

## 2024-01-15 PROCEDURE — 36415 COLL VENOUS BLD VENIPUNCTURE: CPT | Performed by: STUDENT IN AN ORGANIZED HEALTH CARE EDUCATION/TRAINING PROGRAM

## 2024-01-15 PROCEDURE — G0378 HOSPITAL OBSERVATION PER HR: HCPCS

## 2024-01-15 PROCEDURE — 2500000005 HC RX 250 GENERAL PHARMACY W/O HCPCS: Performed by: STUDENT IN AN ORGANIZED HEALTH CARE EDUCATION/TRAINING PROGRAM

## 2024-01-15 PROCEDURE — 2500000001 HC RX 250 WO HCPCS SELF ADMINISTERED DRUGS (ALT 637 FOR MEDICARE OP): Performed by: INTERNAL MEDICINE

## 2024-01-15 PROCEDURE — 2500000001 HC RX 250 WO HCPCS SELF ADMINISTERED DRUGS (ALT 637 FOR MEDICARE OP): Performed by: STUDENT IN AN ORGANIZED HEALTH CARE EDUCATION/TRAINING PROGRAM

## 2024-01-15 PROCEDURE — 85027 COMPLETE CBC AUTOMATED: CPT | Performed by: STUDENT IN AN ORGANIZED HEALTH CARE EDUCATION/TRAINING PROGRAM

## 2024-01-15 RX ORDER — CYCLOBENZAPRINE HCL 5 MG
5 TABLET ORAL 3 TIMES DAILY PRN
Qty: 21 TABLET | Refills: 0 | Status: SHIPPED | OUTPATIENT
Start: 2024-01-15 | End: 2024-02-17 | Stop reason: ALTCHOICE

## 2024-01-15 RX ORDER — DOCUSATE SODIUM 100 MG/1
100 CAPSULE, LIQUID FILLED ORAL 2 TIMES DAILY PRN
Qty: 30 CAPSULE | Refills: 1 | Status: SHIPPED | OUTPATIENT
Start: 2024-01-15 | End: 2024-02-14

## 2024-01-15 RX ORDER — CEPHALEXIN 250 MG/1
250 CAPSULE ORAL 4 TIMES DAILY
Qty: 28 CAPSULE | Refills: 0 | Status: SHIPPED | OUTPATIENT
Start: 2024-01-15 | End: 2024-01-25 | Stop reason: ALTCHOICE

## 2024-01-15 RX ADMIN — MORPHINE SULFATE 2 MG: 2 INJECTION, SOLUTION INTRAMUSCULAR; INTRAVENOUS at 08:41

## 2024-01-15 RX ADMIN — LISINOPRIL 5 MG: 5 TABLET ORAL at 08:50

## 2024-01-15 RX ADMIN — ENOXAPARIN SODIUM 40 MG: 40 INJECTION SUBCUTANEOUS at 08:41

## 2024-01-15 RX ADMIN — DULOXETINE HYDROCHLORIDE 60 MG: 30 CAPSULE, DELAYED RELEASE ORAL at 08:42

## 2024-01-15 RX ADMIN — SODIUM CHLORIDE 100 ML/HR: 9 INJECTION, SOLUTION INTRAVENOUS at 06:23

## 2024-01-15 RX ADMIN — ISOSORBIDE MONONITRATE 30 MG: 30 TABLET, EXTENDED RELEASE ORAL at 08:42

## 2024-01-15 RX ADMIN — LIDOCAINE 1 PATCH: 4 PATCH TOPICAL at 08:44

## 2024-01-15 RX ADMIN — METOPROLOL TARTRATE 25 MG: 25 TABLET, FILM COATED ORAL at 08:42

## 2024-01-15 RX ADMIN — LEVOTHYROXINE SODIUM 125 MCG: 125 TABLET ORAL at 06:23

## 2024-01-15 RX ADMIN — PANTOPRAZOLE SODIUM 40 MG: 40 TABLET, DELAYED RELEASE ORAL at 06:23

## 2024-01-15 RX ADMIN — ASPIRIN 81 MG: 81 TABLET, CHEWABLE ORAL at 06:23

## 2024-01-15 RX ADMIN — CYCLOBENZAPRINE HYDROCHLORIDE 5 MG: 10 TABLET, FILM COATED ORAL at 08:42

## 2024-01-15 RX ADMIN — ALLOPURINOL 300 MG: 300 TABLET ORAL at 08:42

## 2024-01-15 RX ADMIN — MORPHINE SULFATE 2 MG: 2 INJECTION, SOLUTION INTRAMUSCULAR; INTRAVENOUS at 01:45

## 2024-01-15 ASSESSMENT — COGNITIVE AND FUNCTIONAL STATUS - GENERAL
DAILY ACTIVITIY SCORE: 24
DAILY ACTIVITIY SCORE: 24
MOBILITY SCORE: 24
MOBILITY SCORE: 24

## 2024-01-15 ASSESSMENT — PAIN SCALES - GENERAL
PAINLEVEL_OUTOF10: 0 - NO PAIN
PAINLEVEL_OUTOF10: 7
PAINLEVEL_OUTOF10: 8

## 2024-01-15 ASSESSMENT — PAIN SCALES - WONG BAKER
WONGBAKER_NUMERICALRESPONSE: NO HURT
WONGBAKER_NUMERICALRESPONSE: NO HURT

## 2024-01-15 ASSESSMENT — PAIN DESCRIPTION - ORIENTATION: ORIENTATION: LEFT

## 2024-01-15 ASSESSMENT — PAIN DESCRIPTION - LOCATION
LOCATION: RIB CAGE
LOCATION: ABDOMEN

## 2024-01-15 ASSESSMENT — PAIN - FUNCTIONAL ASSESSMENT
PAIN_FUNCTIONAL_ASSESSMENT: WONG-BAKER FACES
PAIN_FUNCTIONAL_ASSESSMENT: 0-10

## 2024-01-15 NOTE — CARE PLAN
The patient's goals for the shift include      The clinical goals for the shift include Patient will tolerate pain with ambulation.

## 2024-01-15 NOTE — DISCHARGE SUMMARY
Discharge Diagnosis  UTI (urinary tract infection)  1. Left flank pain    2. Pyelonephritis    3. Intractable abdominal pain       Issues Requiring Follow-Up  Follow-up BMP with your PCP in 3 to 5 days    Test Results Pending At Discharge  Pending Labs       Order Current Status    Urine culture In process            Hospital Course  Kiara Ordaz is a 63 y.o. female with past medical history of hypertension, hyperlipidemia, hypothyroidism, morbid obesity, obstructive sleep apnea on CPAP, presented to the ED with complaint of left flank pain.  Patient was seen in the EDOn 1/10/2024 for similar complaint was complaining of left flank pain going on for days, during that time UA was sent was positive for UTI, CT abdomen pelvis with contrast was done and showed left lung base atelectasis and mucous plugging, no pulmonary emboli, horseshoe kidney, no acute renal abnormality and normal appendix, patient was sent home with Bactrim for UTI.  Patient returns back today with left flank pain which she reports has not resolved since her last discharge.  Today patient had mild increase in her creatinine from her baseline today it is around 1.3 likely from contrast that she has been getting since her last time presentation and the ED, UA still positive, urine culture from 1/10 did not show any growth, CT abdomen pelvis was repeated today and showed no acute finding in abdomen and pelvis moderate amount of stool in the colon no stone present, horseshoe kidney no hydronephrosis.  Patient admitted for pain as well as treatment of her UTI.  KUB and renal ultrasound was repeated as the patient was complaining of persistent pain, failed to show hydronephrosis, stones, pyelonephritis.  Patient has no leukocytosis.  Creatinine 1.3.  On IV hydration.  Patient to follow-up with her PCP and few days with a repeat BMP.  Otherwise patient can be discharged at home on Keflex today and follow-up outpatient with her PCP.  Patient will also be  discharged on Keflex for possible muscle spasms.  Discharge plan was discussed with the patient and patient in agreement.  Total discharge time spent 35 minutes    Pertinent Physical Exam At Time of Discharge  Physical Exam  Constitutional:       General: She is not in acute distress.     Appearance: Normal appearance. She is not ill-appearing, toxic-appearing or diaphoretic.   HENT:      Head: Normocephalic and atraumatic.      Mouth/Throat:      Mouth: Mucous membranes are moist.      Pharynx: No oropharyngeal exudate.   Eyes:      Extraocular Movements: Extraocular movements intact.      Pupils: Pupils are equal, round, and reactive to light.   Cardiovascular:      Rate and Rhythm: Normal rate and regular rhythm.      Heart sounds: No murmur heard.  Pulmonary:      Effort: Pulmonary effort is normal. No respiratory distress.      Breath sounds: Normal breath sounds. No wheezing.   Abdominal:      General: Abdomen is flat. Bowel sounds are normal. There is no distension.      Tenderness: There is no abdominal tenderness. There is left CVA tenderness. There is no guarding or rebound.   Musculoskeletal:         General: No swelling.      Right lower leg: No edema.      Left lower leg: No edema.   Skin:     Findings: No lesion or rash.   Neurological:      General: No focal deficit present.      Mental Status: She is alert and oriented to person, place, and time.      Cranial Nerves: No cranial nerve deficit.      Motor: No weakness.   Psychiatric:         Mood and Affect: Mood normal.         Behavior: Behavior normal.         Home Medications     Medication List      ASK your doctor about these medications     acetaminophen 325 mg tablet; Commonly known as: Tylenol   albuterol 90 mcg/actuation inhaler   allopurinol 300 mg tablet; Commonly known as: Zyloprim   aspirin 81 mg chewable tablet   atorvastatin 80 mg tablet; Commonly known as: Lipitor   DULoxetine 60 mg DR capsule; Commonly known as: Cymbalta    hydroCHLOROthiazide 25 mg tablet; Commonly known as: HYDRODiuril   isosorbide mononitrate ER 30 mg 24 hr tablet; Commonly known as: Imdur   levothyroxine 125 mcg tablet; Commonly known as: Synthroid, Levoxyl;   Take 1 tablet (125 mcg) by mouth once daily.   lisinopril 5 mg tablet   meloxicam 15 mg tablet; Commonly known as: Mobic   metoprolol tartrate 25 mg tablet; Commonly known as: Lopressor; Take 2   tablets in am and 1 tablet in pm   nitroglycerin 0.4 mg SL tablet; Commonly known as: Nitrostat   pantoprazole 40 mg EC tablet; Commonly known as: ProtoNix   potassium chloride CR 10 mEq ER tablet; Commonly known as: Klor-Con   sulfamethoxazole-trimethoprim 800-160 mg tablet; Commonly known as:   Bactrim DS; Take 1 tablet by mouth every 12 hours for 10 days.       Outpatient Follow-Up  Future Appointments   Date Time Provider Department Center   1/18/2024 10:15 AM Niall Miranda MD [id number] [place]   2/15/2024  7:45 PM SLEEP LAB [facility] ROOM 1 [facility]   5/23/2024 10:30 AM Sunny Byrnes MD [id number] [place]         Lula Frausto MD

## 2024-01-15 NOTE — PROGRESS NOTES
Patient relates that she has multiple joint and muscle concerns that limit her mobility. With this she has ongoing pain issues. She desires [facility] program.

## 2024-01-15 NOTE — NURSING NOTE
Education on new medications given with review of use and side effects. Reviewed discharge instructions with patient, no remaining questions. Patient agrees to keep follow-up appointment. Patient's son to pick-up patient.

## 2024-01-16 ENCOUNTER — PATIENT OUTREACH (OUTPATIENT)
Dept: PRIMARY CARE | Facility: CLINIC | Age: 64
End: 2024-01-16
Payer: MEDICARE

## 2024-01-16 ENCOUNTER — HOME MONITORING (OUTPATIENT)
Dept: HOME HEALTH SERVICES | Age: 64
End: 2024-01-16

## 2024-01-16 ENCOUNTER — PATIENT OUTREACH (OUTPATIENT)
Dept: HOME HEALTH SERVICES | Age: 64
End: 2024-01-16
Payer: MEDICARE

## 2024-01-16 LAB — BACTERIA UR CULT: NORMAL

## 2024-01-16 NOTE — PROGRESS NOTES
Discharge Facility: [facility]  Discharge Diagnosis: UTI (urinary tract infection); Left flank pain; Pyelonephritis; Intractable abdominal pain   Admission Date: 1/13/2024  Discharge Date: 1/15/2024    PCP Appointment Date: 1/18/2024  Specialist Appointment Date: 1/24 dermatology  Hospital Encounter and Summary: Linked   See discharge assessment below for further details  Engagement  Call Start Time: 1048 (1/16/2024 11:11 AM)    Medications  Medications reviewed with patient/caregiver?: Yes (meds discussed with patient) (1/16/2024 11:11 AM)  Is the patient having any side effects they believe may be caused by any medication additions or changes?: No (1/16/2024 11:11 AM)  Does the patient have all medications ordered at discharge?: Yes (1/16/2024 11:11 AM)  Care Management Interventions: No intervention needed (1/16/2024 11:11 AM)  Prescription Comments: see med list (flexeril; keflex; colace) (1/16/2024 11:11 AM)  Is the patient taking all medications as directed (includes completed medication regime)?: Yes (1/16/2024 11:11 AM)  Care Management Interventions: Provided patient education (1/16/2024 11:11 AM)    Appointments  Does the patient have a primary care provider?: Yes (1/16/2024 11:11 AM)  Care Management Interventions: Verified appointment date/time/provider (1/16/2024 11:11 AM)  Has the patient kept scheduled appointments due by today?: Yes (1/16/2024 11:11 AM)  Care Management Interventions: Advised patient to keep appointment (1/16/2024 11:11 AM)    Self Management  What is the home health agency?: denies need (1/16/2024 11:11 AM)    Patient Teaching  Does the patient have access to their discharge instructions?: Yes (1/16/2024 11:11 AM)  Care Management Interventions: Reviewed instructions with patient (1/16/2024 11:11 AM)  What is the patient's perception of their health status since discharge?: Improving (1/16/2024 11:11 AM)  Patient/Caregiver Education Comments: Patient states she is  still having quite a lot of pain and is unsure if the pain is still from kavin infection or anything else as she is having some issues with constipation as well. States she is using Tylenol, Flexeril, and ice to help with pain. Still using Keflex for UTI treatment. Verbalized understanding of needed follow up appts. (1/16/2024 11:11 AM)

## 2024-01-17 ENCOUNTER — APPOINTMENT (OUTPATIENT)
Dept: PRIMARY CARE | Facility: CLINIC | Age: 64
End: 2024-01-17
Payer: MEDICARE

## 2024-01-17 ENCOUNTER — PATIENT OUTREACH (OUTPATIENT)
Dept: HOME HEALTH SERVICES | Age: 64
End: 2024-01-17

## 2024-01-17 SDOH — SOCIAL STABILITY: SOCIAL INSECURITY: WITHIN THE LAST YEAR, HAVE YOU BEEN AFRAID OF YOUR PARTNER OR EX-PARTNER?: NO

## 2024-01-17 SDOH — SOCIAL STABILITY: SOCIAL NETWORK
IN A TYPICAL WEEK, HOW MANY TIMES DO YOU TALK ON THE PHONE WITH FAMILY, FRIENDS, OR NEIGHBORS?: MORE THAN THREE TIMES A WEEK

## 2024-01-17 SDOH — ECONOMIC STABILITY: FOOD INSECURITY: WITHIN THE PAST 12 MONTHS, YOU WORRIED THAT YOUR FOOD WOULD RUN OUT BEFORE YOU GOT MONEY TO BUY MORE.: NEVER TRUE

## 2024-01-17 SDOH — HEALTH STABILITY: MENTAL HEALTH: HOW OFTEN DO YOU HAVE 6 OR MORE DRINKS ON ONE OCCASION?: NEVER

## 2024-01-17 SDOH — HEALTH STABILITY: MENTAL HEALTH
STRESS IS WHEN SOMEONE FEELS TENSE, NERVOUS, ANXIOUS, OR CAN'T SLEEP AT NIGHT BECAUSE THEIR MIND IS TROUBLED. HOW STRESSED ARE YOU?: ONLY A LITTLE

## 2024-01-17 SDOH — HEALTH STABILITY: MENTAL HEALTH: HOW OFTEN DO YOU HAVE A DRINK CONTAINING ALCOHOL?: MONTHLY OR LESS

## 2024-01-17 SDOH — ECONOMIC STABILITY: FOOD INSECURITY: WITHIN THE PAST 12 MONTHS, THE FOOD YOU BOUGHT JUST DIDN'T LAST AND YOU DIDN'T HAVE MONEY TO GET MORE.: NEVER TRUE

## 2024-01-17 SDOH — SOCIAL STABILITY: SOCIAL INSECURITY
WITHIN THE LAST YEAR, HAVE YOU BEEN KICKED, HIT, SLAPPED, OR OTHERWISE PHYSICALLY HURT BY YOUR PARTNER OR EX-PARTNER?: NO

## 2024-01-17 SDOH — SOCIAL STABILITY: SOCIAL NETWORK: ARE YOU MARRIED, WIDOWED, DIVORCED, SEPARATED, NEVER MARRIED, OR LIVING WITH A PARTNER?: MARRIED

## 2024-01-17 SDOH — SOCIAL STABILITY: SOCIAL NETWORK
DO YOU BELONG TO ANY CLUBS OR ORGANIZATIONS SUCH AS CHURCH GROUPS UNIONS, FRATERNAL OR ATHLETIC GROUPS, OR SCHOOL GROUPS?: YES

## 2024-01-17 SDOH — SOCIAL STABILITY: SOCIAL INSECURITY: WITHIN THE LAST YEAR, HAVE YOU BEEN HUMILIATED OR EMOTIONALLY ABUSED IN OTHER WAYS BY YOUR PARTNER OR EX-PARTNER?: NO

## 2024-01-17 SDOH — ECONOMIC STABILITY: INCOME INSECURITY: IN THE PAST 12 MONTHS, HAS THE ELECTRIC, GAS, OIL, OR WATER COMPANY THREATENED TO SHUT OFF SERVICE IN YOUR HOME?: NO

## 2024-01-17 SDOH — HEALTH STABILITY: PHYSICAL HEALTH: ON AVERAGE, HOW MANY MINUTES DO YOU ENGAGE IN EXERCISE AT THIS LEVEL?: 0 MIN

## 2024-01-17 SDOH — HEALTH STABILITY: MENTAL HEALTH: HOW MANY STANDARD DRINKS CONTAINING ALCOHOL DO YOU HAVE ON A TYPICAL DAY?: 1 OR 2

## 2024-01-17 SDOH — SOCIAL STABILITY: SOCIAL NETWORK: HOW OFTEN DO YOU GET TOGETHER WITH FRIENDS OR RELATIVES?: THREE TIMES A WEEK

## 2024-01-17 SDOH — SOCIAL STABILITY: SOCIAL NETWORK: HOW OFTEN DO YOU ATTEND CHURCH OR RELIGIOUS SERVICES?: MORE THAN 4 TIMES PER YEAR

## 2024-01-17 SDOH — SOCIAL STABILITY: SOCIAL INSECURITY
WITHIN THE LAST YEAR, HAVE TO BEEN RAPED OR FORCED TO HAVE ANY KIND OF SEXUAL ACTIVITY BY YOUR PARTNER OR EX-PARTNER?: NO

## 2024-01-17 SDOH — SOCIAL STABILITY: SOCIAL NETWORK: HOW OFTEN DO YOU ATTENT MEETINGS OF THE CLUB OR ORGANIZATION YOU BELONG TO?: MORE THAN 4 TIMES PER YEAR

## 2024-01-17 SDOH — HEALTH STABILITY: PHYSICAL HEALTH: ON AVERAGE, HOW MANY DAYS PER WEEK DO YOU ENGAGE IN MODERATE TO STRENUOUS EXERCISE (LIKE A BRISK WALK)?: 0 DAYS

## 2024-01-17 ASSESSMENT — LIFESTYLE VARIABLES
AUDIT-C TOTAL SCORE: 1
SKIP TO QUESTIONS 9-10: 1

## 2024-01-17 NOTE — PROGRESS NOTES
[facility], and has agreed to enroll.    Patient's Address:   [address] [place] [id number]  **  If this is not the address patient will receive services - alert team and address in EMR**       Patient Contacts:  Extended Emergency Contact Information  Primary Emergency Contact: Tolu Ordaz  Address: [address] [place] [id number] [place]  Home Phone: [phone]  Mobile Phone: [phone]  Relation: Spouse                                Patient's Preferred Phone: [phone]  Patient's E-mail: [email or web address]

## 2024-01-18 ENCOUNTER — OFFICE VISIT (OUTPATIENT)
Dept: PRIMARY CARE | Facility: CLINIC | Age: 64
End: 2024-01-18
Payer: MEDICARE

## 2024-01-18 ENCOUNTER — PATIENT OUTREACH (OUTPATIENT)
Dept: HOME HEALTH SERVICES | Age: 64
End: 2024-01-18

## 2024-01-18 ENCOUNTER — APPOINTMENT (OUTPATIENT)
Dept: CARE COORDINATION | Age: 64
End: 2024-01-18
Payer: MEDICARE

## 2024-01-18 VITALS
DIASTOLIC BLOOD PRESSURE: 78 MMHG | OXYGEN SATURATION: 96 % | RESPIRATION RATE: 18 BRPM | SYSTOLIC BLOOD PRESSURE: 136 MMHG | BODY MASS INDEX: 41.45 KG/M2 | TEMPERATURE: 97.4 F | WEIGHT: 242.8 LBS | HEIGHT: 64 IN | HEART RATE: 58 BPM

## 2024-01-18 DIAGNOSIS — E03.9 ACQUIRED HYPOTHYROIDISM: ICD-10-CM

## 2024-01-18 DIAGNOSIS — M54.16 LUMBAR RADICULOPATHY: ICD-10-CM

## 2024-01-18 DIAGNOSIS — M47.16 LUMBAR SPONDYLOSIS WITH MYELOPATHY: ICD-10-CM

## 2024-01-18 DIAGNOSIS — Z09 HOSPITAL DISCHARGE FOLLOW-UP: Primary | ICD-10-CM

## 2024-01-18 DIAGNOSIS — E78.2 MIXED HYPERLIPIDEMIA: ICD-10-CM

## 2024-01-18 DIAGNOSIS — E66.01 MORBID OBESITY WITH BMI OF 40.0-44.9, ADULT (MULTI): ICD-10-CM

## 2024-01-18 DIAGNOSIS — I47.29 VENTRICULAR TACHYCARDIA, PAROXYSMAL (MULTI): ICD-10-CM

## 2024-01-18 DIAGNOSIS — I10 ESSENTIAL HYPERTENSION: ICD-10-CM

## 2024-01-18 PROBLEM — N39.0 ACUTE UTI: Status: ACTIVE | Noted: 2024-01-18

## 2024-01-18 PROBLEM — N39.0 UTI (URINARY TRACT INFECTION): Status: RESOLVED | Noted: 2024-01-13 | Resolved: 2024-01-18

## 2024-01-18 PROCEDURE — 1036F TOBACCO NON-USER: CPT | Performed by: FAMILY MEDICINE

## 2024-01-18 PROCEDURE — 1159F MED LIST DOCD IN RCRD: CPT | Performed by: FAMILY MEDICINE

## 2024-01-18 PROCEDURE — 3075F SYST BP GE 130 - 139MM HG: CPT | Performed by: FAMILY MEDICINE

## 2024-01-18 PROCEDURE — 3078F DIAST BP <80 MM HG: CPT | Performed by: FAMILY MEDICINE

## 2024-01-18 PROCEDURE — 1126F AMNT PAIN NOTED NONE PRSNT: CPT | Performed by: FAMILY MEDICINE

## 2024-01-18 PROCEDURE — 99495 TRANSJ CARE MGMT MOD F2F 14D: CPT | Performed by: FAMILY MEDICINE

## 2024-01-18 PROCEDURE — 1160F RVW MEDS BY RX/DR IN RCRD: CPT | Performed by: FAMILY MEDICINE

## 2024-01-18 PROCEDURE — 3008F BODY MASS INDEX DOCD: CPT | Performed by: FAMILY MEDICINE

## 2024-01-18 ASSESSMENT — ENCOUNTER SYMPTOMS
HEADACHES: 0
WHEEZING: 0
COUGH: 0
SHORTNESS OF BREATH: 0
CONSTIPATION: 0
DIFFICULTY URINATING: 0
PALPITATIONS: 0
FLANK PAIN: 1
DIZZINESS: 0
BOWEL INCONTINENCE: 0
WEAKNESS: 0
BACK PAIN: 1
SORE THROAT: 0
DYSURIA: 0
BLOOD IN STOOL: 0
FREQUENCY: 0
VOMITING: 0
DIARRHEA: 0
RHINORRHEA: 0
NUMBNESS: 1
HEMATURIA: 0
FEVER: 0
ABDOMINAL PAIN: 1
TREMORS: 0
TINGLING: 1
CHILLS: 0

## 2024-01-18 ASSESSMENT — PATIENT HEALTH QUESTIONNAIRE - PHQ9
2. FEELING DOWN, DEPRESSED OR HOPELESS: NOT AT ALL
SUM OF ALL RESPONSES TO PHQ9 QUESTIONS 1 AND 2: 0
1. LITTLE INTEREST OR PLEASURE IN DOING THINGS: NOT AT ALL

## 2024-01-18 NOTE — ASSESSMENT & PLAN NOTE
We will order X-Ray of the Lumbar Spine, X-Ray of the Thoracic Spine and MRI of the Lumbar Spine for further evaluation.

## 2024-01-18 NOTE — PATIENT INSTRUCTIONS
BMI was above normal measurement. Current weight: 110 kg (242 lb 12.8 oz)  Weight change since last visit (-) denotes wt loss 3.6 lbs   Weight loss needed to achieve BMI 25: 97.5 Lbs  Weight loss needed to achieve BMI 30: 68.4 Lbs  Advised to Increase physical activity.

## 2024-01-18 NOTE — PROGRESS NOTES
"Chief Complaint   Patient presents with    Hospital Follow-up     1/13- 1/15/2024 UTI and Left Flank Pain    Follow-up     Hyperlipidemia, Hypertension, and Hypothyroidism       Subjective   Patient ID: Kiara Ordaz is a 63 y.o. female who presents for Hospital Follow-up (1/13- 1/15/2024 UTI and Left Flank Pain) and Follow-up (Hyperlipidemia, Hypertension, and Hypothyroidism ).    She presents today for follow-up after being discharged from the hospital 4 days ago. The main problem requiring admission was UTI, and flank pain. The discharge summary and/or Transitional Care Management documentation was reviewed. Medication reconciliation was performed as indicated via the \"José as Reviewed\" timestamp.     She presents today for follow up on UTI. Onset was several weeks ago. Current symptoms include left flank pain that radiates to her lower back. She describes the pain as severe aching and discomfort. She has no dysuria, hematuria, fever, headache, or vaginal discharge. Patient does not have a history of recurrent UTI. Patient does have a history of pyelonephritis.     She complains of abdominal pain mainly in the left lumbar region radiating down the left side of the abdomen and up the left flank to the lower thoracic region of the spine and the upper lumbar region.  The pain has been intense.  She had been to the emergency room on 2 different occasion and was admitted on the second occasion.  She had no urinary symptoms dysuria frequency or urgency and no hematuria.  She was diagnosed with acute pyelonephritis but had 2 negative urine culture.  She is currently on antibiotic for possible pyelonephritis despite due to negative urine culture result.  She has no fever or chills.  Patient is here for follow-up on hypertension and hyperlipidemia. She has no chest pain, dyspnea, exertional chest pressure/discomfort, near-syncope, orthopnea, palpitations, paroxysmal nocturnal dyspnea, and syncope. Taking her medication " "regularly with no side effects.    Kiara Ordaz was contacted by Transitional Care Management services two days after her discharge. This encounter and supporting documentation was reviewed.    The complexity of medical decision making for this patient's transitional care is moderate.    Back Pain  The current episode started more than 1 month ago. The problem occurs constantly. The problem is unchanged. The pain is present in the lumbar spine. The quality of the pain is described as aching. The pain is severe. The pain is The same all the time. Exacerbated by: movement. Associated symptoms include abdominal pain, numbness and tingling. Pertinent negatives include no bowel incontinence, chest pain, dysuria, fever, headaches or weakness.          Review of Systems   Constitutional:  Negative for chills and fever.   HENT:  Negative for congestion, ear pain, nosebleeds, rhinorrhea and sore throat.    Respiratory:  Negative for cough, shortness of breath and wheezing.    Cardiovascular:  Negative for chest pain, palpitations and leg swelling.   Gastrointestinal:  Positive for abdominal pain. Negative for blood in stool, bowel incontinence, constipation, diarrhea and vomiting.   Genitourinary:  Positive for flank pain. Negative for difficulty urinating, dysuria, frequency and hematuria.   Musculoskeletal:  Positive for back pain.   Neurological:  Positive for tingling and numbness. Negative for dizziness, tremors, weakness and headaches.     Objective   /78   Pulse 58   Temp 36.3 °C (97.4 °F)   Resp 18   Ht 1.626 m (5' 4\")   Wt 110 kg (242 lb 12.8 oz)   SpO2 96%   BMI 41.68 kg/m²     Physical Exam  Constitutional:       General: She is not in acute distress.     Appearance: Normal appearance.   HENT:      Head: Normocephalic and atraumatic.      Mouth/Throat:      Mouth: Mucous membranes are moist.      Pharynx: Oropharynx is clear. No oropharyngeal exudate or posterior oropharyngeal erythema.   Eyes:    "   General: No scleral icterus.     Extraocular Movements: Extraocular movements intact.      Pupils: Pupils are equal, round, and reactive to light.   Cardiovascular:      Rate and Rhythm: Normal rate and regular rhythm.      Pulses: Normal pulses.      Heart sounds: No murmur heard.     No friction rub. No gallop.   Pulmonary:      Effort: Pulmonary effort is normal.      Breath sounds: No wheezing, rhonchi or rales.   Abdominal:      Comments: There is tenderness over the left lumbar region and the left paraspinal region of the lower thoracic and the upper lumbar spine.  No midline tenderness of the spine.  Straight leg raising was negative.  No significant tenderness in the abdomen and no guarding and no rebound tenderness.  Bowel sounds was normal.   Skin:     General: Skin is warm.      Coloration: Skin is not jaundiced or pale.      Findings: No erythema or rash.   Neurological:      General: No focal deficit present.      Mental Status: She is alert and oriented to person, place, and time.      Cranial Nerves: No cranial nerve deficit.      Sensory: No sensory deficit.      Coordination: Coordination normal.      Gait: Gait normal.       === 01/13/24 ===    CT ABDOMEN PELVIS WO IV CONTRAST    - Impression -  1.  No acute finding in the abdomen and pelvis on this unenhanced  study.  2. Moderate amount of retained stool in the colon with no evidence of  bowel obstruction.  3. 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  4. No hydroureteronephrosis    MACRO:  None      Assessment/Plan   Problem List Items Addressed This Visit       Mixed hyperlipidemia     The nature of cardiac risk has been fully discussed with this patient. Discussed cardiovascular risk analysis and appropriate diet with the need for lifelong measures to reduce the risk. A regular exercise program is recommended to help achieve and maintain normal body weight, fitness and improve lipid  balance. Patient education provided. They understand and agree with this course of treatment. They will return with new or worsening symptoms. Patient instructed to remain current with appropriate annual health maintenance.          Essential hypertension     Well-controlled, continue current medications and management.         Ventricular tachycardia, paroxysmal (CMS/HCC)     Chronic Condition Documentation : Stable based on symptoms and exam.  Continue established treatment plan and follow-up at least yearly.         Morbid obesity with BMI of 40.0-44.9, adult (CMS/[facility])     Continue decrease calorie diet and not more than 1500 calorie per day diet and low-fat diet.  Continue with regular exercise program.  We advised exercise at least 5 days a week for at least 45 minutes and also a minimum of 10,000 steps a day.  The detrimental effects of obesity on health were discussed.         Hypothyroidism     Stable, continue current medications and management.         Lumbar radiculopathy     We will order X-Ray of the Lumbar Spine, X-Ray of the Thoracic Spine and MRI of the Lumbar Spine for further evaluation.         Relevant Orders    XR lumbar spine complete 4+ views    MR lumbar spine wo IV contrast    XR thoracic spine 3 views    Lumbar spondylosis with myelopathy     We will order MRI of the Lumbar Spine for further evaluation.         Relevant Orders    MR lumbar spine wo IV contrast     Other Visit Diagnoses       Hospital discharge follow-up    -  Primary          Engagement  Call Start Time: 1048 (1/16/2024 11:11 AM)    Medications  Medications reviewed with patient/caregiver?: Yes (meds discussed with patient) (1/16/2024 11:11 AM)  Is the patient having any side effects they believe may be caused by any medication additions or changes?: No (1/16/2024 11:11 AM)  Does the patient have all medications ordered at discharge?: Yes (1/16/2024 11:11 AM)  Care Management Interventions: No intervention needed (1/16/2024  11:11 AM)  Prescription Comments: see med list (flexeril; keflex; colace) (1/16/2024 11:11 AM)  Is the patient taking all medications as directed (includes completed medication regime)?: Yes (1/16/2024 11:11 AM)  Care Management Interventions: Provided patient education (1/16/2024 11:11 AM)    Appointments  Does the patient have a primary care provider?: Yes (1/16/2024 11:11 AM)  Care Management Interventions: Verified appointment date/time/provider (1/16/2024 11:11 AM)  Has the patient kept scheduled appointments due by today?: Yes (1/16/2024 11:11 AM)  Care Management Interventions: Advised patient to keep appointment (1/16/2024 11:11 AM)    Self Management  What is the home health agency?: denies need (1/16/2024 11:11 AM)    Patient Teaching  Does the patient have access to their discharge instructions?: Yes (1/16/2024 11:11 AM)  Care Management Interventions: Reviewed instructions with patient (1/16/2024 11:11 AM)  What is the patient's perception of their health status since discharge?: Improving (1/16/2024 11:11 AM)  Patient/Caregiver Education Comments: Patient states she is still having quite a lot of pain and is unsure if the pain is still from kavin infection or anything else as she is having some issues with constipation as well. States she is using Tylenol, Flexeril, and ice to help with pain. Still using Keflex for UTI treatment. Verbalized understanding of needed follow up appts. (1/16/2024 11:11 AM)    Scribe Attestation  By signing my name below, I, Carl Faust, Scribe   attest that this documentation has been prepared under the direction and in the presence of Niall Miranda MD.

## 2024-01-18 NOTE — PROGRESS NOTES
"Initial HHVC completed with Dr. Singh and Johnathan. Pt states she is not feeling well after discharge. C/o significant back pain. Saw PCP today. PCP thinking pt might have a pinched nerve in lower back- has ordered XR and MRI. Pt okay with this plan. Taking muscle relaxer w minimal relief. Pt on abx, taking as prescribed and has no complaints. Pt not monitoring weights or VS at home. States her last known BP was \"130(something)/70(something)\". Pt educated on importance of monitoring VS and weights. Verbalizes understanding. Pt wears CPAP at night- has sleep study in February. All medications reconciled with pharmacist. Pt has no questions/concerns/needs regarding medications. Taking all as prescribed. Pt aware of all upcoming appts. Denies any further questions/concerns/needs. [facility]     Patient's Address:   [address] [place] [address]  **  If this is not the address patient will receive services - alert team and address in EMR**       Patient Contacts:  Extended Emergency Contact Information  Primary Emergency Contact: Tolu Ordaz  Address: [address] [place] [address] [place]  Home Phone: [phone]  Mobile Phone: [phone]  Relation: Spouse                                Patient's Preferred Phone: [phone]  Patient's E-mail: [email or web address]                                      "

## 2024-01-19 ENCOUNTER — PATIENT OUTREACH (OUTPATIENT)
Dept: HOME HEALTH SERVICES | Age: 64
End: 2024-01-19
Payer: MEDICARE

## 2024-01-19 NOTE — PROGRESS NOTES
Daily Call Note:    1/19/24 Daily call completed. Patient complaint of continuous 3/10 left flank pain. Continues to take Flexeril to manage pain. Pain management somewhat effective. Complaint of swelling in the abdomen area. Denies any weight gain. Patient has an MRI and X-ray scheduled for her back next week. Continues to increase water intake and take antibiotics as prescribed.   Pt Education: Patient educated on increasing water intake.  Barriers: None  Topics for Daily Review: Managing pain and increasing fluid intake  Pt demonstrates clear understanding: Yes    Daily Weight:  There were no vitals filed for this visit.   Last 3 Weights:  Wt Readings from Last 7 Encounters:   01/18/24 110 kg (242 lb 12.8 oz)   01/13/24 109 kg (239 lb 3.2 oz)   01/10/24 109 kg (240 lb)   11/21/23 110 kg (242 lb 12.8 oz)   10/20/23 110 kg (242 lb 6.4 oz)   07/11/23 107 kg (235 lb)   05/23/23 109 kg (241 lb 4 oz)       Masimo Device: No   Masimo Clinical Impression: N/A    Virtual Visits--Scheduled (Most Recent Date at Top)  Follow up Appointments  Recent Visits  Date Type Provider Dept   01/18/24 Office Visit Niall Miranda MD Do [id number] [facility]   Showing recent visits within past 30 days and meeting all other requirements  Future Appointments  No visits were found meeting these conditions.  Showing future appointments within next 90 days and meeting all other requirements       Frequency of RN Calls & Virtual Visits per Team Agreement: [facility] Home Frequency: Daily    Medication issues Addressed (what was done):     Follow up appointments scheduled by [facility] Staff:   Referrals made by [facility] staff:       [facility] Care Transitions Assessment    Patient's Address:   [address] [place] [id number]  **  If this is not the address patient will receive services - alert team and address in EMR**       Patient Contacts:  Extended Emergency Contact Information  Primary Emergency Contact: Tolu Ordaz  Address: [address]  [place] [address] [place]  Home Phone: [phone]  Mobile Phone: [phone]  Relation: Spouse                                Patient's Preferred Phone: [phone]  Patient's E-mail: [email or web address]

## 2024-01-20 ENCOUNTER — PATIENT OUTREACH (OUTPATIENT)
Dept: HOME HEALTH SERVICES | Age: 64
End: 2024-01-20
Payer: MEDICARE

## 2024-01-20 NOTE — PROGRESS NOTES
Daily Call Note: Daily call complete.  Pt reports she is feeling well, pain is improving.  Taking meds as ordered.  Has MRI this Wednesday.  B/P 174/81- pt has not taken B/P meds yet.  Pt will repeat B/P later today.      Pt Education:  pain. B/P   Barriers:  Topics for Daily Review: vitals, weight   Pt demonstrates clear understanding: Yes    Daily Weight:  237.4 lbs   There were no vitals filed for this visit.   Last 3 Weights:  Wt Readings from Last 7 Encounters:   01/18/24 110 kg (242 lb 12.8 oz)   01/13/24 109 kg (239 lb 3.2 oz)   01/10/24 109 kg (240 lb)   11/21/23 110 kg (242 lb 12.8 oz)   10/20/23 110 kg (242 lb 6.4 oz)   07/11/23 107 kg (235 lb)   05/23/23 109 kg (241 lb 4 oz)       Masimo Device: No   Masimo Clinical Impression:     Virtual Visits--Scheduled (Most Recent Date at Top)  Follow up Appointments  Recent Visits  Date Type Provider Dept   01/18/24 Office Visit Niall Miranda MD Do [id number] [facility]   Showing recent visits within past 30 days and meeting all other requirements  Future Appointments  No visits were found meeting these conditions.  Showing future appointments within next 90 days and meeting all other requirements       Frequency of RN Calls & Virtual Visits per Team Agreement: [facility] Home Frequency: Daily    Medication issues Addressed (what was done):     Follow up appointments scheduled by [facility] Staff:   Referrals made by [facility] staff:       [facility] Care Transitions Assessment    Patient's Address:   [address] [place], [place] [id number]  **  If this is not the address patient will receive services - alert team and address in EMR**       Patient Contacts:  Extended Emergency Contact Information  Primary Emergency Contact: Tolu Ordaz  Address: [address] [place] [address] [place]  Home Phone: [phone]  Mobile Phone: [phone]  Relation: Spouse                                Patient's Preferred Phone:  [phone]  Patient's E-mail: [email or web address]

## 2024-01-23 ENCOUNTER — PATIENT OUTREACH (OUTPATIENT)
Dept: HOME HEALTH SERVICES | Age: 64
End: 2024-01-23
Payer: MEDICARE

## 2024-01-23 VITALS — WEIGHT: 239 LBS | BODY MASS INDEX: 41.02 KG/M2

## 2024-01-23 NOTE — PROGRESS NOTES
"Daily Call Note:   Pt. stated she is getting an MRI tomorrow regarding back pain that has been ongoing, with tingling and numbness in her feet. She stated she is also getting X-rays of her back, at the same appt. tomorrow. Pt. stated she has \"no\" pain in her flank like she had last week, she is voiding well, no issues, and is having BM's everyday, sometime 2X a day. Pt stated that is why she stopped taking her stool softener. She is continuing the ATB. It should have been completed on 1/21, but pt. stated she got off time, so she is still taking, and has a few more left. Instructed pt. to finish and she stated she would finish. Pt. stated her BP yesterday was 185/67. Pt stated she a \"very old BP cuff\" that was used years ago. RN stated she will call the pt back after pt re-checks when her  returns home at 1500.    Pt Education: Pt. Educated on finishing her ATB, and re-checking BP when abnormally high or low.  Topics for Daily Review: Flank pain, BP.  Pt demonstrates clear understanding: Yes    Daily Weight:  Wt 108 kg (239 lb)   BMI 41.02 kg/m²     Last 3 Weights:  Wt Readings from Last 7 Encounters:   01/18/24 110 kg (242 lb 12.8 oz)   01/13/24 109 kg (239 lb 3.2 oz)   01/10/24 109 kg (240 lb)   11/21/23 110 kg (242 lb 12.8 oz)   10/20/23 110 kg (242 lb 6.4 oz)   07/11/23 107 kg (235 lb)   05/23/23 109 kg (241 lb 4 oz)       Masimo Device: No   Masimo Clinical Impression: N/A    Virtual Visits--Scheduled (Most Recent Date at Top)  Follow up Appointments  Recent Visits  Date Type Provider Dept   01/18/24 Office Visit Niall Miranda MD Do [id number] [facility]   Showing recent visits within past 30 days and meeting all other requirements  Future Appointments  No visits were found meeting these conditions.  Showing future appointments within next 90 days and meeting all other requirements       Frequency of RN Calls & Virtual Visits per Team Agreement: [facility] Home Frequency: Daily              "

## 2024-01-23 NOTE — PROGRESS NOTES
Patient called to report repeat /70. She states she has 2 appointments tomorrow and won't be available until after 3:00.    Patient's Address:   [address] [place] [address]  **  If this is not the address patient will receive services - alert team and address in EMR**       Patient Contacts:  Extended Emergency Contact Information  Primary Emergency Contact: Tolu Ordaz  Address: [address] [place] [address] [place] [facility]  Home Phone: [phone]  Mobile Phone: [phone]  Relation: Spouse                                Patient's Preferred Phone: [phone]  Patient's E-mail: [email or web address]

## 2024-01-24 ENCOUNTER — HOSPITAL ENCOUNTER (OUTPATIENT)
Dept: RADIOLOGY | Facility: HOSPITAL | Age: 64
Discharge: HOME | End: 2024-01-24
Payer: MEDICARE

## 2024-01-24 DIAGNOSIS — M54.16 LUMBAR RADICULOPATHY: ICD-10-CM

## 2024-01-24 DIAGNOSIS — M47.16 LUMBAR SPONDYLOSIS WITH MYELOPATHY: ICD-10-CM

## 2024-01-24 PROCEDURE — 72110 X-RAY EXAM L-2 SPINE 4/>VWS: CPT

## 2024-01-24 PROCEDURE — 72148 MRI LUMBAR SPINE W/O DYE: CPT

## 2024-01-24 PROCEDURE — 72072 X-RAY EXAM THORAC SPINE 3VWS: CPT

## 2024-01-24 PROCEDURE — 72110 X-RAY EXAM L-2 SPINE 4/>VWS: CPT | Performed by: RADIOLOGY

## 2024-01-24 PROCEDURE — 72072 X-RAY EXAM THORAC SPINE 3VWS: CPT | Performed by: RADIOLOGY

## 2024-01-24 PROCEDURE — 72148 MRI LUMBAR SPINE W/O DYE: CPT | Performed by: RADIOLOGY

## 2024-01-25 ENCOUNTER — PATIENT OUTREACH (OUTPATIENT)
Dept: HOME HEALTH SERVICES | Age: 64
End: 2024-01-25

## 2024-01-25 NOTE — PROGRESS NOTES
Daily Call Note: [facility] weekly call complete w this RN, [facility], and Dr Gan.  Pt reports she is feeling much better, pain greatly improved.  Had MRI yesterday, will follow up w PCP 2/16/24.  Pt  slowly returning  to normal activities.  B/P 159/77.          Pt Education:   Barriers:   Topics for Daily Review:   Pt demonstrates clear understanding: Yes    Daily Weight:  There were no vitals filed for this visit.   Last 3 Weights:  Wt Readings from Last 7 Encounters:   01/23/24 108 kg (239 lb)   01/18/24 110 kg (242 lb 12.8 oz)   01/13/24 109 kg (239 lb 3.2 oz)   01/10/24 109 kg (240 lb)   11/21/23 110 kg (242 lb 12.8 oz)   10/20/23 110 kg (242 lb 6.4 oz)   07/11/23 107 kg (235 lb)       Masimo Device: No   Masimo Clinical Impression:     Virtual Visits--Scheduled (Most Recent Date at Top)  Follow up Appointments  Recent Visits  Date Type Provider Dept   01/18/24 Office Visit Niall Miranda MD Do [id number] [facility]   Showing recent visits within past 30 days and meeting all other requirements  Future Appointments  Date Type Provider Dept   02/16/24 Appointment Niall Miranda MD Do [id number] [facility]   Showing future appointments within next 90 days and meeting all other requirements       Frequency of RN Calls & Virtual Visits per Team Agreement: [facility] at Home Frequency: Daily    Medication issues Addressed (what was done):     Follow up appointments scheduled by [facility] Staff:   Referrals made by [facility] staff:       [facility] Care Transitions Assessment    Patient's Address:   [address] [place], [place] [id number]  **  If this is not the address patient will receive services - alert team and address in EMR**       Patient Contacts:  Extended Emergency Contact Information  Primary Emergency Contact: Tolu Ordaz  Address: [address] [place] [address] [facility] [place]  Home Phone: [phone]  Mobile Phone: [phone]  Relation: Spouse                                 Patient's Preferred Phone: [phone]  Patient's E-mail: [email or web address]

## 2024-01-26 ENCOUNTER — PATIENT OUTREACH (OUTPATIENT)
Dept: CARE COORDINATION | Age: 64
End: 2024-01-26
Payer: MEDICARE

## 2024-01-26 VITALS
BODY MASS INDEX: 40.41 KG/M2 | SYSTOLIC BLOOD PRESSURE: 126 MMHG | HEART RATE: 60 BPM | WEIGHT: 235.4 LBS | DIASTOLIC BLOOD PRESSURE: 62 MMHG

## 2024-01-26 NOTE — PROGRESS NOTES
Daily Call Note:   Daily call complete. Pt states she feels ok, but having slight discomfort in anterior area above and mid to left hip. Is already taking tylenol ATC and is hoping it is not kidney issues. No fevers, advised cranberry juice for now and to watch for worsening symptoms.   States she had MRI 1/24 as ordered.   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     Pt Education: POC  Barriers: na  Topics for Daily Review: POC  Pt demonstrates clear understanding: Yes    Daily Weight:  There were no vitals filed for this visit.   Last 3 Weights:  Wt Readings from Last 7 Encounters:   01/23/24 108 kg (239 lb)   01/18/24 110 kg (242 lb 12.8 oz)   01/13/24 109 kg (239 lb 3.2 oz)   01/10/24 109 kg (240 lb)   11/21/23 110 kg (242 lb 12.8 oz)   10/20/23 110 kg (242 lb 6.4 oz)   07/11/23 107 kg (235 lb)       Masimo Device: No   Masimo Clinical Impression: na    Virtual Visits--Scheduled (Most Recent Date at Top)  Follow up Appointments  Recent Visits  Date Type Provider Dept   01/25/24 Telemedicine Sheryl Gan MD M[facility]   01/18/24 Office Visit Niall Miranda MD Do [id number] [facility]   Showing recent visits within past 30 days and meeting all other requirements  Future Appointments  Date Type Provider Dept   02/01/24 Appointment [facility]   02/16/24 Appointment Niall Miranda MD Do [id number] [facility]   Showing future appointments within next 90 days and meeting all other requirements       Frequency of RN Calls & Virtual Visits per Team Agreement: [facility] Home Frequency: Daily    Medication issues Addressed (what was done): na    Follow up appointments scheduled by [facility] Staff: na  Referrals made by [facility] staff: na      [facility] Care Transitions Assessment    Patient's Address:   [address] [place] [address]  **  If this is not the address patient will receive services - alert team and address  in EMR**       Patient Contacts:  Extended Emergency Contact Information  Primary Emergency Contact: Tolu Ordaz  Address: [address] [place] [address] [place]  Home Phone: [phone]  Mobile Phone: [phone]  Relation: Spouse                                Patient's Preferred Phone: [phone]  Patient's E-mail: [email or web address]

## 2024-01-27 ENCOUNTER — PATIENT OUTREACH (OUTPATIENT)
Dept: HOME HEALTH SERVICES | Age: 64
End: 2024-01-27
Payer: MEDICARE

## 2024-01-27 VITALS — BODY MASS INDEX: 40.51 KG/M2 | WEIGHT: 236 LBS

## 2024-01-27 NOTE — PROGRESS NOTES
Daily Call Note:   Spoke to pt. today and she stated she was up late last night and was just getting up (1330 today.) She stated she started drinking cranberry juice recently, and it was helping. She stated she does not have any pain on urination. She stated also that she had no lower back or hip pain at this time, it has subsided quite a bit, but was only up once to void, and has not been fully up walking around yet. Waiting on MRI results from the other day and has finished ATB.    Pt Education:  Continue cranberry juice daily. Monitor daily BP's.  Topics for Daily Review: Back / leg pain and any problems voiding or pain on urination.  Pt demonstrates clear understanding: Yes    Daily Weight:  Vitals:    01/27/24 1345   Weight: 107 kg (236 lb)      Last 3 Weights:  Wt Readings from Last 7 Encounters:   01/27/24 107 kg (236 lb)   01/26/24 107 kg (235 lb 6.4 oz)   01/23/24 108 kg (239 lb)   01/18/24 110 kg (242 lb 12.8 oz)   01/13/24 109 kg (239 lb 3.2 oz)   01/10/24 109 kg (240 lb)   11/21/23 110 kg (242 lb 12.8 oz)       Virtual Visits--Scheduled (Most Recent Date at Top)  Follow up Appointments  Recent Visits  Date Type Provider Dept   01/18/24 Office Visit Niall Miranda MD Do [id number] [facility]   Showing recent visits within past 30 days and meeting all other requirements  Future Appointments  Date Type Provider Dept   02/16/24 Appointment Niall Miranda MD Do [id number] [facility]   Showing future appointments within next 90 days and meeting all other requirements       Frequency of RN Calls & Virtual Visits per Team Agreement: Healthy at Home Frequency: Daily  [facility] @ 1300

## 2024-01-30 ENCOUNTER — PATIENT OUTREACH (OUTPATIENT)
Dept: HOME HEALTH SERVICES | Age: 64
End: 2024-01-30
Payer: MEDICARE

## 2024-01-30 VITALS — WEIGHT: 237.4 LBS | BODY MASS INDEX: 40.75 KG/M2

## 2024-01-30 NOTE — PROGRESS NOTES
Daily Call Note:   Daily call, pt states she is feeling great, she is getting ready for a social outing and finally feels better. Denies any pain or other symptoms. Weight stable, she has not taken bp yet today but has been running fine. Next [facility] reviewed, no other questions or concerns.     Pt Education: POC  Barriers: na  Topics for Daily Review: na  Pt demonstrates clear understanding: Yes    Daily Weight:  Vitals:    01/30/24 1359   Weight: 108 kg (237 lb 6.4 oz)      Last 3 Weights:  Wt Readings from Last 7 Encounters:   01/30/24 108 kg (237 lb 6.4 oz)   01/27/24 107 kg (236 lb)   01/26/24 107 kg (235 lb 6.4 oz)   01/23/24 108 kg (239 lb)   01/18/24 110 kg (242 lb 12.8 oz)   01/13/24 109 kg (239 lb 3.2 oz)   01/10/24 109 kg (240 lb)       Masimo Device: No   Masimo Clinical Impression: na    Virtual Visits--Scheduled (Most Recent Date at Top)  Follow up Appointments  Recent Visits  Date Type Provider Dept   01/18/24 Office Visit Niall Miranda MD Do [id number] [facility]   Showing recent visits within past 30 days and meeting all other requirements  Future Appointments  Date Type Provider Dept   02/16/24 Appointment Niall Miranda MD Do [id number] [facility]   Showing future appointments within next 90 days and meeting all other requirements       Frequency of RN Calls & Virtual Visits per Team Agreement: Healthy at Home Frequency: Daily    Medication issues Addressed (what was done): na    Follow up appointments scheduled by [facility] Staff: na  Referrals made by [facility] staff: na      [facility] Care Transitions Assessment    Patient's Address:   [address] [place] [address]  **  If this is not the address patient will receive services - alert team and address in EMR**       Patient Contacts:  Extended Emergency Contact Information  Primary Emergency Contact: Tolu Ordaz  Address: [address] [place] [address] [place]  Home Phone: [phone]  Mobile Phone:  [phone]  Relation: Spouse                                Patient's Preferred Phone: [phone]  Patient's E-mail: [email or web address]

## 2024-01-31 ENCOUNTER — PATIENT OUTREACH (OUTPATIENT)
Dept: HOME HEALTH SERVICES | Age: 64
End: 2024-01-31
Payer: MEDICARE

## 2024-01-31 NOTE — PROGRESS NOTES
Daily Call Note: Daily call complete.  Call brief as pt was sleeping.  Pt reports she is feeling fine.      Pt Education:   Barriers:   Topics for Daily Review:   Pt demonstrates clear understanding: Yes    Daily Weight:  There were no vitals filed for this visit.   Last 3 Weights:  Wt Readings from Last 7 Encounters:   01/30/24 108 kg (237 lb 6.4 oz)   01/27/24 107 kg (236 lb)   01/26/24 107 kg (235 lb 6.4 oz)   01/23/24 108 kg (239 lb)   01/18/24 110 kg (242 lb 12.8 oz)   01/13/24 109 kg (239 lb 3.2 oz)   01/10/24 109 kg (240 lb)       Masimo Device: No   Masimo Clinical Impression:     Virtual Visits--Scheduled (Most Recent Date at Top)  Follow up Appointments  Recent Visits  Date Type Provider Dept   01/18/24 Office Visit Niall Miranda MD Do [id number] [facility]   Showing recent visits within past 30 days and meeting all other requirements  Future Appointments  Date Type Provider Dept   02/16/24 Appointment Niall Miranda MD Do [id number] [facility]   Showing future appointments within next 90 days and meeting all other requirements       Frequency of RN Calls & Virtual Visits per Team Agreement: Healthy at Home Frequency: Daily    Medication issues Addressed (what was done):     Follow up appointments scheduled by [facility] Staff:     [facility] Transitions Assessment    Patient's Address:   [address] [place] [address]  **  If this is not the address patient will receive services - alert team and address in EMR**       Patient Contacts:  Extended Emergency Contact Information  Primary Emergency Contact: Tolu Ordaz  Address: [address] [place] [address] [place]  Home Phone: [phone]  Mobile Phone: [phone]  Relation: Spouse                                Patient's Preferred Phone: [phone]  Patient's E-mail: [email or web address]

## 2024-02-01 ENCOUNTER — PATIENT OUTREACH (OUTPATIENT)
Dept: HOME HEALTH SERVICES | Age: 64
End: 2024-02-01

## 2024-02-01 NOTE — PROGRESS NOTES
Weekly [facility] call completed with Dr Salmeron from pharmacy patient is feeling good she has increased activity able to walk the dog around the block weight today is 238.1 which is down from last week patient states  her pain is better she had her MRI patient states she is going to try to start swimming now that she is feeling better patient has been making some dietary changes patient has not checked her bp today she states her range is 140/80 average patient has been taking all her medications she has good follow up scheduled patient states she is ready to be off the program we will graduate her from the program today no medication needs questions and concerns addressed encouraged to call if she needs anything     Patient's Address:   [address] [place] [address]  **  If this is not the address patient will receive services - alert team and address in EMR**       Patient Contacts:  Extended Emergency Contact Information  Primary Emergency Contact: Tolu Ordaz  Address: [address] [place] [address] [facility]  Home Phone: [phone]  Mobile Phone: [phone]  Relation: Spouse                                Patient's Preferred Phone: [phone]  Patient's E-mail: [email or web address]

## 2024-02-05 DIAGNOSIS — E03.9 ACQUIRED HYPOTHYROIDISM: ICD-10-CM

## 2024-02-05 RX ORDER — LEVOTHYROXINE SODIUM 125 UG/1
125 TABLET ORAL DAILY
Qty: 30 TABLET | Refills: 0 | Status: SHIPPED | OUTPATIENT
Start: 2024-02-05 | End: 2024-03-01 | Stop reason: DRUGHIGH

## 2024-02-05 NOTE — TELEPHONE ENCOUNTER
Rx Refill Request     Name: Kiara Ordaz  :  1960   Medication Name:  LEVOTHYROXINE 0.125 MG  Specific Pharmacy location:  [facility]  Date of last appointment:  2024   Date of next appointment:  2024   Best number to reach patient:  [phone]

## 2024-02-15 ENCOUNTER — CLINICAL SUPPORT (OUTPATIENT)
Dept: SLEEP MEDICINE | Facility: HOSPITAL | Age: 64
End: 2024-02-15
Payer: MEDICARE

## 2024-02-15 DIAGNOSIS — G47.33 OBSTRUCTIVE SLEEP APNEA (ADULT) (PEDIATRIC): ICD-10-CM

## 2024-02-15 DIAGNOSIS — G47.10 SLEEP APNEA WITH HYPERSOMNOLENCE: ICD-10-CM

## 2024-02-15 DIAGNOSIS — G47.30 SLEEP APNEA WITH HYPERSOMNOLENCE: ICD-10-CM

## 2024-02-15 PROCEDURE — 95811 POLYSOM 6/>YRS CPAP 4/> PARM: CPT | Performed by: INTERNAL MEDICINE

## 2024-02-16 ENCOUNTER — OFFICE VISIT (OUTPATIENT)
Dept: PRIMARY CARE | Facility: CLINIC | Age: 64
End: 2024-02-16
Payer: MEDICARE

## 2024-02-16 VITALS
OXYGEN SATURATION: 96 % | HEIGHT: 64 IN | WEIGHT: 238.1 LBS | HEART RATE: 69 BPM | RESPIRATION RATE: 18 BRPM | BODY MASS INDEX: 40.65 KG/M2

## 2024-02-16 VITALS
SYSTOLIC BLOOD PRESSURE: 130 MMHG | HEART RATE: 56 BPM | WEIGHT: 241 LBS | OXYGEN SATURATION: 96 % | RESPIRATION RATE: 17 BRPM | HEIGHT: 64 IN | DIASTOLIC BLOOD PRESSURE: 80 MMHG | BODY MASS INDEX: 41.15 KG/M2 | TEMPERATURE: 98 F

## 2024-02-16 DIAGNOSIS — E66.01 MORBID OBESITY WITH BMI OF 40.0-44.9, ADULT (MULTI): ICD-10-CM

## 2024-02-16 DIAGNOSIS — M45.9 ANKYLOSING SPONDYLITIS, UNSPECIFIED SITE OF SPINE (MULTI): ICD-10-CM

## 2024-02-16 DIAGNOSIS — E03.9 ACQUIRED HYPOTHYROIDISM: ICD-10-CM

## 2024-02-16 DIAGNOSIS — I47.29 VENTRICULAR TACHYCARDIA, PAROXYSMAL (MULTI): ICD-10-CM

## 2024-02-16 DIAGNOSIS — R53.82 CHRONIC FATIGUE: ICD-10-CM

## 2024-02-16 DIAGNOSIS — I44.2 IDIOVENTRICULAR RHYTHM (MULTI): ICD-10-CM

## 2024-02-16 DIAGNOSIS — K21.9 GASTROESOPHAGEAL REFLUX DISEASE WITHOUT ESOPHAGITIS: ICD-10-CM

## 2024-02-16 DIAGNOSIS — M47.16 LUMBAR SPONDYLOSIS WITH MYELOPATHY: ICD-10-CM

## 2024-02-16 DIAGNOSIS — I10 ESSENTIAL HYPERTENSION: ICD-10-CM

## 2024-02-16 DIAGNOSIS — E78.2 MIXED HYPERLIPIDEMIA: Primary | ICD-10-CM

## 2024-02-16 DIAGNOSIS — M54.16 LUMBAR RADICULOPATHY: ICD-10-CM

## 2024-02-16 PROCEDURE — 1126F AMNT PAIN NOTED NONE PRSNT: CPT | Performed by: FAMILY MEDICINE

## 2024-02-16 PROCEDURE — 3075F SYST BP GE 130 - 139MM HG: CPT | Performed by: FAMILY MEDICINE

## 2024-02-16 PROCEDURE — 1158F ADVNC CARE PLAN TLK DOCD: CPT | Performed by: FAMILY MEDICINE

## 2024-02-16 PROCEDURE — 1160F RVW MEDS BY RX/DR IN RCRD: CPT | Performed by: FAMILY MEDICINE

## 2024-02-16 PROCEDURE — 1159F MED LIST DOCD IN RCRD: CPT | Performed by: FAMILY MEDICINE

## 2024-02-16 PROCEDURE — 3008F BODY MASS INDEX DOCD: CPT | Performed by: FAMILY MEDICINE

## 2024-02-16 PROCEDURE — 1036F TOBACCO NON-USER: CPT | Performed by: FAMILY MEDICINE

## 2024-02-16 PROCEDURE — 1123F ACP DISCUSS/DSCN MKR DOCD: CPT | Performed by: FAMILY MEDICINE

## 2024-02-16 PROCEDURE — 99213 OFFICE O/P EST LOW 20 MIN: CPT | Performed by: FAMILY MEDICINE

## 2024-02-16 PROCEDURE — 3079F DIAST BP 80-89 MM HG: CPT | Performed by: FAMILY MEDICINE

## 2024-02-16 RX ORDER — HYDROCHLOROTHIAZIDE 25 MG/1
25 TABLET ORAL DAILY
Qty: 90 TABLET | Refills: 1 | Status: SHIPPED | OUTPATIENT
Start: 2024-02-16 | End: 2024-05-23 | Stop reason: SDUPTHER

## 2024-02-16 RX ORDER — LISINOPRIL 5 MG/1
5 TABLET ORAL DAILY
Qty: 90 TABLET | Refills: 1 | Status: SHIPPED | OUTPATIENT
Start: 2024-02-16 | End: 2024-05-23 | Stop reason: SDUPTHER

## 2024-02-16 ASSESSMENT — SLEEP AND FATIGUE QUESTIONNAIRES: ESS TOTAL SCORE: 10

## 2024-02-16 ASSESSMENT — ENCOUNTER SYMPTOMS
HEADACHES: 0
WEAKNESS: 0
BACK PAIN: 1
NUMBNESS: 1

## 2024-02-16 ASSESSMENT — PATIENT HEALTH QUESTIONNAIRE - PHQ9
SUM OF ALL RESPONSES TO PHQ9 QUESTIONS 1 AND 2: 0
1. LITTLE INTEREST OR PLEASURE IN DOING THINGS: NOT AT ALL
2. FEELING DOWN, DEPRESSED OR HOPELESS: NOT AT ALL

## 2024-02-16 NOTE — PROGRESS NOTES
[facility] [place] TECH NOTE:     Patient: Kiara Ordaz   MRN//AGE: [id number]  1960  63 y.o.   Technologist: Jane King   Room: 1   Service Date: 2024        Sleep Testing Location: [facility]    [place]: 10    TECHNOLOGIST SLEEP STUDY PROCEDURE NOTE:   This sleep study is being conducted according to the policies and procedures outlined by the AAS accreditation standards.  The sleep study procedure and processes involved during this appointment was explained to the patient/patient’s family, questions were answered. The patient/family verbalized understanding.      The patient is a 63 y.o. year old female scheduled for aDiagnostic PSG Split night with montage of:  PSG . she arrived for her appointment.      The study that was ultimately completed was aCPAP titration with montage of:  PSG .    The full study Was completed.  Patient questionnaires completed?: yes     Consents signed? yes    Initial Fall Risk Screening:     Kiara has fallen in the last 6 months. her did not result in injury. Kiara does not have a fear of falling. He does not need assistance with sitting, standing, or walking. she does not need assistance walking in her home. she does not need assistance in an unfamiliar setting. The patient is notusing an assistive device.     Brief Study observations: Pt is a 63 year old female coming in for split night study. Pt came in alone. No unusual behavior was noted before or during the study. Tech used F&P Shazia FF S/M. Pt may benefit from autopap for comfort due to much higher pressure needed during REM for tolorance.      Deviation to order/protocol and reason: N/A      If PAP, which was preferred mask/pressure/mode: F&P Shazia FF S/M / CPAP/ 12CM H2O    Other:None    After the procedure, the patient/family was informed to ensure followup with ordering clinician for testing results.      Technologist: Jane King

## 2024-02-17 ASSESSMENT — ENCOUNTER SYMPTOMS
SORE THROAT: 0
HEMATURIA: 0
TREMORS: 0
ABDOMINAL PAIN: 0
DIZZINESS: 0
WHEEZING: 0
PALPITATIONS: 0
FEVER: 0
DYSURIA: 0
CHILLS: 0
COUGH: 0
VOMITING: 0
DIARRHEA: 0
SHORTNESS OF BREATH: 0
FREQUENCY: 0
RHINORRHEA: 0
CONSTIPATION: 0

## 2024-02-19 ENCOUNTER — PATIENT OUTREACH (OUTPATIENT)
Dept: PRIMARY CARE | Facility: CLINIC | Age: 64
End: 2024-02-19
Payer: MEDICARE

## 2024-02-29 ENCOUNTER — LAB (OUTPATIENT)
Dept: LAB | Facility: LAB | Age: 64
End: 2024-02-29
Payer: MEDICARE

## 2024-02-29 ENCOUNTER — OFFICE VISIT (OUTPATIENT)
Dept: CARDIOLOGY | Facility: CLINIC | Age: 64
End: 2024-02-29
Payer: MEDICARE

## 2024-02-29 VITALS
HEART RATE: 63 BPM | DIASTOLIC BLOOD PRESSURE: 70 MMHG | SYSTOLIC BLOOD PRESSURE: 122 MMHG | WEIGHT: 238 LBS | BODY MASS INDEX: 40.63 KG/M2 | HEIGHT: 64 IN

## 2024-02-29 DIAGNOSIS — E78.2 MIXED HYPERLIPIDEMIA: ICD-10-CM

## 2024-02-29 DIAGNOSIS — R20.2 NUMBNESS AND TINGLING OF BOTH FEET: ICD-10-CM

## 2024-02-29 DIAGNOSIS — E66.01 MORBID OBESITY WITH BMI OF 40.0-44.9, ADULT (MULTI): ICD-10-CM

## 2024-02-29 DIAGNOSIS — I10 ESSENTIAL HYPERTENSION: ICD-10-CM

## 2024-02-29 DIAGNOSIS — R53.82 CHRONIC FATIGUE: ICD-10-CM

## 2024-02-29 DIAGNOSIS — R20.0 NUMBNESS AND TINGLING OF BOTH FEET: ICD-10-CM

## 2024-02-29 DIAGNOSIS — R07.9 CHEST PAIN, UNSPECIFIED TYPE: ICD-10-CM

## 2024-02-29 DIAGNOSIS — R09.89 BRUIT OF LEFT CAROTID ARTERY: ICD-10-CM

## 2024-02-29 DIAGNOSIS — I47.29 VENTRICULAR TACHYCARDIA, PAROXYSMAL (MULTI): Primary | ICD-10-CM

## 2024-02-29 DIAGNOSIS — E03.9 ACQUIRED HYPOTHYROIDISM: ICD-10-CM

## 2024-02-29 DIAGNOSIS — I25.10 CAD S/P PERCUTANEOUS CORONARY ANGIOPLASTY: ICD-10-CM

## 2024-02-29 DIAGNOSIS — I49.3 PVC (PREMATURE VENTRICULAR CONTRACTION): ICD-10-CM

## 2024-02-29 DIAGNOSIS — I25.2 STATUS POST MYOCARDIAL INFARCTION: ICD-10-CM

## 2024-02-29 DIAGNOSIS — Z98.61 CAD S/P PERCUTANEOUS CORONARY ANGIOPLASTY: ICD-10-CM

## 2024-02-29 PROBLEM — I44.2 IDIOVENTRICULAR RHYTHM (MULTI): Status: RESOLVED | Noted: 2023-08-15 | Resolved: 2024-02-29

## 2024-02-29 LAB
ALBUMIN SERPL BCP-MCNC: 4.4 G/DL (ref 3.4–5)
ALP SERPL-CCNC: 81 U/L (ref 33–136)
ALT SERPL W P-5'-P-CCNC: 14 U/L (ref 7–45)
ANION GAP SERPL CALC-SCNC: 14 MMOL/L (ref 10–20)
AST SERPL W P-5'-P-CCNC: 18 U/L (ref 9–39)
BILIRUB SERPL-MCNC: 0.9 MG/DL (ref 0–1.2)
BUN SERPL-MCNC: 23 MG/DL (ref 6–23)
CALCIUM SERPL-MCNC: 9.8 MG/DL (ref 8.6–10.3)
CHLORIDE SERPL-SCNC: 101 MMOL/L (ref 98–107)
CHOLEST SERPL-MCNC: 170 MG/DL (ref 0–199)
CHOLESTEROL/HDL RATIO: 4
CO2 SERPL-SCNC: 29 MMOL/L (ref 21–32)
CREAT SERPL-MCNC: 1.17 MG/DL (ref 0.5–1.05)
EGFRCR SERPLBLD CKD-EPI 2021: 52 ML/MIN/1.73M*2
FOLATE SERPL-MCNC: >22.3 NG/ML
GLUCOSE SERPL-MCNC: 108 MG/DL (ref 74–99)
HDLC SERPL-MCNC: 42.1 MG/DL
LDLC SERPL CALC-MCNC: 80 MG/DL
NON HDL CHOLESTEROL: 128 MG/DL (ref 0–149)
POTASSIUM SERPL-SCNC: 3.8 MMOL/L (ref 3.5–5.3)
PROT SERPL-MCNC: 7.4 G/DL (ref 6.4–8.2)
SODIUM SERPL-SCNC: 140 MMOL/L (ref 136–145)
T4 FREE SERPL-MCNC: 1.3 NG/DL (ref 0.61–1.12)
TRIGL SERPL-MCNC: 238 MG/DL (ref 0–149)
TSH SERPL-ACNC: 0.01 MIU/L (ref 0.44–3.98)
VIT B12 SERPL-MCNC: 475 PG/ML (ref 211–911)
VLDL: 48 MG/DL (ref 0–40)

## 2024-02-29 PROCEDURE — 1159F MED LIST DOCD IN RCRD: CPT | Performed by: NURSE PRACTITIONER

## 2024-02-29 PROCEDURE — 80053 COMPREHEN METABOLIC PANEL: CPT

## 2024-02-29 PROCEDURE — 3078F DIAST BP <80 MM HG: CPT | Performed by: NURSE PRACTITIONER

## 2024-02-29 PROCEDURE — 84439 ASSAY OF FREE THYROXINE: CPT

## 2024-02-29 PROCEDURE — 82746 ASSAY OF FOLIC ACID SERUM: CPT

## 2024-02-29 PROCEDURE — 1126F AMNT PAIN NOTED NONE PRSNT: CPT | Performed by: NURSE PRACTITIONER

## 2024-02-29 PROCEDURE — 36415 COLL VENOUS BLD VENIPUNCTURE: CPT

## 2024-02-29 PROCEDURE — 1160F RVW MEDS BY RX/DR IN RCRD: CPT | Performed by: NURSE PRACTITIONER

## 2024-02-29 PROCEDURE — 80061 LIPID PANEL: CPT

## 2024-02-29 PROCEDURE — 99213 OFFICE O/P EST LOW 20 MIN: CPT | Performed by: NURSE PRACTITIONER

## 2024-02-29 PROCEDURE — 1123F ACP DISCUSS/DSCN MKR DOCD: CPT | Performed by: NURSE PRACTITIONER

## 2024-02-29 PROCEDURE — 82607 VITAMIN B-12: CPT

## 2024-02-29 PROCEDURE — 84443 ASSAY THYROID STIM HORMONE: CPT

## 2024-02-29 PROCEDURE — 1036F TOBACCO NON-USER: CPT | Performed by: NURSE PRACTITIONER

## 2024-02-29 PROCEDURE — 3008F BODY MASS INDEX DOCD: CPT | Performed by: NURSE PRACTITIONER

## 2024-02-29 PROCEDURE — 3074F SYST BP LT 130 MM HG: CPT | Performed by: NURSE PRACTITIONER

## 2024-02-29 NOTE — PROGRESS NOTES
"CARDIOLOGY OFFICE VISIT      CHIEF COMPLAINT  Chief Complaint   Patient presents with    Rapid Heart Rate     Chief complaint: \"I have been doing well recently.\"  HISTORY OF PRESENT ILLNESS  HPI  History: The patient is a 63-year-old  female who is followed for coronary artery disease status post PTCA and drug-eluting stent to the proximal circumflex coronary artery on June 9, 2017 with normal left ventricular function and Lexiscan stress test dated August 19, 2019 revealing no acute ischemic changes or infarct patterns with normal left ventricular function.  She underwent implantation of a loop recorder on August 23, 2017 for correlation of arrhythmias to symptom and removal of the loop recorder on February 25, 2021.  She has a history of PVCs and idioventricular rhythm controlled on metoprolol.  She denies palpitations, dizziness, lightheadedness, abdominal distention, or lower extremity edema.  She does experience shortness of breath with activity but states that she is deconditioned.  Last year she was focused on her 's health.  He underwent removal of a brain mass which is noncancerous.  She states that he is completely recovered.  She states this year is her turn to focus on her health.  She does experience arthritic pain and is undergoing evaluation for back pain.  Patient states that she does occasionally experience a slight tingling in the left side of her chest when she is under stress.  She denies cardiac chest pain, heaviness achiness, or associated symptoms.  Past Medical History  Past Medical History:   Diagnosis Date    Asthma 1990’s    Coronary artery disease     Depression Not sure    Difficulty walking 2007-08    Benjamín virk 2013    GERD (gastroesophageal reflux disease) Mid 2000’s    Gout 2022    Hammer toe 7-2023    HL (hearing loss) Young    Hyperlipidemia     Hypertension     Ingrown toenail 1990    Sleep apnea        Social History  Social History     Tobacco Use    Smoking " status: Never    Smokeless tobacco: Never   Substance Use Topics    Alcohol use: Yes     Comment: Very rairly    Drug use: Never       Family History     Family History   Problem Relation Name Age of Onset    Hypertension Mother Mother. Gini Long RICKYCallie     Other (coronary bypass) Mother Mother. Gini Long RICKYAldoMiki     Coronary artery disease Mother Mother. Gini Long RICKYSusanaMiki     Arthritis Mother Mother. Gini Long RICKYAldoMiki     Heart disease Mother Mother. Gini Long Fabien     Miscarriages / Stillbirths Mother Mother. Gini Long RICKYSusanaMiki     Lymphoma Father Tomy GARCÍA’Miki III brother     Cancer Father Tomy GARCÍA’Miki III brother     Diabetes Father Tomy GARCÍA’Miki III brother     Stroke Sister Monika Reising     Hypertension Sister Monika Reising     Diabetes type II Sister Monika Reising     Other (pacemaker) Sister Monika Reising     Diabetes Sister Monika Reising     Heart disease Sister Monika Reising     Stroke Brother Lawrence Conn     Coronary artery disease Brother Lawrence Conn     Diabetes type II Brother Lawrence Conn     Hypertension Brother Lawrence Conn     Diabetes Brother Lawrence Conn     Accidental death Sister Radha Monk     Arthritis Sister Cecily Herrera     Diabetes Brother Heri Fabien     Heart disease Brother Heri Fabien     Hypertension Brother Heri Fabien         Allergies:  Allergies   Allergen Reactions    Hydrocodone Other    Iodine Other    Penicillins Hives        Outpatient Medications:  Current Outpatient Medications   Medication Instructions    acetaminophen (TYLENOL) 650 mg, oral, 2 times daily    albuterol 90 mcg/actuation inhaler 2 puffs, inhalation, Every 4 hours PRN    allopurinol (ZYLOPRIM) 300 mg, oral, Daily    aspirin 81 mg, oral, Daily RT    atorvastatin (LIPITOR) 80 mg, oral, Daily    DULoxetine (CYMBALTA) 60 mg, oral, Daily    hydroCHLOROthiazide (HYDRODIURIL) 25 mg, oral, Daily    isosorbide mononitrate ER (IMDUR)  30 mg, oral, Daily    levothyroxine (SYNTHROID, LEVOXYL) 125 mcg, oral, Daily    lisinopril 5 mg, oral, Daily    meloxicam (MOBIC) 15 mg, oral, Daily    metoprolol tartrate (Lopressor) 25 mg tablet Take 2 tablets in am and 1 tablet in pm    multivitamin tablet 1 tablet, oral, Daily    nitroglycerin (NITROSTAT) 0.4 mg, sublingual, As needed    pantoprazole (PROTONIX) 40 mg, oral, Daily    potassium chloride CR 10 mEq ER tablet 10 mEq, oral, 2 times daily          REVIEW OF SYSTEMS  Review of Systems   All other systems reviewed and are negative.        VITALS  Vitals:    02/29/24 1001   BP: 122/70   Pulse: 63       PHYSICAL EXAM  Vitals and nursing note reviewed.   Constitutional:       Appearance: Normal appearance.   HENT:      Head: Normocephalic.   Neck:      Vascular: No JVD.   Cardiovascular:      Rate and Rhythm: Normal rate and regular rhythm.      Pulses: Normal pulses.      Heart sounds: Normal heart sounds.   Pulmonary:      Effort: Pulmonary effort is normal.      Breath sounds: Normal breath sounds.   Abdominal:      General: Bowel sounds are normal.      Palpations: Abdomen is soft.   Musculoskeletal:         General: Normal range of motion.      Cervical back: Normal range of motion.   Skin:     General: Skin is warm and dry.   Neurological:      General: No focal deficit present.      Mental Status: She is alert and oriented to person, place, and time.      Motor: Motor function is intact.   Psychiatric:         Attention and Perception: Attention and perception normal.         Mood and Affect: Mood and affect normal.         Speech: Speech normal.         Behavior: Behavior normal. Behavior is cooperative.         Thought Content: Thought content normal.         Cognition and Memory: Cognition and memory normal.     Labs and testing: Twelve-lead EKG reveals sinus rhythm without ectopics and no acute ischemic changes.  QRS durations 96 ms,  ms, QTc 446 ms.  Sleep study was performed on February  15, 2024 with CPAP titration.      ASSESSMENT AND PLAN    CLINICAL IMPRESSIONS:   1. Coronary artery disease, status post angioplasty with drug-eluting stent to the proximal circumflex on June 9, 2017, with luminal irregularities noted at the RCA and LAD.  2. Nonsustained ventricular tachycardia with frequent premature ventricular contractions noted with abnormal signal-averaged ECG dated July 25, 2017, and negative diagnostic EP study dated August 28, 2017 for inducible arrhythmias.  3. [employer] Reveal LINQ loop recorder implant on August 23, 2017,for identification of arrhythmias with no abnormalities noted status post loop recorder removal on February 25, 2021.  4. Normal left ventricular function per MUGA scan dated July 25,2017, at 58%.  5. Mild left atrial enlargement per 2D echo dated Joy 10, 2017,  6. Trace MR and TR per 2D echo.  7. Anxiety disorder.  8. Hypertension, controlled with a blood pressure today of 122/70.  9. History of orthostatic hypotension.  10. Hypothyroidism, on replacement therapy.  11. Dyslipidemia on statin.  12. Low back pain.  13. Morbid obesity with a BMI of 40.85  14.  Obstructive sleep apnea with recommendation for CPAP awaiting the machine.    Recommendations:  1.  Continue current medications as prescribed.  2.  Follow-up in office with Dr. Byrnes on May 23, 2024 at 10:30 AM schedule or sooner if needed.  3.  Follow-up in office with Dr. Dorantes in 6 months or sooner if needed.  4.  Lifestyle modifications were discussed.  Exercises were reviewed.    Evaluation and note by Krystal Villegas CNP  **Please excuse any errors in grammar or translation related to this dictation.  Voice recognition software was utilized to prepare this document.**

## 2024-03-01 DIAGNOSIS — E03.9 ACQUIRED HYPOTHYROIDISM: ICD-10-CM

## 2024-03-01 RX ORDER — LEVOTHYROXINE SODIUM 100 UG/1
100 TABLET ORAL DAILY
Qty: 30 TABLET | Refills: 3 | Status: SHIPPED | OUTPATIENT
Start: 2024-03-01 | End: 2024-04-16 | Stop reason: SDUPTHER

## 2024-04-15 ENCOUNTER — PATIENT OUTREACH (OUTPATIENT)
Dept: PRIMARY CARE | Facility: CLINIC | Age: 64
End: 2024-04-15
Payer: MEDICARE

## 2024-04-16 DIAGNOSIS — E03.9 ACQUIRED HYPOTHYROIDISM: ICD-10-CM

## 2024-04-16 RX ORDER — LEVOTHYROXINE SODIUM 100 UG/1
100 TABLET ORAL DAILY
Qty: 30 TABLET | Refills: 5 | Status: SHIPPED | OUTPATIENT
Start: 2024-04-16 | End: 2024-05-15 | Stop reason: DRUGHIGH

## 2024-04-16 NOTE — TELEPHONE ENCOUNTER
Rx Refill Request     Name: Kiara Ordaz  :  1960   Medication Name:  LEVOTHYROXINE 100 MCG  Specific Pharmacy location:  [facility]  Date of last appointment:  2024   Date of next appointment:  5/15/2024   Best number to reach patient:  [phone]

## 2024-04-24 ENCOUNTER — OFFICE VISIT (OUTPATIENT)
Dept: PRIMARY CARE | Facility: CLINIC | Age: 64
End: 2024-04-24
Payer: MEDICARE

## 2024-04-24 VITALS
WEIGHT: 237.6 LBS | BODY MASS INDEX: 40.56 KG/M2 | HEIGHT: 64 IN | SYSTOLIC BLOOD PRESSURE: 128 MMHG | DIASTOLIC BLOOD PRESSURE: 68 MMHG | RESPIRATION RATE: 16 BRPM | OXYGEN SATURATION: 96 % | TEMPERATURE: 97.3 F | HEART RATE: 66 BPM

## 2024-04-24 DIAGNOSIS — R06.2 WHEEZING: ICD-10-CM

## 2024-04-24 DIAGNOSIS — R05.1 ACUTE COUGH: ICD-10-CM

## 2024-04-24 DIAGNOSIS — J45.901 ASTHMA WITH ACUTE EXACERBATION, UNSPECIFIED ASTHMA SEVERITY, UNSPECIFIED WHETHER PERSISTENT (HHS-HCC): ICD-10-CM

## 2024-04-24 DIAGNOSIS — E66.01 CLASS 3 SEVERE OBESITY DUE TO EXCESS CALORIES WITH BODY MASS INDEX (BMI) OF 40.0 TO 44.9 IN ADULT, UNSPECIFIED WHETHER SERIOUS COMORBIDITY PRESENT (MULTI): ICD-10-CM

## 2024-04-24 DIAGNOSIS — J40 BRONCHITIS: Primary | ICD-10-CM

## 2024-04-24 PROCEDURE — 99213 OFFICE O/P EST LOW 20 MIN: CPT | Performed by: NURSE PRACTITIONER

## 2024-04-24 RX ORDER — PREDNISONE 10 MG/1
TABLET ORAL DAILY
Qty: 30 TABLET | Refills: 0 | Status: SHIPPED | OUTPATIENT
Start: 2024-04-24 | End: 2024-05-03

## 2024-04-24 RX ORDER — CEFDINIR 300 MG/1
300 CAPSULE ORAL 2 TIMES DAILY
Qty: 20 CAPSULE | Refills: 0 | Status: SHIPPED | OUTPATIENT
Start: 2024-04-24 | End: 2024-05-04

## 2024-04-24 RX ORDER — BENZONATATE 200 MG/1
200 CAPSULE ORAL 3 TIMES DAILY PRN
Qty: 42 CAPSULE | Refills: 0 | Status: SHIPPED | OUTPATIENT
Start: 2024-04-24 | End: 2024-05-24

## 2024-04-24 ASSESSMENT — ENCOUNTER SYMPTOMS
DIZZINESS: 0
OCCASIONAL FEELINGS OF UNSTEADINESS: 0
LOSS OF SENSATION IN FEET: 0
CHILLS: 0
FEVER: 0
SHORTNESS OF BREATH: 1
DIARRHEA: 0
HEADACHES: 0
PALPITATIONS: 0
SORE THROAT: 0
VOMITING: 0
COUGH: 1
SINUS PAIN: 0
WEAKNESS: 0
ABDOMINAL PAIN: 0
NAUSEA: 0
WHEEZING: 1
DEPRESSION: 0
CONSTIPATION: 0

## 2024-04-24 NOTE — PATIENT INSTRUCTIONS
Today you were seen for bronchitis.  Start antibiotics as directed and take until gone.  You have been prescribed benzonatate to take as needed for your cough and a short burst of steroids. Continue with your albuterol inhaler as needed.  Follow-up with your primary care provider in 1 week with any persisting symptoms, or sooner with any additional concerns.  If developing any chest pain, trouble breathing, bluish color around the lips, confusion or lethargy, please go to emergency department for further evaluation or call 911.

## 2024-04-24 NOTE — PROGRESS NOTES
"Subjective   Patient ID: Kiara Ordaz is a 63 y.o. female who presents for URI.      Symptoms:  coughing, sputum yellow green cough so hard diaphragm hurts, dry cough.  Wheezing, Runny nose congestion.  Length of symptoms:  OTC: inhaler with mild help.      HPI   63-year-old female presents today complaining of a cough and nasal congestion that has been worsening over the last 4 days.  She does have a history of asthma, and feels that she has been wheezing.  She denies any fever or chills.  She has been using her albuterol inhaler more.  She denies smoking or vaping.  Her daughter and  were sick with similar symptoms.  She has been using throat lozenges with little relief.      Review of Systems   Constitutional:  Negative for chills and fever.   HENT:  Positive for congestion. Negative for ear pain, sinus pain and sore throat.    Respiratory:  Positive for cough, shortness of breath and wheezing.    Cardiovascular:  Negative for chest pain and palpitations.   Gastrointestinal:  Negative for abdominal pain, constipation, diarrhea, nausea and vomiting.   Skin:  Negative for rash.   Neurological:  Negative for dizziness, weakness and headaches.       Objective   /68 Comment: auto  Pulse 66   Temp 36.3 °C (97.3 °F)   Resp 16   Ht 1.626 m (5' 4\")   Wt 108 kg (237 lb 9.6 oz)   SpO2 96%   BMI 40.78 kg/m²     Physical Exam  Vitals and nursing note reviewed.   Constitutional:       General: She is not in acute distress.     Appearance: Normal appearance. She is obese.   HENT:      Right Ear: Tympanic membrane normal.      Left Ear: Tympanic membrane normal.      Nose: Congestion present.      Mouth/Throat:      Pharynx: No oropharyngeal exudate or posterior oropharyngeal erythema.   Eyes:      Conjunctiva/sclera: Conjunctivae normal.   Cardiovascular:      Rate and Rhythm: Normal rate and regular rhythm.      Heart sounds: Normal heart sounds.   Pulmonary:      Effort: Pulmonary effort is normal.     "  Breath sounds: Wheezing present. No rhonchi or rales.      Comments: Harsh cough noted throughout exam  Lymphadenopathy:      Cervical: No cervical adenopathy.   Skin:     General: Skin is warm and dry.   Neurological:      Mental Status: She is alert.   Psychiatric:         Mood and Affect: Mood normal.         Behavior: Behavior normal.         Assessment/Plan   Problem List Items Addressed This Visit    None  Visit Diagnoses         Codes    Bronchitis    -  Primary J40    Relevant Medications    cefdinir (Omnicef) 300 mg capsule    predniSONE (Deltasone) 10 mg tablet    benzonatate (Tessalon) 200 mg capsule    Asthma with acute exacerbation, unspecified asthma severity, unspecified whether persistent ([facility])     J45.901    Acute cough     R05.1    Wheezing     R06.2    Class 3 severe obesity due to excess calories with body mass index (BMI) of 40.0 to 44.9 in adult, unspecified whether serious comorbidity present ([facility])     E66.01, Z68.41        Bronchitis: Will treat with cefdinir, short burst of prednisone and benzonatate as needed.  Continue albuterol inhaler, patient states that she has plenty at home.  Increase rest and fluids.  Follow-up with PCP in 1 week with any persisting symptoms, or sooner with any additional concerns.  If developing any worsening symptoms, chest pain, trouble breathing, dizziness or lethargy, proceed to emergency department.

## 2024-05-14 ENCOUNTER — LAB (OUTPATIENT)
Dept: LAB | Facility: LAB | Age: 64
End: 2024-05-14
Payer: MEDICARE

## 2024-05-14 DIAGNOSIS — E03.9 ACQUIRED HYPOTHYROIDISM: ICD-10-CM

## 2024-05-14 LAB
T4 FREE SERPL-MCNC: 0.98 NG/DL (ref 0.61–1.12)
TSH SERPL-ACNC: 0.02 MIU/L (ref 0.44–3.98)

## 2024-05-14 PROCEDURE — 84443 ASSAY THYROID STIM HORMONE: CPT

## 2024-05-14 PROCEDURE — 36415 COLL VENOUS BLD VENIPUNCTURE: CPT

## 2024-05-14 PROCEDURE — 84439 ASSAY OF FREE THYROXINE: CPT

## 2024-05-15 ENCOUNTER — OFFICE VISIT (OUTPATIENT)
Dept: PRIMARY CARE | Facility: CLINIC | Age: 64
End: 2024-05-15
Payer: MEDICARE

## 2024-05-15 VITALS
DIASTOLIC BLOOD PRESSURE: 70 MMHG | BODY MASS INDEX: 41.73 KG/M2 | WEIGHT: 244.4 LBS | TEMPERATURE: 97.4 F | RESPIRATION RATE: 16 BRPM | SYSTOLIC BLOOD PRESSURE: 118 MMHG | HEART RATE: 68 BPM | OXYGEN SATURATION: 95 % | HEIGHT: 64 IN

## 2024-05-15 DIAGNOSIS — D17.20 LIPOMA OF FOREARM: ICD-10-CM

## 2024-05-15 DIAGNOSIS — M48.062 SPINAL STENOSIS OF LUMBAR REGION WITH NEUROGENIC CLAUDICATION: ICD-10-CM

## 2024-05-15 DIAGNOSIS — R20.2 NUMBNESS AND TINGLING OF BOTH LOWER EXTREMITIES: ICD-10-CM

## 2024-05-15 DIAGNOSIS — E78.2 MIXED HYPERLIPIDEMIA: ICD-10-CM

## 2024-05-15 DIAGNOSIS — I10 ESSENTIAL HYPERTENSION: Primary | ICD-10-CM

## 2024-05-15 DIAGNOSIS — G60.9 IDIOPATHIC PERIPHERAL NEUROPATHY: ICD-10-CM

## 2024-05-15 DIAGNOSIS — M47.16 LUMBAR SPONDYLOSIS WITH MYELOPATHY: ICD-10-CM

## 2024-05-15 DIAGNOSIS — E03.9 ACQUIRED HYPOTHYROIDISM: ICD-10-CM

## 2024-05-15 DIAGNOSIS — R20.0 NUMBNESS AND TINGLING OF BOTH LOWER EXTREMITIES: ICD-10-CM

## 2024-05-15 DIAGNOSIS — E66.01 MORBID OBESITY WITH BMI OF 40.0-44.9, ADULT (MULTI): ICD-10-CM

## 2024-05-15 DIAGNOSIS — K21.9 GASTROESOPHAGEAL REFLUX DISEASE WITHOUT ESOPHAGITIS: ICD-10-CM

## 2024-05-15 PROCEDURE — 3078F DIAST BP <80 MM HG: CPT | Performed by: FAMILY MEDICINE

## 2024-05-15 PROCEDURE — 1123F ACP DISCUSS/DSCN MKR DOCD: CPT | Performed by: FAMILY MEDICINE

## 2024-05-15 PROCEDURE — 3074F SYST BP LT 130 MM HG: CPT | Performed by: FAMILY MEDICINE

## 2024-05-15 PROCEDURE — 3008F BODY MASS INDEX DOCD: CPT | Performed by: FAMILY MEDICINE

## 2024-05-15 PROCEDURE — 99214 OFFICE O/P EST MOD 30 MIN: CPT | Performed by: FAMILY MEDICINE

## 2024-05-15 PROCEDURE — 1036F TOBACCO NON-USER: CPT | Performed by: FAMILY MEDICINE

## 2024-05-15 PROCEDURE — 1160F RVW MEDS BY RX/DR IN RCRD: CPT | Performed by: FAMILY MEDICINE

## 2024-05-15 PROCEDURE — 1159F MED LIST DOCD IN RCRD: CPT | Performed by: FAMILY MEDICINE

## 2024-05-15 RX ORDER — LEVOTHYROXINE SODIUM 88 UG/1
88 TABLET ORAL DAILY
Qty: 30 TABLET | Refills: 3 | Status: SHIPPED | OUTPATIENT
Start: 2024-05-15

## 2024-05-15 ASSESSMENT — ENCOUNTER SYMPTOMS
DYSURIA: 0
SORE THROAT: 0
FREQUENCY: 0
CONSTIPATION: 0
WEAKNESS: 1
CHILLS: 0
COUGH: 0
TREMORS: 0
TINGLING: 1
DIARRHEA: 0
WHEEZING: 0
RHINORRHEA: 0
DIZZINESS: 0
BACK PAIN: 1
VOMITING: 0
FEVER: 0
HEMATURIA: 0
NUMBNESS: 1
HEADACHES: 0
PALPITATIONS: 0
SHORTNESS OF BREATH: 0
FATIGUE: 1
ABDOMINAL PAIN: 0

## 2024-05-15 NOTE — PATIENT INSTRUCTIONS
BMI was above normal measurement. Current weight: 111 kg (244 lb 6.4 oz)  Weight change since last visit (-) denotes wt loss 6.8 lbs   Weight loss needed to achieve BMI 25: 99.1 Lbs  Weight loss needed to achieve BMI 30: 70 Lbs  Advised to Increase physical activity.

## 2024-05-15 NOTE — PROGRESS NOTES
Subjective   Patient ID: Kiara Ordaz is a 63 y.o. female who presents for Follow-up (Hypertension, Hyperlipidemia, Hypothyroidism, GERD, Lumbar Spondylosis and labs), Mass (Right Forearm), and Foot Numbness.    Patient is here for follow-up on hypertension and hyperlipidemia. She has no chest pain, dyspnea, exertional chest pressure/discomfort, near-syncope, orthopnea, palpitations, paroxysmal nocturnal dyspnea, and syncope. Taking her medication regularly with no side effects.    She presents for follow up of hypothyroidism. Current symptoms: fatigue, sweating and heat intolerance. She has no diarrhea, cold intolerance, nervousness, palpitations, or weight changes.     She complains of swelling on her right forearm. She has no associated pain. She first noticed it in the last 6 months.  It is soft and located over the dorsum of the distal aspect of the right forearm extending to the wrist.    She complains of numbness and tingling in her feet. Symptoms have been gradually worsening.  She reports that the tingling and numbness of the lower extremity has been spreading from the feet proximally.  She had EMG done which was reported as negative.  Symptoms have been worsening.  No history of diabetes or chronic alcohol use.  Her vitamin B12 level was previously checked and was normal.    Back Pain  This is a chronic problem. The current episode started more than 1 year ago. The problem occurs constantly. The problem has been waxing and waning since onset. The pain is present in the lumbar spine. The quality of the pain is described as aching. The pain does not radiate. The pain is at a severity of 0/10. The patient is experiencing no pain. The pain is The same all the time. The symptoms are aggravated by bending, lying down, standing and sitting. Stiffness is present All day. Associated symptoms include numbness, tingling and weakness. Pertinent negatives include no abdominal pain, chest pain, dysuria, fever or  "headaches. Risk factors include menopause, lack of exercise and poor posture. She has tried chiropractic manipulation and muscle relaxant for the symptoms.      Review of Systems   Constitutional:  Positive for fatigue. Negative for chills and fever.   HENT:  Negative for congestion, ear pain, nosebleeds, rhinorrhea and sore throat.    Respiratory:  Negative for cough, shortness of breath and wheezing.    Cardiovascular:  Negative for chest pain, palpitations and leg swelling.   Gastrointestinal:  Negative for abdominal pain, constipation, diarrhea and vomiting.   Genitourinary:  Negative for dysuria, frequency and hematuria.   Musculoskeletal:  Positive for back pain.   Neurological:  Positive for tingling, weakness and numbness. Negative for dizziness, tremors and headaches.       Objective   /70   Pulse 68   Temp 36.3 °C (97.4 °F)   Resp 16   Ht 1.626 m (5' 4\")   Wt 111 kg (244 lb 6.4 oz)   SpO2 95%   BMI 41.95 kg/m²     Physical Exam  Constitutional:       General: She is not in acute distress.     Appearance: Normal appearance.   HENT:      Head: Normocephalic and atraumatic.      Mouth/Throat:      Mouth: Mucous membranes are moist.      Pharynx: Oropharynx is clear. No oropharyngeal exudate or posterior oropharyngeal erythema.   Eyes:      General: No scleral icterus.     Extraocular Movements: Extraocular movements intact.      Pupils: Pupils are equal, round, and reactive to light.   Cardiovascular:      Rate and Rhythm: Normal rate and regular rhythm.      Pulses: Normal pulses.      Heart sounds: No murmur heard.     No friction rub. No gallop.   Pulmonary:      Effort: Pulmonary effort is normal.      Breath sounds: No wheezing, rhonchi or rales.   Skin:     General: Skin is warm.      Coloration: Skin is not jaundiced or pale.      Findings: No erythema or rash.   Neurological:      General: No focal deficit present.      Mental Status: She is alert and oriented to person, place, and time. "      Cranial Nerves: No cranial nerve deficit.      Sensory: No sensory deficit.      Coordination: Coordination normal.      Gait: Gait normal.       Lab on 05/14/2024   Component Date Value Ref Range Status    Thyroid Stimulating Hormone 05/14/2024 0.02 (L)  0.44 - 3.98 mIU/L Final    Thyroxine, Free 05/14/2024 0.98  0.61 - 1.12 ng/dL Final     === 01/24/24 ===    MR LUMBAR SPINE WO CONTRAST    - Impression -  Multilevel degenerative changes of the lumbar spine as described  above, most pronounced at L2-3.    I personally reviewed the images/study and I agree with the findings  as stated by Dr. Gomez.    MACRO:  None    Signed by: Gustabo Robles 1/24/2024 9:41 AM  Dictation workstation:   [id number]       Assessment/Plan   Problem List Items Addressed This Visit       Acquired hypothyroidism     We will decrease the Levothyroxine to 88 mcg daily.         Relevant Medications    levothyroxine (Synthroid, Levoxyl) 88 mcg tablet    Other Relevant Orders    Follow Up In [facility] - PCP - Established    Thyroid Stimulating Hormone    Thyroxine, Free    Essential hypertension - Primary     Well-controlled, continue current medications and management.         Relevant Orders    Follow Up In [facility] - PCP - Established    CBC and Auto Differential    Comprehensive Metabolic Panel    Lipid Panel    Gastroesophageal reflux disease     Well-controlled, continue current medications and management.         Idiopathic peripheral neuropathy     We will Refer her to the Neurologist for further evaluation.         Relevant Orders    Referral to Neurology    Lipoma of forearm     We discussed the nature of this and will have her observe this for now and contact us with changes.         Lumbar spondylosis with myelopathy     Stable, continue current medications and management.         Relevant Orders    Follow Up In [facility] - PCP - Established    Mixed hyperlipidemia     The nature of  cardiac risk has been fully discussed with this patient. Discussed cardiovascular risk analysis and appropriate diet with the need for lifelong measures to reduce the risk. A regular exercise program is recommended to help achieve and maintain normal body weight, fitness and improve lipid balance. Patient education provided. They understand and agree with this course of treatment. They will return with new or worsening symptoms. Patient instructed to remain current with appropriate annual health maintenance.          Relevant Orders    CBC and Auto Differential    Comprehensive Metabolic Panel    Lipid Panel    Morbid obesity with BMI of 40.0-44.9, adult (Multi)     Continue decrease calorie diet and not more than 1500 calorie per day diet and low-fat diet.  Continue with regular exercise program.  We advised exercise at least 5 days a week for at least 45 minutes and also a minimum of 10,000 steps a day.  The detrimental effects of obesity on health were discussed.         Numbness and tingling of both lower extremities     We will Refer her to the Neurologist for further evaluation.         Relevant Orders    Referral to Neurology    Spinal stenosis of lumbar region with neurogenic claudication     We will Refer her to the Neurologist for further evaluation.         Relevant Orders    Referral to Neurology     Scribe Attestation  By signing my name below, ICarl Scribe   attest that this documentation has been prepared under the direction and in the presence of Niall Miranda MD.

## 2024-05-15 NOTE — ASSESSMENT & PLAN NOTE
We discussed the nature of this and will have her observe this for now and contact us with changes.

## 2024-05-18 DIAGNOSIS — I25.10 CAD S/P PERCUTANEOUS CORONARY ANGIOPLASTY: ICD-10-CM

## 2024-05-18 DIAGNOSIS — I10 ESSENTIAL HYPERTENSION: ICD-10-CM

## 2024-05-18 DIAGNOSIS — Z98.61 CAD S/P PERCUTANEOUS CORONARY ANGIOPLASTY: ICD-10-CM

## 2024-05-22 NOTE — TELEPHONE ENCOUNTER
Received request for prescription refills for patient.   Patient follows with Dr. Sunny Byrnes MD [facility]     Request is for Metoprolol  Is patient currently on medication, YES     Last OV 11/21/23  Next OV 5/23/24    Pended for signing and sent to provider

## 2024-05-23 ENCOUNTER — OFFICE VISIT (OUTPATIENT)
Dept: CARDIOLOGY | Facility: CLINIC | Age: 64
End: 2024-05-23
Payer: MEDICARE

## 2024-05-23 VITALS
HEIGHT: 64 IN | WEIGHT: 242.3 LBS | DIASTOLIC BLOOD PRESSURE: 78 MMHG | SYSTOLIC BLOOD PRESSURE: 148 MMHG | BODY MASS INDEX: 41.37 KG/M2 | HEART RATE: 64 BPM

## 2024-05-23 DIAGNOSIS — I49.3 PVC (PREMATURE VENTRICULAR CONTRACTION): ICD-10-CM

## 2024-05-23 DIAGNOSIS — Z78.9 NEVER SMOKED ANY SUBSTANCE: ICD-10-CM

## 2024-05-23 DIAGNOSIS — I10 ESSENTIAL HYPERTENSION: ICD-10-CM

## 2024-05-23 DIAGNOSIS — I25.2 STATUS POST MYOCARDIAL INFARCTION: ICD-10-CM

## 2024-05-23 DIAGNOSIS — E78.2 MIXED HYPERLIPIDEMIA: ICD-10-CM

## 2024-05-23 DIAGNOSIS — E66.01 MORBID OBESITY WITH BMI OF 40.0-44.9, ADULT (MULTI): ICD-10-CM

## 2024-05-23 DIAGNOSIS — Z98.61 CAD S/P PERCUTANEOUS CORONARY ANGIOPLASTY: ICD-10-CM

## 2024-05-23 DIAGNOSIS — G47.33 OBSTRUCTIVE SLEEP APNEA ON CPAP: ICD-10-CM

## 2024-05-23 DIAGNOSIS — I25.10 CAD S/P PERCUTANEOUS CORONARY ANGIOPLASTY: ICD-10-CM

## 2024-05-23 DIAGNOSIS — I47.29 VENTRICULAR TACHYCARDIA, PAROXYSMAL (MULTI): ICD-10-CM

## 2024-05-23 PROCEDURE — 3008F BODY MASS INDEX DOCD: CPT | Performed by: INTERNAL MEDICINE

## 2024-05-23 PROCEDURE — 1123F ACP DISCUSS/DSCN MKR DOCD: CPT | Performed by: INTERNAL MEDICINE

## 2024-05-23 PROCEDURE — 3078F DIAST BP <80 MM HG: CPT | Performed by: INTERNAL MEDICINE

## 2024-05-23 PROCEDURE — 1159F MED LIST DOCD IN RCRD: CPT | Performed by: INTERNAL MEDICINE

## 2024-05-23 PROCEDURE — 1160F RVW MEDS BY RX/DR IN RCRD: CPT | Performed by: INTERNAL MEDICINE

## 2024-05-23 PROCEDURE — 1036F TOBACCO NON-USER: CPT | Performed by: INTERNAL MEDICINE

## 2024-05-23 PROCEDURE — 3077F SYST BP >= 140 MM HG: CPT | Performed by: INTERNAL MEDICINE

## 2024-05-23 PROCEDURE — 99213 OFFICE O/P EST LOW 20 MIN: CPT | Performed by: INTERNAL MEDICINE

## 2024-05-23 RX ORDER — LISINOPRIL 5 MG/1
5 TABLET ORAL DAILY
Qty: 90 TABLET | Refills: 1 | Status: SHIPPED | OUTPATIENT
Start: 2024-05-23

## 2024-05-23 RX ORDER — METOPROLOL TARTRATE 25 MG/1
TABLET, FILM COATED ORAL
Qty: 270 TABLET | Refills: 3 | Status: SHIPPED | OUTPATIENT
Start: 2024-05-23

## 2024-05-23 RX ORDER — NITROGLYCERIN 0.4 MG/1
0.4 TABLET SUBLINGUAL AS NEEDED
Qty: 25 TABLET | Refills: 5 | Status: SHIPPED | OUTPATIENT
Start: 2024-05-23

## 2024-05-23 RX ORDER — MULTIVITAMIN/IRON/FOLIC ACID 18MG-0.4MG
1 TABLET ORAL DAILY
COMMUNITY

## 2024-05-23 RX ORDER — HYDROCHLOROTHIAZIDE 25 MG/1
25 TABLET ORAL DAILY
Qty: 90 TABLET | Refills: 1 | Status: SHIPPED | OUTPATIENT
Start: 2024-05-23

## 2024-05-23 NOTE — PROGRESS NOTES
Referred by Dr. Harper ref. provider found provider found for No chief complaint on file.       History of Present Illness  Kiara Ordaz is a 63 y.o. year old female patient doing well from a cardiac standpoint no complaint no symptoms of chest pain or shortness of breath.  Denies any symptoms of palpitations syncope or presyncope.  Denies any symptoms of palpitations syncope or presyncope.  I discussed with the patient in length we will continue medication will call for any problem and follow-up as scheduled    Past Medical History  Past Medical History:   Diagnosis Date    Asthma (HHS-HCC) 1990’s    Coronary artery disease     Depression Not sure    Difficulty walking 2007-08    Benjamín virk 2013    GERD (gastroesophageal reflux disease) Mid 2000’s    Gout 2022    Hammer toe 7-2023    HL (hearing loss) Young    Hyperlipidemia     Hypertension     Ingrown toenail 1990    Sleep apnea        Social History  Social History     Tobacco Use    Smoking status: Never    Smokeless tobacco: Never   Vaping Use    Vaping status: Not on file   Substance Use Topics    Alcohol use: Yes     Comment: Very rairly    Drug use: Never       Family History     Family History   Problem Relation Name Age of Onset    Hypertension Mother Mother. Gini Conn     Other (coronary bypass) Mother Mother. Gini GARCÍA’Miki     Coronary artery disease Mother Mother. Gini Conn     Arthritis Mother Mother. Gini Conn     Heart disease Mother Mother. Gini Conn     Miscarriages / Stillbirths Mother Mother. Gini Conn     Lymphoma Father Tomy O’Miki III brother     Cancer Father Tomy O’Miki III brother     Diabetes Father Tomy O’Miki III brother     Stroke Sister Monika Reising     Hypertension Sister Monika Reising     Diabetes type II Sister Monika Reising     Other (pacemaker) Sister Monika Reising     Diabetes Sister Monika Reising     Heart disease Sister Monika  Reising     Stroke Brother Lawrence Conn     Coronary artery disease Brother Lawrence Conn     Diabetes type II Brother Lawrence Conn     Hypertension Brother Lawrence Conn     Diabetes Brother Lawrence Conn     Accidental death Sister Radha Monk     Arthritis Sister Cecily Herrera     Diabetes Brother Heri Conn     Heart disease Brother Heri Conn     Hypertension Brother Heri Conn        Review of Systems  As per HPI, all other systems reviewed and negative.    Allergies:  Allergies   Allergen Reactions    Hydrocodone Other    Iodine Other    Penicillins Hives        Outpatient Medications:  Current Outpatient Medications   Medication Instructions    acetaminophen (TYLENOL) 650 mg, oral, 2 times daily    albuterol 90 mcg/actuation inhaler 2 puffs, inhalation, Every 4 hours PRN    allopurinol (ZYLOPRIM) 300 mg, oral, Daily    aspirin 81 mg, oral, Daily RT    atorvastatin (LIPITOR) 80 mg, oral, Daily    benzonatate (TESSALON) 200 mg, oral, 3 times daily PRN, Do not crush or chew.    DULoxetine (CYMBALTA) 60 mg, oral, Daily    hydroCHLOROthiazide (HYDRODIURIL) 25 mg, oral, Daily    isosorbide mononitrate ER (IMDUR) 30 mg, oral, Daily    levothyroxine (SYNTHROID, LEVOXYL) 88 mcg, oral, Daily, Take on an empty stomach at the same time each day, either 30 to 60 minutes prior to breakfast    lisinopril 5 mg, oral, Daily    meloxicam (MOBIC) 15 mg, oral, Daily    metoprolol tartrate (Lopressor) 25 mg tablet Take 2 tablets in am and 1 tablet in pm    multivitamin tablet 1 tablet, oral, Daily    nitroglycerin (NITROSTAT) 0.4 mg, sublingual, As needed    pantoprazole (PROTONIX) 40 mg, oral, Daily    potassium chloride CR 10 mEq ER tablet 10 mEq, oral, 2 times daily         Vitals:  There were no vitals filed for this visit.    Physical Exam:  Physical Exam  Vitals and nursing note reviewed.   Constitutional:       Appearance: Normal appearance.   HENT:      Head: Normocephalic.   Eyes:       Pupils: Pupils are equal, round, and reactive to light.   Cardiovascular:      Rate and Rhythm: Normal rate and regular rhythm.      Pulses: Normal pulses.      Heart sounds: Normal heart sounds.   Pulmonary:      Effort: Pulmonary effort is normal.      Breath sounds: Normal breath sounds.   Musculoskeletal:         General: Normal range of motion.      Cervical back: Normal range of motion.   Skin:     General: Skin is dry.   Neurological:      General: No focal deficit present.      Mental Status: She is alert and oriented to person, place, and time.   Psychiatric:         Behavior: Behavior is cooperative.             Assessment/Plan   Diagnoses and all orders for this visit:  CAD S/P percutaneous coronary angioplasty  Essential hypertension  Mixed hyperlipidemia  Status post myocardial infarction  PVC (premature ventricular contraction)  Ventricular tachycardia, paroxysmal (Multi)  Obstructive sleep apnea on CPAP  Never smoked any substance  Morbid obesity with BMI of 40.0-44.9, adult (Multi)          Sunny Byrnes MD [facility]  Interventional Cardiology   of [facility]     Thank you for allowing me to participate in the care of this patient. Please do not hesitate to contact me with any further questions or concerns.

## 2024-05-23 NOTE — TELEPHONE ENCOUNTER
Rx Refill Request     Name: Kiara Ordaz  :  1960   Medication Name:  hydrochlorothiazide and lisinopril  Specific Pharmacy location:  [facility]   Date of last appointment:  5/15/24  Date of next appointment:  24   Best number to reach patient:  [phone]

## 2024-05-23 NOTE — PATIENT INSTRUCTIONS
Follow up office visit in 9 months    Continue same medications/treatment.  Patient educated on proper medication use.  Patient educated on risk factor modification.  Please bring any lab results from other providers / physicians to your next appointment.    Please bring all medicines, vitamins and herbal supplements with you when you come to the office.    Prescriptions will not be filled unless you are compliant with your follow up appointments or have a follow up  appointment scheduled as per instruction of your physician.  Refills should be requested at the time of  Your visit.    IChasidy LPN, am scribing for and in the presence of  Dr. Sunny Byrnes MD, FACC    
Detail Level: Simple
General Sunscreen Counseling: I recommended a broad spectrum sunscreen with a SPF of 30 or higher.  I explained that SPF 30 sunscreens block approximately 97 percent of the sun's harmful rays.  Sunscreens should be applied at least 15 minutes prior to expected sun exposure and then every 2 hours after that as long as sun exposure continues. If swimming or exercising sunscreen should be reapplied every 45 minutes to an hour after getting wet or sweating.  One ounce, or the equivalent of a shot glass full of sunscreen, is adequate to protect the skin not covered by a bathing suit. I also recommended a lip balm with a sunscreen as well. Sun protective clothing can be used in lieu of sunscreen but must be worn the entire time you are exposed to the sun's rays.

## 2024-06-24 DIAGNOSIS — E03.9 ACQUIRED HYPOTHYROIDISM: ICD-10-CM

## 2024-06-24 RX ORDER — LEVOTHYROXINE SODIUM 88 UG/1
88 TABLET ORAL DAILY
Qty: 30 TABLET | Refills: 0 | Status: SHIPPED | OUTPATIENT
Start: 2024-06-24

## 2024-06-24 NOTE — TELEPHONE ENCOUNTER
Rx Refill Request     Name: Kiara Ordaz  :  1960   Medication Name:  levothyroxine   Specific Pharmacy location:  [facility]   Date of last appointment:  5/15/24  Date of next appointment:  2024  Best number to reach patient:  [phone]

## 2024-07-29 ENCOUNTER — HOSPITAL ENCOUNTER (OUTPATIENT)
Dept: NEUROLOGY | Facility: HOSPITAL | Age: 64
Discharge: HOME | End: 2024-07-29
Payer: MEDICARE

## 2024-07-29 DIAGNOSIS — G56.03 CARPAL TUNNEL SYNDROME, BILATERAL UPPER LIMBS: ICD-10-CM

## 2024-07-29 PROCEDURE — 95910 NRV CNDJ TEST 7-8 STUDIES: CPT

## 2024-08-01 ENCOUNTER — APPOINTMENT (OUTPATIENT)
Dept: RADIOLOGY | Facility: HOSPITAL | Age: 64
End: 2024-08-01
Payer: MEDICARE

## 2024-08-01 ENCOUNTER — HOSPITAL ENCOUNTER (EMERGENCY)
Facility: HOSPITAL | Age: 64
Discharge: HOME | End: 2024-08-01
Attending: EMERGENCY MEDICINE
Payer: MEDICARE

## 2024-08-01 VITALS
TEMPERATURE: 97.7 F | WEIGHT: 231 LBS | HEIGHT: 64 IN | OXYGEN SATURATION: 98 % | SYSTOLIC BLOOD PRESSURE: 169 MMHG | HEART RATE: 65 BPM | BODY MASS INDEX: 39.44 KG/M2 | RESPIRATION RATE: 20 BRPM | DIASTOLIC BLOOD PRESSURE: 77 MMHG
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
## 2024-08-01 DIAGNOSIS — S09.90XA CLOSED HEAD INJURY, INITIAL ENCOUNTER: ICD-10-CM

## 2024-08-01 DIAGNOSIS — S13.4XXA WHIPLASH INJURY TO NECK, INITIAL ENCOUNTER: ICD-10-CM

## 2024-08-01 PROCEDURE — 72125 CT NECK SPINE W/O DYE: CPT | Performed by: RADIOLOGY

## 2024-08-01 PROCEDURE — 99285 EMERGENCY DEPT VISIT HI MDM: CPT | Mod: 25

## 2024-08-01 PROCEDURE — 70450 CT HEAD/BRAIN W/O DYE: CPT | Performed by: RADIOLOGY

## 2024-08-01 PROCEDURE — 96374 THER/PROPH/DIAG INJ IV PUSH: CPT

## 2024-08-01 PROCEDURE — 71045 X-RAY EXAM CHEST 1 VIEW: CPT

## 2024-08-01 PROCEDURE — 70450 CT HEAD/BRAIN W/O DYE: CPT

## 2024-08-01 PROCEDURE — 96375 TX/PRO/DX INJ NEW DRUG ADDON: CPT

## 2024-08-01 PROCEDURE — 2500000004 HC RX 250 GENERAL PHARMACY W/ HCPCS (ALT 636 FOR OP/ED): Performed by: EMERGENCY MEDICINE

## 2024-08-01 PROCEDURE — 72125 CT NECK SPINE W/O DYE: CPT

## 2024-08-01 PROCEDURE — 71045 X-RAY EXAM CHEST 1 VIEW: CPT | Performed by: RADIOLOGY

## 2024-08-01 RX ORDER — ONDANSETRON HYDROCHLORIDE 2 MG/ML
4 INJECTION, SOLUTION INTRAVENOUS ONCE
Status: COMPLETED | OUTPATIENT
Start: 2024-08-01 | End: 2024-08-01

## 2024-08-01 RX ORDER — MORPHINE SULFATE 4 MG/ML
4 INJECTION, SOLUTION INTRAMUSCULAR; INTRAVENOUS ONCE
Status: COMPLETED | OUTPATIENT
Start: 2024-08-01 | End: 2024-08-01

## 2024-08-01 RX ADMIN — MORPHINE SULFATE 4 MG: 4 INJECTION, SOLUTION INTRAMUSCULAR; INTRAVENOUS at 19:23

## 2024-08-01 RX ADMIN — ONDANSETRON 4 MG: 2 INJECTION INTRAMUSCULAR; INTRAVENOUS at 19:23

## 2024-08-01 ASSESSMENT — COLUMBIA-SUICIDE SEVERITY RATING SCALE - C-SSRS
1. IN THE PAST MONTH, HAVE YOU WISHED YOU WERE DEAD OR WISHED YOU COULD GO TO SLEEP AND NOT WAKE UP?: NO
2. HAVE YOU ACTUALLY HAD ANY THOUGHTS OF KILLING YOURSELF?: NO
6. HAVE YOU EVER DONE ANYTHING, STARTED TO DO ANYTHING, OR PREPARED TO DO ANYTHING TO END YOUR LIFE?: NO

## 2024-08-01 ASSESSMENT — PAIN DESCRIPTION - PAIN TYPE: TYPE: ACUTE PAIN

## 2024-08-01 ASSESSMENT — PAIN SCALES - GENERAL
PAINLEVEL_OUTOF10: 3
PAINLEVEL_OUTOF10: 0 - NO PAIN
PAINLEVEL_OUTOF10: 3
PAINLEVEL_OUTOF10: 0 - NO PAIN

## 2024-08-01 ASSESSMENT — PAIN DESCRIPTION - DESCRIPTORS: DESCRIPTORS: ACHING

## 2024-08-01 ASSESSMENT — PAIN DESCRIPTION - PROGRESSION: CLINICAL_PROGRESSION: NOT CHANGED

## 2024-08-01 ASSESSMENT — PAIN DESCRIPTION - LOCATION
LOCATION: NECK
LOCATION: NECK

## 2024-08-01 ASSESSMENT — PAIN - FUNCTIONAL ASSESSMENT
PAIN_FUNCTIONAL_ASSESSMENT: 0-10
PAIN_FUNCTIONAL_ASSESSMENT: 0-10

## 2024-08-01 ASSESSMENT — PAIN DESCRIPTION - FREQUENCY: FREQUENCY: CONSTANT/CONTINUOUS

## 2024-08-01 ASSESSMENT — PAIN DESCRIPTION - ONSET: ONSET: SUDDEN

## 2024-08-01 NOTE — DISCHARGE INSTRUCTIONS
Follow-up with a primary care doctor.  Return to the emergency department if symptoms worsen or change.  You can take Tylenol regularly as scheduled (1000mg every 8 hours as needed for pain) as directed for further treatment of your symptoms.
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
## 2024-08-19 ENCOUNTER — LAB (OUTPATIENT)
Dept: LAB | Facility: LAB | Age: 64
End: 2024-08-19
Payer: MEDICARE

## 2024-08-19 DIAGNOSIS — E78.2 MIXED HYPERLIPIDEMIA: ICD-10-CM

## 2024-08-19 DIAGNOSIS — E03.9 ACQUIRED HYPOTHYROIDISM: ICD-10-CM

## 2024-08-19 DIAGNOSIS — I10 ESSENTIAL HYPERTENSION: ICD-10-CM

## 2024-08-19 LAB
ALBUMIN SERPL BCP-MCNC: 4.2 G/DL (ref 3.4–5)
ALP SERPL-CCNC: 64 U/L (ref 33–136)
ALT SERPL W P-5'-P-CCNC: 12 U/L (ref 7–45)
ANION GAP SERPL CALC-SCNC: 12 MMOL/L (ref 10–20)
AST SERPL W P-5'-P-CCNC: 14 U/L (ref 9–39)
BASOPHILS # BLD AUTO: 0.06 X10*3/UL (ref 0–0.1)
BASOPHILS NFR BLD AUTO: 1 %
BILIRUB SERPL-MCNC: 0.7 MG/DL (ref 0–1.2)
BUN SERPL-MCNC: 20 MG/DL (ref 6–23)
CALCIUM SERPL-MCNC: 9.5 MG/DL (ref 8.6–10.3)
CHLORIDE SERPL-SCNC: 103 MMOL/L (ref 98–107)
CHOLEST SERPL-MCNC: 149 MG/DL (ref 0–199)
CHOLESTEROL/HDL RATIO: 3.9
CO2 SERPL-SCNC: 30 MMOL/L (ref 21–32)
CREAT SERPL-MCNC: 1.04 MG/DL (ref 0.5–1.05)
EGFRCR SERPLBLD CKD-EPI 2021: 60 ML/MIN/1.73M*2
EOSINOPHIL # BLD AUTO: 0.17 X10*3/UL (ref 0–0.7)
EOSINOPHIL NFR BLD AUTO: 3 %
ERYTHROCYTE [DISTWIDTH] IN BLOOD BY AUTOMATED COUNT: 13.7 % (ref 11.5–14.5)
GLUCOSE SERPL-MCNC: 95 MG/DL (ref 74–99)
HCT VFR BLD AUTO: 35.8 % (ref 36–46)
HDLC SERPL-MCNC: 37.9 MG/DL
HGB BLD-MCNC: 11.4 G/DL (ref 12–16)
IMM GRANULOCYTES # BLD AUTO: 0.01 X10*3/UL (ref 0–0.7)
IMM GRANULOCYTES NFR BLD AUTO: 0.2 % (ref 0–0.9)
LDLC SERPL CALC-MCNC: 50 MG/DL
LYMPHOCYTES # BLD AUTO: 2.01 X10*3/UL (ref 1.2–4.8)
LYMPHOCYTES NFR BLD AUTO: 35 %
MCH RBC QN AUTO: 31.1 PG (ref 26–34)
MCHC RBC AUTO-ENTMCNC: 31.8 G/DL (ref 32–36)
MCV RBC AUTO: 98 FL (ref 80–100)
MONOCYTES # BLD AUTO: 0.45 X10*3/UL (ref 0.1–1)
MONOCYTES NFR BLD AUTO: 7.8 %
NEUTROPHILS # BLD AUTO: 3.05 X10*3/UL (ref 1.2–7.7)
NEUTROPHILS NFR BLD AUTO: 53 %
NON HDL CHOLESTEROL: 111 MG/DL (ref 0–149)
NRBC BLD-RTO: 0 /100 WBCS (ref 0–0)
PLATELET # BLD AUTO: 226 X10*3/UL (ref 150–450)
POTASSIUM SERPL-SCNC: 3.9 MMOL/L (ref 3.5–5.3)
PROT SERPL-MCNC: 6.9 G/DL (ref 6.4–8.2)
RBC # BLD AUTO: 3.66 X10*6/UL (ref 4–5.2)
SODIUM SERPL-SCNC: 141 MMOL/L (ref 136–145)
T4 FREE SERPL-MCNC: 0.89 NG/DL (ref 0.61–1.12)
TRIGL SERPL-MCNC: 305 MG/DL (ref 0–149)
TSH SERPL-ACNC: 0.02 MIU/L (ref 0.44–3.98)
VLDL: 61 MG/DL (ref 0–40)
WBC # BLD AUTO: 5.8 X10*3/UL (ref 4.4–11.3)

## 2024-08-19 PROCEDURE — 80053 COMPREHEN METABOLIC PANEL: CPT

## 2024-08-19 PROCEDURE — 80061 LIPID PANEL: CPT

## 2024-08-19 PROCEDURE — 85025 COMPLETE CBC W/AUTO DIFF WBC: CPT

## 2024-08-19 PROCEDURE — 84443 ASSAY THYROID STIM HORMONE: CPT

## 2024-08-19 PROCEDURE — 84439 ASSAY OF FREE THYROXINE: CPT

## 2024-08-22 ENCOUNTER — APPOINTMENT (OUTPATIENT)
Dept: PRIMARY CARE | Facility: CLINIC | Age: 64
End: 2024-08-22
Payer: MEDICARE

## 2024-08-22 VITALS
BODY MASS INDEX: 41.15 KG/M2 | DIASTOLIC BLOOD PRESSURE: 70 MMHG | HEART RATE: 57 BPM | RESPIRATION RATE: 16 BRPM | SYSTOLIC BLOOD PRESSURE: 108 MMHG | TEMPERATURE: 96.6 F | HEIGHT: 64 IN | WEIGHT: 241 LBS | OXYGEN SATURATION: 98 %
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
## 2024-08-22 DIAGNOSIS — I10 ESSENTIAL HYPERTENSION: Primary | ICD-10-CM

## 2024-08-22 DIAGNOSIS — E66.01 MORBID OBESITY WITH BMI OF 40.0-44.9, ADULT (MULTI): ICD-10-CM

## 2024-08-22 DIAGNOSIS — E78.2 MIXED HYPERLIPIDEMIA: ICD-10-CM

## 2024-08-22 DIAGNOSIS — D64.9 ANEMIA, UNSPECIFIED TYPE: ICD-10-CM

## 2024-08-22 DIAGNOSIS — M47.16 LUMBAR SPONDYLOSIS WITH MYELOPATHY: ICD-10-CM

## 2024-08-22 DIAGNOSIS — E03.9 ACQUIRED HYPOTHYROIDISM: ICD-10-CM

## 2024-08-22 PROCEDURE — 99214 OFFICE O/P EST MOD 30 MIN: CPT | Performed by: FAMILY MEDICINE

## 2024-08-22 PROCEDURE — 1123F ACP DISCUSS/DSCN MKR DOCD: CPT | Performed by: FAMILY MEDICINE

## 2024-08-22 PROCEDURE — 1036F TOBACCO NON-USER: CPT | Performed by: FAMILY MEDICINE

## 2024-08-22 PROCEDURE — 3078F DIAST BP <80 MM HG: CPT | Performed by: FAMILY MEDICINE

## 2024-08-22 PROCEDURE — 1160F RVW MEDS BY RX/DR IN RCRD: CPT | Performed by: FAMILY MEDICINE

## 2024-08-22 PROCEDURE — 1159F MED LIST DOCD IN RCRD: CPT | Performed by: FAMILY MEDICINE

## 2024-08-22 PROCEDURE — 3074F SYST BP LT 130 MM HG: CPT | Performed by: FAMILY MEDICINE

## 2024-08-22 PROCEDURE — 1158F ADVNC CARE PLAN TLK DOCD: CPT | Performed by: FAMILY MEDICINE

## 2024-08-22 PROCEDURE — 3008F BODY MASS INDEX DOCD: CPT | Performed by: FAMILY MEDICINE

## 2024-08-22 RX ORDER — LEVOTHYROXINE SODIUM 75 UG/1
75 TABLET ORAL DAILY
Qty: 90 TABLET | Refills: 0 | Status: SHIPPED | OUTPATIENT
Start: 2024-08-22

## 2024-08-22 ASSESSMENT — ENCOUNTER SYMPTOMS
CONSTIPATION: 0
PALPITATIONS: 0
WHEEZING: 0
TREMORS: 0
DEPRESSION: 0
VOMITING: 0
COUGH: 0
DIZZINESS: 0
HEADACHES: 0
NUMBNESS: 0
ABDOMINAL PAIN: 0
RHINORRHEA: 0
SORE THROAT: 0
CHILLS: 0
OCCASIONAL FEELINGS OF UNSTEADINESS: 0
HEMATURIA: 0
LOSS OF SENSATION IN FEET: 0
DIARRHEA: 0
FEVER: 0
FREQUENCY: 0
SHORTNESS OF BREATH: 0
DYSURIA: 0

## 2024-08-22 NOTE — PATIENT INSTRUCTIONS
BMI was above normal measurement. Current weight: 109 kg (241 lb)  Weight change since last visit (-) denotes wt loss 10 lbs   Weight loss needed to achieve BMI 25: 95.7 Lbs  Weight loss needed to achieve BMI 30: 66.6 Lbs  Advised to Increase physical activity.
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
## 2024-09-03 ENCOUNTER — APPOINTMENT (OUTPATIENT)
Dept: CARDIOLOGY | Facility: CLINIC | Age: 64
End: 2024-09-03
Payer: MEDICARE

## 2024-09-03 VITALS
SYSTOLIC BLOOD PRESSURE: 140 MMHG | BODY MASS INDEX: 41.2 KG/M2 | HEART RATE: 59 BPM | DIASTOLIC BLOOD PRESSURE: 72 MMHG | WEIGHT: 240 LBS

## 2024-09-03 DIAGNOSIS — E78.2 MIXED HYPERLIPIDEMIA: ICD-10-CM

## 2024-09-03 DIAGNOSIS — G47.33 OBSTRUCTIVE SLEEP APNEA ON CPAP: ICD-10-CM

## 2024-09-03 DIAGNOSIS — I47.29 VENTRICULAR TACHYCARDIA, PAROXYSMAL (MULTI): Primary | ICD-10-CM

## 2024-09-03 DIAGNOSIS — E66.01 MORBID OBESITY WITH BMI OF 40.0-44.9, ADULT (MULTI): ICD-10-CM

## 2024-09-03 DIAGNOSIS — I25.10 CAD S/P PERCUTANEOUS CORONARY ANGIOPLASTY: ICD-10-CM

## 2024-09-03 DIAGNOSIS — I49.3 PVC (PREMATURE VENTRICULAR CONTRACTION): ICD-10-CM

## 2024-09-03 DIAGNOSIS — Z71.89 ENCOUNTER FOR MEDICATION REVIEW AND COUNSELING: ICD-10-CM

## 2024-09-03 DIAGNOSIS — Z98.61 CAD S/P PERCUTANEOUS CORONARY ANGIOPLASTY: ICD-10-CM

## 2024-09-03 DIAGNOSIS — I10 ESSENTIAL HYPERTENSION: ICD-10-CM

## 2024-09-03 DIAGNOSIS — Z71.89 ENCOUNTER TO DISCUSS TREATMENT OPTIONS: ICD-10-CM

## 2024-09-03 DIAGNOSIS — Z78.9 NEVER SMOKED ANY SUBSTANCE: ICD-10-CM

## 2024-09-03 PROCEDURE — 99213 OFFICE O/P EST LOW 20 MIN: CPT | Performed by: INTERNAL MEDICINE

## 2024-09-03 PROCEDURE — 1036F TOBACCO NON-USER: CPT | Performed by: INTERNAL MEDICINE

## 2024-09-03 PROCEDURE — 1123F ACP DISCUSS/DSCN MKR DOCD: CPT | Performed by: INTERNAL MEDICINE

## 2024-09-03 PROCEDURE — 3078F DIAST BP <80 MM HG: CPT | Performed by: INTERNAL MEDICINE

## 2024-09-03 PROCEDURE — 93000 ELECTROCARDIOGRAM COMPLETE: CPT | Performed by: INTERNAL MEDICINE

## 2024-09-03 PROCEDURE — 1159F MED LIST DOCD IN RCRD: CPT | Performed by: INTERNAL MEDICINE

## 2024-09-03 PROCEDURE — 3077F SYST BP >= 140 MM HG: CPT | Performed by: INTERNAL MEDICINE

## 2024-09-03 RX ORDER — NITROGLYCERIN 0.4 MG/1
0.4 TABLET SUBLINGUAL AS NEEDED
Qty: 25 TABLET | Refills: 5 | Status: SHIPPED | OUTPATIENT
Start: 2024-09-03

## 2024-09-03 ASSESSMENT — ENCOUNTER SYMPTOMS
DYSPNEA ON EXERTION: 0
DIZZINESS: 1
PALPITATIONS: 0
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
## 2024-09-03 NOTE — PATIENT INSTRUCTIONS
Continue same medications/treatment.  Patient educated on proper medication use.  Patient educated on risk factor modification.  Please bring any lab results from other providers/physicians to your next appointment.    Please bring all medicines, vitamins, and herbal supplements with you when you come to the office.    Prescriptions will not be filled unless you are compliant with your follow up appointments or have a follow up appointment scheduled as per instruction of your physician. Refills should be requested at the time of your visit.    Follow up with Dr. Dorantes in 6 months    KARLA HERNADEZ RN, AM SCRIBING FOR AND IN THE PRESENCE OF DR. AP DORANTES MD, FACC, FACP, RS

## 2024-11-19 ENCOUNTER — LAB (OUTPATIENT)
Dept: LAB | Facility: LAB | Age: 64
End: 2024-11-19
Payer: COMMERCIAL

## 2024-11-19 DIAGNOSIS — I10 ESSENTIAL HYPERTENSION: ICD-10-CM

## 2024-11-19 DIAGNOSIS — D64.9 ANEMIA, UNSPECIFIED TYPE: ICD-10-CM

## 2024-11-19 DIAGNOSIS — E03.9 ACQUIRED HYPOTHYROIDISM: ICD-10-CM

## 2024-11-19 LAB
ALBUMIN SERPL BCP-MCNC: 4.4 G/DL (ref 3.4–5)
ALP SERPL-CCNC: 78 U/L (ref 33–136)
ALT SERPL W P-5'-P-CCNC: 15 U/L (ref 7–45)
ANION GAP SERPL CALC-SCNC: 13 MMOL/L (ref 10–20)
AST SERPL W P-5'-P-CCNC: 17 U/L (ref 9–39)
BASOPHILS # BLD AUTO: 0.07 X10*3/UL (ref 0–0.1)
BASOPHILS NFR BLD AUTO: 1.1 %
BILIRUB SERPL-MCNC: 1 MG/DL (ref 0–1.2)
BUN SERPL-MCNC: 27 MG/DL (ref 6–23)
CALCIUM SERPL-MCNC: 9.2 MG/DL (ref 8.6–10.3)
CHLORIDE SERPL-SCNC: 104 MMOL/L (ref 98–107)
CHOLEST SERPL-MCNC: 159 MG/DL (ref 0–199)
CHOLESTEROL/HDL RATIO: 3.8
CO2 SERPL-SCNC: 26 MMOL/L (ref 21–32)
CREAT SERPL-MCNC: 1.13 MG/DL (ref 0.5–1.05)
EGFRCR SERPLBLD CKD-EPI 2021: 54 ML/MIN/1.73M*2
EOSINOPHIL # BLD AUTO: 0.21 X10*3/UL (ref 0–0.7)
EOSINOPHIL NFR BLD AUTO: 3.3 %
ERYTHROCYTE [DISTWIDTH] IN BLOOD BY AUTOMATED COUNT: 13.4 % (ref 11.5–14.5)
FERRITIN SERPL-MCNC: 189 NG/ML (ref 8–150)
GLUCOSE SERPL-MCNC: 113 MG/DL (ref 74–99)
HCT VFR BLD AUTO: 40.5 % (ref 36–46)
HDLC SERPL-MCNC: 42.2 MG/DL
HGB BLD-MCNC: 13.2 G/DL (ref 12–16)
IMM GRANULOCYTES # BLD AUTO: 0.02 X10*3/UL (ref 0–0.7)
IMM GRANULOCYTES NFR BLD AUTO: 0.3 % (ref 0–0.9)
IRON SATN MFR SERPL: 25 % (ref 25–45)
IRON SERPL-MCNC: 72 UG/DL (ref 35–150)
LDLC SERPL CALC-MCNC: 74 MG/DL
LYMPHOCYTES # BLD AUTO: 1.81 X10*3/UL (ref 1.2–4.8)
LYMPHOCYTES NFR BLD AUTO: 28.2 %
MCH RBC QN AUTO: 30.9 PG (ref 26–34)
MCHC RBC AUTO-ENTMCNC: 32.6 G/DL (ref 32–36)
MCV RBC AUTO: 95 FL (ref 80–100)
MONOCYTES # BLD AUTO: 0.44 X10*3/UL (ref 0.1–1)
MONOCYTES NFR BLD AUTO: 6.9 %
NEUTROPHILS # BLD AUTO: 3.87 X10*3/UL (ref 1.2–7.7)
NEUTROPHILS NFR BLD AUTO: 60.2 %
NON HDL CHOLESTEROL: 117 MG/DL (ref 0–149)
NRBC BLD-RTO: 0 /100 WBCS (ref 0–0)
PLATELET # BLD AUTO: 262 X10*3/UL (ref 150–450)
POTASSIUM SERPL-SCNC: 4.2 MMOL/L (ref 3.5–5.3)
PROT SERPL-MCNC: 7.1 G/DL (ref 6.4–8.2)
RBC # BLD AUTO: 4.27 X10*6/UL (ref 4–5.2)
SODIUM SERPL-SCNC: 139 MMOL/L (ref 136–145)
T4 FREE SERPL-MCNC: 0.9 NG/DL (ref 0.61–1.12)
TIBC SERPL-MCNC: 291 UG/DL (ref 240–445)
TRIGL SERPL-MCNC: 214 MG/DL (ref 0–149)
TSH SERPL-ACNC: 0.05 MIU/L (ref 0.44–3.98)
UIBC SERPL-MCNC: 219 UG/DL (ref 110–370)
VLDL: 43 MG/DL (ref 0–40)
WBC # BLD AUTO: 6.4 X10*3/UL (ref 4.4–11.3)

## 2024-11-19 PROCEDURE — 82728 ASSAY OF FERRITIN: CPT

## 2024-11-19 PROCEDURE — 83540 ASSAY OF IRON: CPT

## 2024-11-19 PROCEDURE — 84443 ASSAY THYROID STIM HORMONE: CPT

## 2024-11-19 PROCEDURE — 83550 IRON BINDING TEST: CPT

## 2024-11-19 PROCEDURE — 80053 COMPREHEN METABOLIC PANEL: CPT

## 2024-11-19 PROCEDURE — 80061 LIPID PANEL: CPT

## 2024-11-19 PROCEDURE — 84439 ASSAY OF FREE THYROXINE: CPT

## 2024-11-19 PROCEDURE — 85025 COMPLETE CBC W/AUTO DIFF WBC: CPT

## 2024-11-21 ENCOUNTER — APPOINTMENT (OUTPATIENT)
Dept: PRIMARY CARE | Facility: CLINIC | Age: 64
End: 2024-11-21
Payer: MEDICARE

## 2024-11-21 VITALS
RESPIRATION RATE: 14 BRPM | HEIGHT: 64 IN | SYSTOLIC BLOOD PRESSURE: 124 MMHG | OXYGEN SATURATION: 98 % | DIASTOLIC BLOOD PRESSURE: 72 MMHG | BODY MASS INDEX: 41.48 KG/M2 | HEART RATE: 64 BPM | WEIGHT: 243 LBS | TEMPERATURE: 97.6 F

## 2024-11-21 DIAGNOSIS — Z00.00 ROUTINE GENERAL MEDICAL EXAMINATION AT A HEALTH CARE FACILITY: Primary | ICD-10-CM

## 2024-11-21 DIAGNOSIS — Z23 NEED FOR INFLUENZA VACCINATION: ICD-10-CM

## 2024-11-21 DIAGNOSIS — M47.16 LUMBAR SPONDYLOSIS WITH MYELOPATHY: ICD-10-CM

## 2024-11-21 DIAGNOSIS — H81.13 BENIGN PAROXYSMAL POSITIONAL VERTIGO DUE TO BILATERAL VESTIBULAR DISORDER: ICD-10-CM

## 2024-11-21 DIAGNOSIS — E03.9 ACQUIRED HYPOTHYROIDISM: ICD-10-CM

## 2024-11-21 DIAGNOSIS — J45.20 MILD INTERMITTENT REACTIVE AIRWAY DISEASE WITHOUT COMPLICATION (HHS-HCC): ICD-10-CM

## 2024-11-21 DIAGNOSIS — K21.9 GASTROESOPHAGEAL REFLUX DISEASE WITHOUT ESOPHAGITIS: ICD-10-CM

## 2024-11-21 DIAGNOSIS — M1A.00X0 CHRONIC IDIOPATHIC GOUT: ICD-10-CM

## 2024-11-21 DIAGNOSIS — I10 ESSENTIAL HYPERTENSION: ICD-10-CM

## 2024-11-21 DIAGNOSIS — E78.2 MIXED HYPERLIPIDEMIA: ICD-10-CM

## 2024-11-21 DIAGNOSIS — G60.9 IDIOPATHIC PERIPHERAL NEUROPATHY: ICD-10-CM

## 2024-11-21 DIAGNOSIS — Z12.31 ENCOUNTER FOR SCREENING MAMMOGRAM FOR BREAST CANCER: ICD-10-CM

## 2024-11-21 DIAGNOSIS — E66.01 MORBID OBESITY WITH BMI OF 40.0-44.9, ADULT (MULTI): ICD-10-CM

## 2024-11-21 PROBLEM — J45.909 REACTIVE AIRWAY DISEASE WITHOUT COMPLICATION (HHS-HCC): Status: ACTIVE | Noted: 2024-11-21

## 2024-11-21 RX ORDER — NEOMYCIN SULFATE, POLYMYXIN B SULFATE AND DEXAMETHASONE 3.5; 10000; 1 MG/ML; [USP'U]/ML; MG/ML
SUSPENSION/ DROPS OPHTHALMIC
COMMUNITY
Start: 2024-03-19

## 2024-11-21 RX ORDER — LEVOTHYROXINE SODIUM 50 UG/1
50 TABLET ORAL DAILY
Qty: 90 TABLET | Refills: 0 | Status: SHIPPED | OUTPATIENT
Start: 2024-11-21

## 2024-11-21 RX ORDER — ALBUTEROL SULFATE 90 UG/1
2 INHALANT RESPIRATORY (INHALATION) EVERY 4 HOURS PRN
Qty: 18 G | Refills: 2 | Status: SHIPPED | OUTPATIENT
Start: 2024-11-21

## 2024-11-21 RX ORDER — ALLOPURINOL 300 MG/1
300 TABLET ORAL DAILY
Qty: 90 TABLET | Refills: 1 | Status: SHIPPED | OUTPATIENT
Start: 2024-11-21

## 2024-11-21 RX ORDER — LISINOPRIL 5 MG/1
5 TABLET ORAL DAILY
Qty: 90 TABLET | Refills: 1 | Status: SHIPPED | OUTPATIENT
Start: 2024-11-21

## 2024-11-21 RX ORDER — LEVOTHYROXINE SODIUM 75 UG/1
75 TABLET ORAL DAILY
Qty: 90 TABLET | Refills: 0 | Status: CANCELLED | OUTPATIENT
Start: 2024-11-21

## 2024-11-21 RX ORDER — MELOXICAM 15 MG/1
15 TABLET ORAL DAILY
Qty: 90 TABLET | Refills: 1 | Status: SHIPPED | OUTPATIENT
Start: 2024-11-21

## 2024-11-21 RX ORDER — ATORVASTATIN CALCIUM 80 MG/1
80 TABLET, FILM COATED ORAL NIGHTLY
Qty: 90 TABLET | Refills: 1 | Status: SHIPPED | OUTPATIENT
Start: 2024-11-21

## 2024-11-21 RX ORDER — DULOXETIN HYDROCHLORIDE 60 MG/1
60 CAPSULE, DELAYED RELEASE ORAL DAILY
Qty: 90 CAPSULE | Refills: 1 | Status: SHIPPED | OUTPATIENT
Start: 2024-11-21

## 2024-11-21 RX ORDER — HYDROCHLOROTHIAZIDE 25 MG/1
25 TABLET ORAL DAILY
Qty: 90 TABLET | Refills: 1 | Status: SHIPPED | OUTPATIENT
Start: 2024-11-21

## 2024-11-21 RX ORDER — PANTOPRAZOLE SODIUM 40 MG/1
40 TABLET, DELAYED RELEASE ORAL DAILY
Qty: 90 TABLET | Refills: 1 | Status: SHIPPED | OUTPATIENT
Start: 2024-11-21

## 2024-11-21 ASSESSMENT — ACTIVITIES OF DAILY LIVING (ADL)
MANAGING_FINANCES: INDEPENDENT
GROCERY_SHOPPING: INDEPENDENT
DOING_HOUSEWORK: INDEPENDENT
BATHING: INDEPENDENT
DRESSING: INDEPENDENT
TAKING_MEDICATION: INDEPENDENT

## 2024-11-21 ASSESSMENT — ENCOUNTER SYMPTOMS
SPEECH DIFFICULTY: 0
SHORTNESS OF BREATH: 0
CONSTIPATION: 0
HEADACHES: 0
HEMATURIA: 0
FEVER: 0
DYSURIA: 0
ABDOMINAL PAIN: 0
FREQUENCY: 0
PALPITATIONS: 0
WEAKNESS: 0
COUGH: 0
DIZZINESS: 1
BACK PAIN: 1
CHILLS: 0
TREMORS: 0
DIARRHEA: 0
RHINORRHEA: 0
VOMITING: 0
NECK PAIN: 1
SORE THROAT: 0
WHEEZING: 0
NUMBNESS: 1

## 2024-11-21 NOTE — PATIENT INSTRUCTIONS
BMI was above normal measurement. Current weight: 110 kg (243 lb)  Weight change since last visit (-) denotes wt loss 3 lbs   Weight loss needed to achieve BMI 25: 97.7 Lbs  Weight loss needed to achieve BMI 30: 68.6 Lbs  Advised to Increase physical activity.

## 2024-11-21 NOTE — PROGRESS NOTES
Subjective   Patient ID: Kiara Ordaz is a 64 y.o. female who presents for Medicare Annual Wellness Visit Subsequent, Follow-up (Hypertension, Hyperlipidemia, Hypothyroidism and labs), and Dizziness.    She presents for Annual Medicare Wellness Visit and follow-up on hypertension and hyperlipidemia. She has no chest pain, dyspnea, exertional chest pressure/discomfort, near-syncope, orthopnea, paroxysmal nocturnal dyspnea, and syncope. Taking her medication regularly with no side effects.    She presents for follow up of hypothyroidism. Current symptoms: heat intolerance. She has no change in energy level, diarrhea, nervousness, or weight changes.     She complains of dizziness. She states symptoms are intermittent and occur when standing after rising and also with position changes.  The dizziness is in form of vertigo and is positional.  She denies associated nausea no vomiting.  No earache, no tinnitus and no hearing impairment.  She has no blurred vision or double vision with the dizziness.  This usually is for few minutes.    She presents to follow up on peripheral neuropathy. Current symptoms include tingling in her feet. Symptoms have been unchanged.  She has been able to adapt to the symptoms and does not need medication to treat this.       Past Medical, Surgical, and Family History reviewed and updated in chart.    Reviewed all medications by prescribing practitioner or clinical pharmacist (such as prescriptions, OTCs, herbal therapies and supplements) and documented in the medical record.    Patient Self Assessment of Health Status  Patient Self Assessment: Fair    Nutrition and Exercise  Current Diet: Unhealthy Diet  Adequate Fluid Intake: Yes  Caffeine: Yes  Exercise Frequency: Infrequently    Functional Ability/Level of Safety  Cognitive Impairment Observed: No cognitive impairment observed  Cognitive Impairment Reported: No cognitive impairment reported by patient or family    Home Safety Risk  "Factors: None    Review of Systems   Constitutional:  Negative for chills and fever.   HENT:  Negative for congestion, ear pain, nosebleeds, rhinorrhea and sore throat.    Respiratory:  Negative for cough, shortness of breath and wheezing.    Cardiovascular:  Negative for chest pain, palpitations and leg swelling.   Gastrointestinal:  Negative for abdominal pain, constipation, diarrhea and vomiting.   Genitourinary:  Negative for dysuria, frequency and hematuria.   Musculoskeletal:  Positive for back pain and neck pain.   Skin:  Negative for pallor and rash.   Neurological:  Positive for dizziness and numbness. Negative for tremors, speech difficulty, weakness and headaches.       Objective   /72   Pulse 64   Temp 36.4 °C (97.6 °F)   Resp 14   Ht 1.626 m (5' 4\")   Wt 110 kg (243 lb)   SpO2 98%   BMI 41.71 kg/m²     Physical Exam  Constitutional:       General: She is not in acute distress.     Appearance: Normal appearance.   HENT:      Head: Normocephalic and atraumatic.      Mouth/Throat:      Mouth: Mucous membranes are moist.      Pharynx: Oropharynx is clear. No oropharyngeal exudate or posterior oropharyngeal erythema.   Eyes:      General: No scleral icterus.     Extraocular Movements: Extraocular movements intact.      Pupils: Pupils are equal, round, and reactive to light.   Cardiovascular:      Rate and Rhythm: Normal rate and regular rhythm.      Pulses: Normal pulses.      Heart sounds: No murmur heard.     No friction rub. No gallop.   Pulmonary:      Effort: Pulmonary effort is normal.      Breath sounds: No wheezing, rhonchi or rales.   Musculoskeletal:      Right lower leg: No edema.      Left lower leg: No edema.   Skin:     General: Skin is warm.      Coloration: Skin is not jaundiced or pale.      Findings: No erythema or rash.   Neurological:      General: No focal deficit present.      Mental Status: She is alert and oriented to person, place, and time.      Cranial Nerves: No " cranial nerve deficit.      Sensory: No sensory deficit.      Motor: No weakness.      Coordination: Coordination normal.      Gait: Gait normal.      Deep Tendon Reflexes: Reflexes normal.         Lab on 11/19/2024   Component Date Value Ref Range Status    WBC 11/19/2024 6.4  4.4 - 11.3 x10*3/uL Final    nRBC 11/19/2024 0.0  0.0 - 0.0 /100 WBCs Final    RBC 11/19/2024 4.27  4.00 - 5.20 x10*6/uL Final    Hemoglobin 11/19/2024 13.2  12.0 - 16.0 g/dL Final    Hematocrit 11/19/2024 40.5  36.0 - 46.0 % Final    MCV 11/19/2024 95  80 - 100 fL Final    MCH 11/19/2024 30.9  26.0 - 34.0 pg Final    MCHC 11/19/2024 32.6  32.0 - 36.0 g/dL Final    RDW 11/19/2024 13.4  11.5 - 14.5 % Final    Platelets 11/19/2024 262  150 - 450 x10*3/uL Final    Neutrophils % 11/19/2024 60.2  40.0 - 80.0 % Final    Immature Granulocytes %, Automated 11/19/2024 0.3  0.0 - 0.9 % Final    Immature Granulocyte Count (IG) includes promyelocytes, myelocytes and metamyelocytes but does not include bands. Percent differential counts (%) should be interpreted in the context of the absolute cell counts (cells/UL).    Lymphocytes % 11/19/2024 28.2  13.0 - 44.0 % Final    Monocytes % 11/19/2024 6.9  2.0 - 10.0 % Final    Eosinophils % 11/19/2024 3.3  0.0 - 6.0 % Final    Basophils % 11/19/2024 1.1  0.0 - 2.0 % Final    Neutrophils Absolute 11/19/2024 3.87  1.20 - 7.70 x10*3/uL Final    Percent differential counts (%) should be interpreted in the context of the absolute cell counts (cells/uL).    Immature Granulocytes Absolute, Au* 11/19/2024 0.02  0.00 - 0.70 x10*3/uL Final    Lymphocytes Absolute 11/19/2024 1.81  1.20 - 4.80 x10*3/uL Final    Monocytes Absolute 11/19/2024 0.44  0.10 - 1.00 x10*3/uL Final    Eosinophils Absolute 11/19/2024 0.21  0.00 - 0.70 x10*3/uL Final    Basophils Absolute 11/19/2024 0.07  0.00 - 0.10 x10*3/uL Final    Glucose 11/19/2024 113 (H)  74 - 99 mg/dL Final    Sodium 11/19/2024 139  136 - 145 mmol/L Final    Potassium  11/19/2024 4.2  3.5 - 5.3 mmol/L Final    Chloride 11/19/2024 104  98 - 107 mmol/L Final    Bicarbonate 11/19/2024 26  21 - 32 mmol/L Final    Anion Gap 11/19/2024 13  10 - 20 mmol/L Final    Urea Nitrogen 11/19/2024 27 (H)  6 - 23 mg/dL Final    Creatinine 11/19/2024 1.13 (H)  0.50 - 1.05 mg/dL Final    eGFR 11/19/2024 54 (L)  >60 mL/min/1.73m*2 Final    Calculations of estimated GFR are performed using the 2021 CKD-EPI Study Refit equation without the race variable for the IDMS-Traceable creatinine methods.  https://jasn.asnjournals.org/content/early/2021/09/22/ASN.2272408813    Calcium 11/19/2024 9.2  8.6 - 10.3 mg/dL Final    Albumin 11/19/2024 4.4  3.4 - 5.0 g/dL Final    Alkaline Phosphatase 11/19/2024 78  33 - 136 U/L Final    Total Protein 11/19/2024 7.1  6.4 - 8.2 g/dL Final    AST 11/19/2024 17  9 - 39 U/L Final    Bilirubin, Total 11/19/2024 1.0  0.0 - 1.2 mg/dL Final    ALT 11/19/2024 15  7 - 45 U/L Final    Patients treated with Sulfasalazine may generate falsely decreased results for ALT.    Cholesterol 11/19/2024 159  0 - 199 mg/dL Final          Age      Desirable   Borderline High   High     0-19 Y     0 - 169       170 - 199     >/= 200    20-24 Y     0 - 189       190 - 224     >/= 225         >24 Y     0 - 199       200 - 239     >/= 240   **All ranges are based on fasting samples. Specific   therapeutic targets will vary based on patient-specific   cardiac risk.    Pediatric guidelines reference:Pediatrics 2011, 128(S5).Adult guidelines reference: NCEP ATPIII Guidelines,PATRICK 2001, 258:2486-97    Venipuncture immediately after or during the administration of Metamizole may lead to falsely low results. Testing should be performed immediately prior to Metamizole dosing.    HDL-Cholesterol 11/19/2024 42.2  mg/dL Final      Age       Very Low   Low     Normal    High    0-19 Y    < 35      < 40     40-45     ----  20-24 Y    ----     < 40      >45      ----        >24 Y      ----     < 40     40-60       >60      Cholesterol/HDL Ratio 11/19/2024 3.8   Final      Ref Values  Desirable  < 3.4  High Risk  > 5.0    LDL Calculated 11/19/2024 74  <=99 mg/dL Final                                Near   Borderline      AGE      Desirable  Optimal    High     High     Very High     0-19 Y     0 - 109     ---    110-129   >/= 130     ----    20-24 Y     0 - 119     ---    120-159   >/= 160     ----      >24 Y     0 -  99   100-129  130-159   160-189     >/=190      VLDL 11/19/2024 43 (H)  0 - 40 mg/dL Final    Triglycerides 11/19/2024 214 (H)  0 - 149 mg/dL Final    Age              Desirable        Borderline         High        Very High  SEX:B           mg/dL             mg/dL               mg/dL      mg/dL  <=14D                       ----               ----        ----  15D-365D                    ----               ----        ----  1Y-9Y           0-74               75-99             >=100       ----  10Y-19Y        0-89                            >=130       ----  20Y-24Y        0-114             115-149             >=150      ----  >= 25Y         0-149             150-199             200-499    >=500      Venipuncture immediately after or during the administration of Metamizole may lead to falsely low results. Testing should be performed immediately prior to Metamizole dosing.    Non HDL Cholesterol 11/19/2024 117  0 - 149 mg/dL Final          Age       Desirable   Borderline High   High     Very High     0-19 Y     0 - 119       120 - 144     >/= 145    >/= 160    20-24 Y     0 - 149       150 - 189     >/= 190      ----         >24 Y    30 mg/dL above LDL Cholesterol goal      Thyroid Stimulating Hormone 11/19/2024 0.05 (L)  0.44 - 3.98 mIU/L Final    Thyroxine, Free 11/19/2024 0.90  0.61 - 1.12 ng/dL Final    Iron 11/19/2024 72  35 - 150 ug/dL Final    UIBC 11/19/2024 219  110 - 370 ug/dL Final    TIBC 11/19/2024 291  240 - 445 ug/dL Final    % Saturation 11/19/2024 25  25 - 45 % Final     "Ferritin 11/19/2024 189 (H)  8 - 150 ng/mL Final     Assessment/Plan   Problem List Items Addressed This Visit       Acquired hypothyroidism     We will decrease the Levothyroxine to 50 mcg daily.          Relevant Medications    levothyroxine (Synthroid, Levoxyl) 50 mcg tablet    Other Relevant Orders    Thyroid Stimulating Hormone    Thyroxine, Free    Follow Up In [facility] - PCP - Established    Benign paroxysmal positional vertigo due to bilateral vestibular disorder     The nature of vertigo syndromes were discussed along with their usual course and treatment.  Educational materials given and questions answered.  The risks and benefits of my recommendations, as well as other treatment options were discussed with the patient today.          Chronic idiopathic gout     Well-controlled, continue current medications and management.         Relevant Medications    allopurinol (Zyloprim) 300 mg tablet    Essential hypertension     Well-controlled, continue current medications and management.  Dietary sodium restriction.  Regular aerobic exercise program is recommended to help achieve and maintain normal body weight, fitness and improve lipid balance. .  Dietary changes: Increase soluble fiber  Plant sterols 2grams per day (e.g. Benecol)  Reduce saturated fat, \"trans\" monounsaturated fatty acids, and cholesterol         Relevant Medications    hydroCHLOROthiazide (HYDRODiuril) 25 mg tablet    lisinopril 5 mg tablet    Other Relevant Orders    Comprehensive Metabolic Panel    Lipid Panel    Follow Up In [facility] - PCP - Established    Gastroesophageal reflux disease     Well-controlled, continue current medications and management.         Relevant Medications    pantoprazole (ProtoNix) 40 mg EC tablet    Idiopathic peripheral neuropathy     Stable, continue current medications and management.         Relevant Medications    DULoxetine (Cymbalta) 60 mg DR capsule    Lumbar spondylosis with " myelopathy     Stable, continue current medications and management.         Relevant Medications    meloxicam (Mobic) 15 mg tablet    Mixed hyperlipidemia     The nature of cardiac risk has been fully discussed with this patient. Discussed cardiovascular risk analysis and appropriate diet with the need for lifelong measures to reduce the risk. A regular exercise program is recommended to help achieve and maintain normal body weight, fitness and improve lipid balance. Patient education provided. They understand and agree with this course of treatment. They will return with new or worsening symptoms. Patient instructed to remain current with appropriate annual health maintenance.          Relevant Medications    atorvastatin (Lipitor) 80 mg tablet    Other Relevant Orders    Comprehensive Metabolic Panel    Lipid Panel    Follow Up In [facility] - PCP - Established    Morbid obesity with BMI of 40.0-44.9, adult (Multi)     Continue decrease calorie diet and not more than 1500 calorie per day diet and low-fat diet.  Continue with regular exercise program.  We advised exercise at least 5 days a week for at least 45 minutes and also a minimum of 10,000 steps a day.  The detrimental effects of obesity on health were discussed.         Reactive airway disease without complication (HHS-HCC)     Well-controlled, continue current medications and management.         Relevant Medications    albuterol 90 mcg/actuation inhaler    Routine general medical examination at a health care facility - Primary     Recommend low-cholesterol diet, low-fat diet and low-salt diet.  The need for lifelong dietary compliance in order to reduce cardiac risk is recommended.  We will also recommend regular exercise program to improve lipid balance and overall health.  Recommend decreasing fat and cholesterol in diet, increasing aerobic exercise with a goal of 4 or more days per week          Other Visit Diagnoses       Need for influenza  vaccination        Relevant Orders    Flu vaccine, trivalent, preservative free, HIGH-DOSE, age 65y+ (Fluzone) (Completed)    Encounter for screening mammogram for breast cancer        Relevant Orders    BI mammo bilateral screening tomosynthesis          Scribe Attestation  By signing my name below, I, Becka Oh   attest that this documentation has been prepared under the direction and in the presence of Niall Miranda MD.

## 2024-11-21 NOTE — ASSESSMENT & PLAN NOTE
The nature of vertigo syndromes were discussed along with their usual course and treatment.  Educational materials given and questions answered.  The risks and benefits of my recommendations, as well as other treatment options were discussed with the patient today.

## 2024-12-20 ENCOUNTER — HOSPITAL ENCOUNTER (OUTPATIENT)
Dept: RADIOLOGY | Facility: HOSPITAL | Age: 64
Discharge: HOME | End: 2024-12-20
Payer: MEDICARE

## 2024-12-20 VITALS — WEIGHT: 236 LBS | HEIGHT: 64 IN | BODY MASS INDEX: 40.29 KG/M2

## 2024-12-20 DIAGNOSIS — Z12.31 ENCOUNTER FOR SCREENING MAMMOGRAM FOR BREAST CANCER: ICD-10-CM

## 2024-12-20 DIAGNOSIS — R92.341 EXTREMELY DENSE TISSUE OF RIGHT BREAST ON MAMMOGRAPHY: ICD-10-CM

## 2024-12-20 PROCEDURE — 77063 BREAST TOMOSYNTHESIS BI: CPT | Performed by: RADIOLOGY

## 2024-12-20 PROCEDURE — 77067 SCR MAMMO BI INCL CAD: CPT | Performed by: RADIOLOGY

## 2024-12-20 PROCEDURE — 77063 BREAST TOMOSYNTHESIS BI: CPT

## 2025-01-22 ENCOUNTER — HOSPITAL ENCOUNTER (OUTPATIENT)
Dept: RADIOLOGY | Facility: HOSPITAL | Age: 65
Discharge: HOME | End: 2025-01-22
Payer: MEDICARE

## 2025-01-22 VITALS — WEIGHT: 240 LBS | BODY MASS INDEX: 40.97 KG/M2

## 2025-01-22 DIAGNOSIS — R92.2 INCONCLUSIVE MAMMOGRAM: ICD-10-CM

## 2025-01-22 DIAGNOSIS — R92.341 EXTREMELY DENSE TISSUE OF RIGHT BREAST ON MAMMOGRAPHY: ICD-10-CM

## 2025-01-22 PROCEDURE — 77065 DX MAMMO INCL CAD UNI: CPT | Mod: RT

## 2025-01-22 PROCEDURE — 77065 DX MAMMO INCL CAD UNI: CPT | Mod: RIGHT SIDE | Performed by: RADIOLOGY

## 2025-01-22 PROCEDURE — 76642 ULTRASOUND BREAST LIMITED: CPT | Mod: RT

## 2025-01-22 PROCEDURE — 76642 ULTRASOUND BREAST LIMITED: CPT | Mod: RIGHT SIDE | Performed by: RADIOLOGY

## 2025-01-22 PROCEDURE — G0279 TOMOSYNTHESIS, MAMMO: HCPCS | Mod: RIGHT SIDE | Performed by: RADIOLOGY

## 2025-01-23 DIAGNOSIS — Z12.31 ENCOUNTER FOR SCREENING MAMMOGRAM FOR BREAST CANCER: Primary | ICD-10-CM

## 2025-02-04 LAB
ALBUMIN SERPL-MCNC: 4.5 G/DL (ref 3.6–5.1)
ALP SERPL-CCNC: 72 U/L (ref 37–153)
ALT SERPL-CCNC: 13 U/L (ref 6–29)
ANION GAP SERPL CALCULATED.4IONS-SCNC: 10 MMOL/L (CALC) (ref 7–17)
AST SERPL-CCNC: 17 U/L (ref 10–35)
BILIRUB SERPL-MCNC: 0.9 MG/DL (ref 0.2–1.2)
BUN SERPL-MCNC: 18 MG/DL (ref 7–25)
CALCIUM SERPL-MCNC: 9.6 MG/DL (ref 8.6–10.4)
CHLORIDE SERPL-SCNC: 101 MMOL/L (ref 98–110)
CHOLEST SERPL-MCNC: 158 MG/DL
CHOLEST/HDLC SERPL: 4 (CALC)
CO2 SERPL-SCNC: 28 MMOL/L (ref 20–32)
CREAT SERPL-MCNC: 1.06 MG/DL (ref 0.5–1.05)
EGFRCR SERPLBLD CKD-EPI 2021: 58 ML/MIN/1.73M2
GLUCOSE SERPL-MCNC: 115 MG/DL (ref 65–99)
HDLC SERPL-MCNC: 40 MG/DL
LDLC SERPL CALC-MCNC: 81 MG/DL (CALC)
NONHDLC SERPL-MCNC: 118 MG/DL (CALC)
POTASSIUM SERPL-SCNC: 4.3 MMOL/L (ref 3.5–5.3)
PROT SERPL-MCNC: 7.5 G/DL (ref 6.1–8.1)
SODIUM SERPL-SCNC: 139 MMOL/L (ref 135–146)
T4 FREE SERPL-MCNC: 1.1 NG/DL (ref 0.8–1.8)
TRIGL SERPL-MCNC: 304 MG/DL
TSH SERPL-ACNC: 1.22 MIU/L (ref 0.4–4.5)

## 2025-02-05 ENCOUNTER — OFFICE VISIT (OUTPATIENT)
Dept: PRIMARY CARE | Facility: CLINIC | Age: 65
End: 2025-02-05
Payer: MEDICARE

## 2025-02-05 VITALS
TEMPERATURE: 96.6 F | HEIGHT: 64 IN | BODY MASS INDEX: 41.76 KG/M2 | HEART RATE: 56 BPM | WEIGHT: 244.6 LBS | OXYGEN SATURATION: 98 % | SYSTOLIC BLOOD PRESSURE: 118 MMHG | RESPIRATION RATE: 16 BRPM | DIASTOLIC BLOOD PRESSURE: 80 MMHG

## 2025-02-05 DIAGNOSIS — R10.9 LEFT FLANK PAIN: ICD-10-CM

## 2025-02-05 DIAGNOSIS — I10 ESSENTIAL HYPERTENSION: Primary | ICD-10-CM

## 2025-02-05 DIAGNOSIS — I47.29 VENTRICULAR TACHYCARDIA, PAROXYSMAL (MULTI): ICD-10-CM

## 2025-02-05 DIAGNOSIS — N23 RENAL COLIC: ICD-10-CM

## 2025-02-05 DIAGNOSIS — E66.01 MORBID OBESITY WITH BMI OF 40.0-44.9, ADULT (MULTI): ICD-10-CM

## 2025-02-05 DIAGNOSIS — M45.9 ANKYLOSING SPONDYLITIS, UNSPECIFIED SITE OF SPINE (MULTI): ICD-10-CM

## 2025-02-05 DIAGNOSIS — E03.9 ACQUIRED HYPOTHYROIDISM: ICD-10-CM

## 2025-02-05 DIAGNOSIS — J45.20 MILD INTERMITTENT REACTIVE AIRWAY DISEASE WITHOUT COMPLICATION (HHS-HCC): ICD-10-CM

## 2025-02-05 DIAGNOSIS — E78.2 MIXED HYPERLIPIDEMIA: ICD-10-CM

## 2025-02-05 LAB
POC APPEARANCE, URINE: CLEAR
POC BILIRUBIN, URINE: NEGATIVE
POC BLOOD, URINE: NEGATIVE
POC COLOR, URINE: ABNORMAL
POC GLUCOSE, URINE: NEGATIVE MG/DL
POC KETONES, URINE: NEGATIVE MG/DL
POC LEUKOCYTES, URINE: ABNORMAL
POC NITRITE,URINE: NEGATIVE
POC PH, URINE: 6 PH
POC PROTEIN, URINE: NEGATIVE MG/DL
POC SPECIFIC GRAVITY, URINE: 1.02
POC UROBILINOGEN, URINE: 0.2 EU/DL

## 2025-02-05 PROCEDURE — 1160F RVW MEDS BY RX/DR IN RCRD: CPT | Performed by: FAMILY MEDICINE

## 2025-02-05 PROCEDURE — 1036F TOBACCO NON-USER: CPT | Performed by: FAMILY MEDICINE

## 2025-02-05 PROCEDURE — 1123F ACP DISCUSS/DSCN MKR DOCD: CPT | Performed by: FAMILY MEDICINE

## 2025-02-05 PROCEDURE — 99214 OFFICE O/P EST MOD 30 MIN: CPT | Performed by: FAMILY MEDICINE

## 2025-02-05 PROCEDURE — G2211 COMPLEX E/M VISIT ADD ON: HCPCS | Performed by: FAMILY MEDICINE

## 2025-02-05 PROCEDURE — 3079F DIAST BP 80-89 MM HG: CPT | Performed by: FAMILY MEDICINE

## 2025-02-05 PROCEDURE — 1159F MED LIST DOCD IN RCRD: CPT | Performed by: FAMILY MEDICINE

## 2025-02-05 PROCEDURE — 3008F BODY MASS INDEX DOCD: CPT | Performed by: FAMILY MEDICINE

## 2025-02-05 PROCEDURE — 3074F SYST BP LT 130 MM HG: CPT | Performed by: FAMILY MEDICINE

## 2025-02-05 PROCEDURE — 81003 URINALYSIS AUTO W/O SCOPE: CPT | Performed by: FAMILY MEDICINE

## 2025-02-05 RX ORDER — LEVOTHYROXINE SODIUM 50 UG/1
50 TABLET ORAL DAILY
Qty: 90 TABLET | Refills: 1 | Status: SHIPPED | OUTPATIENT
Start: 2025-02-05

## 2025-02-05 ASSESSMENT — ENCOUNTER SYMPTOMS
DYSURIA: 0
FEVER: 0
NUMBNESS: 0
TREMORS: 0
DIARRHEA: 0
CHILLS: 0
SORE THROAT: 0
FLANK PAIN: 1
BACK PAIN: 1
PALPITATIONS: 0
RHINORRHEA: 0
ABDOMINAL PAIN: 1
DIZZINESS: 0
CONSTIPATION: 0
WEAKNESS: 0
HEADACHES: 0
VOMITING: 0
SHORTNESS OF BREATH: 0
COUGH: 0
WHEEZING: 0
NAUSEA: 1
FREQUENCY: 0
HEMATURIA: 0

## 2025-02-05 ASSESSMENT — ANXIETY QUESTIONNAIRES
2. NOT BEING ABLE TO STOP OR CONTROL WORRYING: NOT AT ALL
1. FEELING NERVOUS, ANXIOUS, OR ON EDGE: NOT AT ALL
GAD7 TOTAL SCORE: 0
6. BECOMING EASILY ANNOYED OR IRRITABLE: NOT AT ALL
5. BEING SO RESTLESS THAT IT IS HARD TO SIT STILL: NOT AT ALL
7. FEELING AFRAID AS IF SOMETHING AWFUL MIGHT HAPPEN: NOT AT ALL
3. WORRYING TOO MUCH ABOUT DIFFERENT THINGS: NOT AT ALL
4. TROUBLE RELAXING: NOT AT ALL
IF YOU CHECKED OFF ANY PROBLEMS ON THIS QUESTIONNAIRE, HOW DIFFICULT HAVE THESE PROBLEMS MADE IT FOR YOU TO DO YOUR WORK, TAKE CARE OF THINGS AT HOME, OR GET ALONG WITH OTHER PEOPLE: NOT DIFFICULT AT ALL

## 2025-02-05 ASSESSMENT — PATIENT HEALTH QUESTIONNAIRE - PHQ9
1. LITTLE INTEREST OR PLEASURE IN DOING THINGS: NOT AT ALL
SUM OF ALL RESPONSES TO PHQ9 QUESTIONS 1 AND 2: 0
2. FEELING DOWN, DEPRESSED OR HOPELESS: NOT AT ALL

## 2025-02-05 NOTE — PATIENT INSTRUCTIONS
BMI was above normal measurement. Current weight: 111 kg (244 lb 9.6 oz)  Weight change since last visit (-) denotes wt loss 4.6 lbs   Weight loss needed to achieve BMI 25: 99.3 Lbs  Weight loss needed to achieve BMI 30: 70.2 Lbs  Advised to Increase physical activity.

## 2025-02-05 NOTE — PROGRESS NOTES
Subjective   Patient ID: Kiara Ordaz is a 64 y.o. female who presents for Follow-up (Hypertension, Hyperlipidemia, Hypothyroidism and labs) and Flank Pain.    Patient is here for follow-up on hypertension and hyperlipidemia. She has no chest pain, dyspnea, exertional chest pressure/discomfort, near-syncope, orthopnea, palpitations, paroxysmal nocturnal dyspnea, and syncope. Taking her medication regularly with no side effects.    She presents for follow up of hypothyroidism. Current symptoms: none. She has no change in energy level, diarrhea, heat / cold intolerance, nervousness, palpitations, or weight changes.     Flank Pain  This is a recurrent problem. The current episode started more than 1 month ago. The problem occurs constantly. The problem is unchanged. Pain location: left flank. The quality of the pain is described as stabbing and cramping. Radiates to: LUQ and LLQ. The pain is at a severity of 6/10. The pain is moderate. The pain is The same all the time. Exacerbated by: certain positions and movement. Associated symptoms include abdominal pain. Pertinent negatives include no chest pain, dysuria, fever, headaches, numbness or weakness. (nausea) Treatments tried: acetaminophen. The treatment provided no relief.        Review of Systems   Constitutional:  Negative for chills and fever.   HENT:  Negative for congestion, ear pain, nosebleeds, rhinorrhea and sore throat.    Respiratory:  Negative for cough, shortness of breath and wheezing.    Cardiovascular:  Negative for chest pain, palpitations and leg swelling.   Gastrointestinal:  Positive for abdominal pain and nausea. Negative for constipation, diarrhea and vomiting.   Genitourinary:  Positive for flank pain. Negative for dysuria, frequency, hematuria and urgency.   Musculoskeletal:  Positive for back pain.   Neurological:  Negative for dizziness, tremors, weakness, numbness and headaches.       Objective   /80   Pulse 56   Temp 35.9 °C  "(96.6 °F)   Resp 16   Ht 1.626 m (5' 4\")   Wt 111 kg (244 lb 9.6 oz)   SpO2 98%   BMI 41.99 kg/m²     Physical Exam  Constitutional:       General: She is not in acute distress.     Appearance: Normal appearance.   HENT:      Head: Normocephalic and atraumatic.      Mouth/Throat:      Mouth: Mucous membranes are moist.      Pharynx: Oropharynx is clear. No oropharyngeal exudate or posterior oropharyngeal erythema.   Eyes:      General: No scleral icterus.     Extraocular Movements: Extraocular movements intact.      Pupils: Pupils are equal, round, and reactive to light.   Cardiovascular:      Rate and Rhythm: Normal rate and regular rhythm.      Pulses: Normal pulses.      Heart sounds: No murmur heard.     No friction rub. No gallop.   Pulmonary:      Effort: Pulmonary effort is normal.      Breath sounds: No wheezing, rhonchi or rales.   Abdominal:      General: There is no distension.      Palpations: There is no mass.      Tenderness: There is no abdominal tenderness. There is left CVA tenderness. There is no right CVA tenderness, guarding or rebound.   Musculoskeletal:      Thoracic back: Spasms and tenderness present. No swelling, deformity or bony tenderness. No scoliosis.   Skin:     General: Skin is warm.      Coloration: Skin is not jaundiced or pale.      Findings: No erythema or rash.   Neurological:      General: No focal deficit present.      Mental Status: She is alert and oriented to person, place, and time.      Cranial Nerves: No cranial nerve deficit.      Sensory: No sensory deficit.      Coordination: Coordination normal.      Gait: Gait normal.         Assessment/Plan   Problem List Items Addressed This Visit       Acquired hypothyroidism     Well-controlled, continue current medications and management.         Relevant Medications    levothyroxine (Synthroid, Levoxyl) 50 mcg tablet    Other Relevant Orders    Follow Up In [facility] - PCP    Thyroid Stimulating Hormone    " "Thyroxine, Free    Ankylosing spondylitis     Chronic Condition Documentation : Stable based on symptoms and exam.  Continue established treatment plan and follow-up at least yearly.         Essential hypertension - Primary     Well-controlled, continue current medications and management.  Dietary sodium restriction.  Regular aerobic exercise program is recommended to help achieve and maintain normal body weight, fitness and improve lipid balance. .  Dietary changes: Increase soluble fiber  Plant sterols 2grams per day (e.g. Benecol)  Reduce saturated fat, \"trans\" monounsaturated fatty acids, and cholesterol         Relevant Orders    CBC and Auto Differential    Comprehensive Metabolic Panel    Follow Up In [facility] - PCP    Lipid Panel    Left flank pain     We will order CT of the Abdomen and Pelvis for further evaluation.  Follow-up if persistent or worsening symptoms otherwise after results.         Relevant Orders    POCT UA Automated manually resulted (Completed)    Urine Culture    Urinalysis with Reflex Microscopic    CT abdomen pelvis wo IV contrast    Mixed hyperlipidemia     The nature of cardiac risk has been fully discussed with this patient. Discussed cardiovascular risk analysis and appropriate diet with the need for lifelong measures to reduce the risk. A regular exercise program is recommended to help achieve and maintain normal body weight, fitness and improve lipid balance. Patient education provided. They understand and agree with this course of treatment. They will return with new or worsening symptoms. Patient instructed to remain current with appropriate annual health maintenance.          Relevant Orders    CBC and Auto Differential    Comprehensive Metabolic Panel    Follow Up In [facility] - PCP    Lipid Panel    Morbid obesity with BMI of 40.0-44.9, adult (Multi)     Continue decrease calorie diet and not more than 1500 calorie per day diet and low-fat diet.  " Continue with regular exercise program.  We advised exercise at least 5 days a week for at least 45 minutes and also a minimum of 10,000 steps a day.  The detrimental effects of obesity on health were discussed.         Reactive airway disease without complication ([facility]HS-HCC)     Well-controlled, continue current medications and management.         Renal colic     We will order CT of the Abdomen and Pelvis for further evaluation.  Follow-up if persistent or worsening symptoms otherwise after results.         Relevant Orders    CT abdomen pelvis wo IV contrast    Ventricular tachycardia, paroxysmal (Multi)     Chronic Condition Documentation : Stable based on symptoms and exam.  Continue established treatment plan and follow-up at least yearly.          Scribe Attestation  By signing my name below, ICarl Scribe   attest that this documentation has been prepared under the direction and in the presence of Niall Miranda MD.

## 2025-02-05 NOTE — ASSESSMENT & PLAN NOTE
We will order CT of the Abdomen and Pelvis for further evaluation.  Follow-up if persistent or worsening symptoms otherwise after results.

## 2025-02-06 ENCOUNTER — HOSPITAL ENCOUNTER (OUTPATIENT)
Dept: RADIOLOGY | Facility: HOSPITAL | Age: 65
Discharge: HOME | End: 2025-02-06
Payer: MEDICARE

## 2025-02-06 DIAGNOSIS — N23 RENAL COLIC: ICD-10-CM

## 2025-02-06 DIAGNOSIS — R10.9 LEFT FLANK PAIN: ICD-10-CM

## 2025-02-06 LAB
APPEARANCE UR: CLEAR
BACTERIA #/AREA URNS HPF: ABNORMAL /HPF
BILIRUB UR QL STRIP: NEGATIVE
COLOR UR: YELLOW
GLUCOSE UR QL STRIP: NEGATIVE
HGB UR QL STRIP: NEGATIVE
HYALINE CASTS #/AREA URNS LPF: ABNORMAL /LPF
KETONES UR QL STRIP: NEGATIVE
LEUKOCYTE ESTERASE UR QL STRIP: ABNORMAL
NITRITE UR QL STRIP: NEGATIVE
PH UR STRIP: 6 [PH] (ref 5–8)
PROT UR QL STRIP: ABNORMAL
RBC #/AREA URNS HPF: ABNORMAL /HPF
SERVICE CMNT-IMP: ABNORMAL
SP GR UR STRIP: 1.02 (ref 1–1.03)
SQUAMOUS #/AREA URNS HPF: ABNORMAL /HPF
WBC #/AREA URNS HPF: ABNORMAL /HPF

## 2025-02-06 PROCEDURE — 74176 CT ABD & PELVIS W/O CONTRAST: CPT

## 2025-02-07 ENCOUNTER — HOSPITAL ENCOUNTER (OUTPATIENT)
Dept: RADIOLOGY | Facility: HOSPITAL | Age: 65
Discharge: HOME | End: 2025-02-07
Payer: MEDICARE

## 2025-02-07 DIAGNOSIS — M54.14 THORACIC RADICULOPATHY: ICD-10-CM

## 2025-02-07 LAB — BACTERIA UR CULT: NORMAL

## 2025-02-07 PROCEDURE — 72070 X-RAY EXAM THORAC SPINE 2VWS: CPT

## 2025-02-07 PROCEDURE — 72146 MRI CHEST SPINE W/O DYE: CPT

## 2025-02-12 ENCOUNTER — TELEPHONE (OUTPATIENT)
Dept: CARDIOLOGY | Facility: CLINIC | Age: 65
End: 2025-02-12
Payer: COMMERCIAL

## 2025-02-19 ENCOUNTER — APPOINTMENT (OUTPATIENT)
Dept: PRIMARY CARE | Facility: CLINIC | Age: 65
End: 2025-02-19
Payer: MEDICARE

## 2025-02-19 VITALS
WEIGHT: 244 LBS | HEIGHT: 64 IN | TEMPERATURE: 97.3 F | BODY MASS INDEX: 41.66 KG/M2 | HEART RATE: 55 BPM | DIASTOLIC BLOOD PRESSURE: 82 MMHG | OXYGEN SATURATION: 97 % | RESPIRATION RATE: 20 BRPM | SYSTOLIC BLOOD PRESSURE: 126 MMHG

## 2025-02-19 DIAGNOSIS — E66.01 MORBID OBESITY WITH BMI OF 40.0-44.9, ADULT (MULTI): ICD-10-CM

## 2025-02-19 DIAGNOSIS — I10 ESSENTIAL HYPERTENSION: ICD-10-CM

## 2025-02-19 DIAGNOSIS — G89.29 CHRONIC LEFT-SIDED THORACIC BACK PAIN: Primary | ICD-10-CM

## 2025-02-19 DIAGNOSIS — M54.14 THORACIC RADICULOPATHY: ICD-10-CM

## 2025-02-19 DIAGNOSIS — M54.6 CHRONIC LEFT-SIDED THORACIC BACK PAIN: Primary | ICD-10-CM

## 2025-02-19 DIAGNOSIS — Q76.49 CONGENITAL FUSION OF SPINE (VERTEBRA): ICD-10-CM

## 2025-02-19 PROCEDURE — 3079F DIAST BP 80-89 MM HG: CPT | Performed by: FAMILY MEDICINE

## 2025-02-19 PROCEDURE — 1160F RVW MEDS BY RX/DR IN RCRD: CPT | Performed by: FAMILY MEDICINE

## 2025-02-19 PROCEDURE — 99213 OFFICE O/P EST LOW 20 MIN: CPT | Performed by: FAMILY MEDICINE

## 2025-02-19 PROCEDURE — 3074F SYST BP LT 130 MM HG: CPT | Performed by: FAMILY MEDICINE

## 2025-02-19 PROCEDURE — 1159F MED LIST DOCD IN RCRD: CPT | Performed by: FAMILY MEDICINE

## 2025-02-19 PROCEDURE — 1036F TOBACCO NON-USER: CPT | Performed by: FAMILY MEDICINE

## 2025-02-19 PROCEDURE — 3008F BODY MASS INDEX DOCD: CPT | Performed by: FAMILY MEDICINE

## 2025-02-19 PROCEDURE — G2211 COMPLEX E/M VISIT ADD ON: HCPCS | Performed by: FAMILY MEDICINE

## 2025-02-19 PROCEDURE — 1123F ACP DISCUSS/DSCN MKR DOCD: CPT | Performed by: FAMILY MEDICINE

## 2025-02-19 RX ORDER — LISINOPRIL 5 MG/1
5 TABLET ORAL DAILY
Qty: 90 TABLET | Refills: 1 | Status: SHIPPED | OUTPATIENT
Start: 2025-02-19

## 2025-02-19 RX ORDER — HYDROCHLOROTHIAZIDE 25 MG/1
25 TABLET ORAL DAILY
Qty: 90 TABLET | Refills: 1 | Status: SHIPPED | OUTPATIENT
Start: 2025-02-19

## 2025-02-19 ASSESSMENT — ENCOUNTER SYMPTOMS
SORE THROAT: 0
CHILLS: 0
NUMBNESS: 1
WEAKNESS: 1
DIARRHEA: 0
DYSURIA: 0
WHEEZING: 0
SHORTNESS OF BREATH: 0
TREMORS: 0
FEVER: 0
COUGH: 0
LEG PAIN: 0
RHINORRHEA: 0
CONSTIPATION: 0
PALPITATIONS: 0
HEMATURIA: 0
TINGLING: 0
DIZZINESS: 0
FREQUENCY: 0
HEADACHES: 0
ABDOMINAL PAIN: 1
VOMITING: 0
BACK PAIN: 1

## 2025-02-19 NOTE — ASSESSMENT & PLAN NOTE
We will Refer her for Physical Therapy. Follow-up if persistent or worsening symptoms otherwise when necessary.

## 2025-02-19 NOTE — PROGRESS NOTES
"Subjective   Patient ID: Kiara Ordaz is a 64 y.o. female who presents for Follow-up (Thoracic Back Pain and MRI results).    Back Pain  This is a chronic problem. The current episode started more than 1 month ago. The problem occurs constantly. The problem is unchanged. The pain is present in the thoracic spine. The quality of the pain is described as stabbing. The pain does not radiate. The pain is at a severity of 4/10. The pain is The same all the time. The symptoms are aggravated by bending, position and twisting. Stiffness is present All day. Associated symptoms include abdominal pain, numbness and weakness. Pertinent negatives include no chest pain, dysuria, fever, headaches, leg pain or tingling. She has tried heat for the symptoms. The treatment provided mild relief.   The pain is anteriorly to the left flank and to the left upper quadrant.  The pain is worse with movement.  She has had it for several months.  She does have a history of thoracic spondylosis with fusion of some of the thoracic vertebrae.    Review of Systems   Constitutional:  Negative for chills and fever.   HENT:  Negative for congestion, ear pain, nosebleeds, rhinorrhea and sore throat.    Respiratory:  Negative for cough, shortness of breath and wheezing.    Cardiovascular:  Negative for chest pain, palpitations and leg swelling.   Gastrointestinal:  Positive for abdominal pain. Negative for constipation, diarrhea and vomiting.   Genitourinary:  Negative for dysuria, frequency and hematuria.   Musculoskeletal:  Positive for back pain.   Neurological:  Positive for weakness and numbness. Negative for dizziness, tingling, tremors and headaches.     Objective   /82 (BP Location: Right arm, Patient Position: Sitting)   Pulse 55   Temp 36.3 °C (97.3 °F)   Resp 20   Ht 1.626 m (5' 4\")   Wt 111 kg (244 lb)   SpO2 97%   BMI 41.88 kg/m²     Physical Exam  Constitutional:       General: She is not in acute distress.     " Appearance: Normal appearance.   HENT:      Head: Normocephalic and atraumatic.      Mouth/Throat:      Mouth: Mucous membranes are moist.      Pharynx: Oropharynx is clear. No oropharyngeal exudate or posterior oropharyngeal erythema.   Eyes:      General: No scleral icterus.     Extraocular Movements: Extraocular movements intact.      Pupils: Pupils are equal, round, and reactive to light.   Cardiovascular:      Rate and Rhythm: Normal rate and regular rhythm.      Pulses: Normal pulses.      Heart sounds: No murmur heard.     No friction rub. No gallop.   Pulmonary:      Effort: Pulmonary effort is normal.      Breath sounds: No wheezing, rhonchi or rales.   Skin:     General: Skin is warm.      Coloration: Skin is not jaundiced or pale.      Findings: No erythema or rash.   Neurological:      General: No focal deficit present.      Mental Status: She is alert and oriented to person, place, and time.      Cranial Nerves: No cranial nerve deficit.      Sensory: No sensory deficit.      Coordination: Coordination normal.      Gait: Gait normal.     === 02/07/25 ===    MR THORACIC SPINE WO CONTRAST    - Impression -  1. 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  2. 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 There is also  nonspecific asymmetric height loss along the left lateral aspect of  T5. There is scoliosis as well as mildly exaggerated thoracic  kyphosis.  3. At T4-T5 there is severe bilateral facet arthropathy with  ligamentum flavum thickening/mineralization and disc uncovering  contributing to mild-to-moderate spinal canal stenosis and  "suspected  severe left/moderate to severe right neural foraminal narrowing.    MACRO:  None    Signed by: Rahul Carranza 2/11/2025 10:08 AM  Dictation workstation:   [id number]      Assessment/Plan   Problem List Items Addressed This Visit       Essential hypertension     Dietary sodium restriction.  Regular aerobic exercise program is recommended to help achieve and maintain normal body weight, fitness and improve lipid balance. .  Dietary changes: Increase soluble fiber  Plant sterols 2grams per day (e.g. Benecol)  Reduce saturated fat, \"trans\" monounsaturated fatty acids, and cholesterol          Morbid obesity with BMI of 40.0-44.9, adult (Multi)     Continue decrease calorie diet and not more than 1500 calorie per day diet and low-fat diet.  Continue with regular exercise program.  We advised exercise at least 5 days a week for at least 45 minutes and also a minimum of 10,000 steps a day.  The detrimental effects of obesity on health were discussed.         Congenital fusion of spine (vertebra)     Worsening based on symptoms and exam. Treatment plan established. Disease monitoring and precautions, any treatment changes, patient instructions and follow-up are documented in encounter note.          Chronic left-sided thoracic back pain - Primary     We will Refer her for Physical Therapy. Follow-up if persistent or worsening symptoms otherwise when necessary.         Relevant Orders    Referral to Physical Therapy    Thoracic radiculopathy     We will Refer her for Physical Therapy. Follow-up if persistent or worsening symptoms otherwise when necessary.         Relevant Orders    Referral to Physical Therapy     Scribe Attestation  By signing my name below, ICarl Scribe   attest that this documentation has been prepared under the direction and in the presence of Niall Miranda MD.  "

## 2025-02-19 NOTE — ASSESSMENT & PLAN NOTE
Worsening based on symptoms and exam. Treatment plan established. Disease monitoring and precautions, any treatment changes, patient instructions and follow-up are documented in encounter note.

## 2025-02-19 NOTE — TELEPHONE ENCOUNTER
Rx Refill Request     Please advise or deny. Last dispensed 25 #[phone]      Name: Kiara Ordaz  :  1960     Date of last appointment:  2025   Date of next appointment:  2025   Best number to reach patient:  [phone]

## 2025-02-19 NOTE — PATIENT INSTRUCTIONS
BMI was above normal measurement. Current weight: 111 kg (244 lb)  Weight change since last visit (-) denotes wt loss -0.6 lbs   Weight loss needed to achieve BMI 25: 98.7 Lbs  Weight loss needed to achieve BMI 30: 69.6 Lbs  Advised to Increase physical activity.

## 2025-02-21 ENCOUNTER — APPOINTMENT (OUTPATIENT)
Dept: PRIMARY CARE | Facility: CLINIC | Age: 65
End: 2025-02-21
Payer: COMMERCIAL

## 2025-02-27 ENCOUNTER — APPOINTMENT (OUTPATIENT)
Dept: CARDIOLOGY | Facility: CLINIC | Age: 65
End: 2025-02-27
Payer: MEDICARE

## 2025-02-27 VITALS
BODY MASS INDEX: 41.1 KG/M2 | SYSTOLIC BLOOD PRESSURE: 120 MMHG | WEIGHT: 246.7 LBS | HEART RATE: 62 BPM | HEIGHT: 65 IN | DIASTOLIC BLOOD PRESSURE: 72 MMHG

## 2025-02-27 DIAGNOSIS — G47.33 OBSTRUCTIVE SLEEP APNEA ON CPAP: ICD-10-CM

## 2025-02-27 DIAGNOSIS — E78.2 MIXED HYPERLIPIDEMIA: ICD-10-CM

## 2025-02-27 DIAGNOSIS — I49.3 PVC (PREMATURE VENTRICULAR CONTRACTION): ICD-10-CM

## 2025-02-27 DIAGNOSIS — I25.10 CAD S/P PERCUTANEOUS CORONARY ANGIOPLASTY: ICD-10-CM

## 2025-02-27 DIAGNOSIS — I10 ESSENTIAL HYPERTENSION: ICD-10-CM

## 2025-02-27 DIAGNOSIS — Z98.61 CAD S/P PERCUTANEOUS CORONARY ANGIOPLASTY: ICD-10-CM

## 2025-02-27 DIAGNOSIS — E66.01 MORBID OBESITY WITH BMI OF 40.0-44.9, ADULT (MULTI): ICD-10-CM

## 2025-02-27 DIAGNOSIS — I47.29 VENTRICULAR TACHYCARDIA, PAROXYSMAL (MULTI): ICD-10-CM

## 2025-02-27 DIAGNOSIS — I25.2 STATUS POST MYOCARDIAL INFARCTION: ICD-10-CM

## 2025-02-27 DIAGNOSIS — Z78.9 NEVER SMOKED ANY SUBSTANCE: ICD-10-CM

## 2025-02-27 PROCEDURE — 1159F MED LIST DOCD IN RCRD: CPT | Performed by: INTERNAL MEDICINE

## 2025-02-27 PROCEDURE — 1036F TOBACCO NON-USER: CPT | Performed by: INTERNAL MEDICINE

## 2025-02-27 PROCEDURE — 3074F SYST BP LT 130 MM HG: CPT | Performed by: INTERNAL MEDICINE

## 2025-02-27 PROCEDURE — 3078F DIAST BP <80 MM HG: CPT | Performed by: INTERNAL MEDICINE

## 2025-02-27 PROCEDURE — 99213 OFFICE O/P EST LOW 20 MIN: CPT | Performed by: INTERNAL MEDICINE

## 2025-02-27 PROCEDURE — 3008F BODY MASS INDEX DOCD: CPT | Performed by: INTERNAL MEDICINE

## 2025-02-27 PROCEDURE — 1123F ACP DISCUSS/DSCN MKR DOCD: CPT | Performed by: INTERNAL MEDICINE

## 2025-02-27 NOTE — PATIENT INSTRUCTIONS
Continue same medications and treatments.   Patient educated on proper medication use.   Patient educated on risk factor modification.   Please bring any lab results from other providers / physicians to your next appointment.     Please bring all medicines, vitamins, and herbal supplements with you when you come to the office.     Prescriptions will not be filled unless you are compliant with your follow up appointments or have a follow up appointment scheduled as per instruction of your physician. Refills should be requested at the time of your visit.    FOLLOW  UP IN 1 YEAR     I, Aaliyah Nettles LPN, am scribing for and in the presence of Dr. Sunny Byrnes MD, FACC

## 2025-02-27 NOTE — PROGRESS NOTES
Referred by Dr. Harper ref. provider found provider found for   Chief Complaint   Patient presents with    Follow-up     Pt is here today following up after 9 months for CAD S/P percutaneous coronary angioplasty          Chief complaint:   Chief Complaint   Patient presents with    Follow-up     Pt is here today following up after 9 months for CAD S/P percutaneous coronary angioplasty          History of Present Illness  Kiara Ordaz is a 64 y.o. year old female patient is here for follow-up of hypertension.  Blood pressure is adequately controlled.  She has been doing well from a cardiac standpoint no complaint no symptoms of chest pain or shortness of breath however she had some pain in the side and the back and was told that she has arthritic pain.  Discussed with the patient in length we will continue medication will call for any problem and follow-up as scheduled    Past Medical History  Past Medical History:   Diagnosis Date    Asthma 1990’s    Coronary artery disease     Depression Not sure    Difficulty walking 2007-08    Benjamín virk 2013    GERD (gastroesophageal reflux disease) Mid 2000’s    Gout 2022    Hammer toe 7-2023    HL (hearing loss) Young    Hyperlipidemia     Hypertension     Ingrown toenail 1990    Myocardial infarction (Multi) 6/9/2017    Sleep apnea        Social History  Social History     Tobacco Use    Smoking status: Never    Smokeless tobacco: Never   Vaping Use    Vaping status: Not on file   Substance Use Topics    Alcohol use: Yes     Comment: Very rairly    Drug use: Never       Family History     Family History   Problem Relation Name Age of Onset    Hypertension Mother Brother     Other (coronary bypass) Mother Brother     Coronary artery disease Mother Brother     Arthritis Mother Brother     Heart disease Mother Brother     Miscarriages / Stillbirths Mother Brother     Heart attack Mother Brother     Lymphoma Father Tomy GARCÍA’Miki III brother     Cancer Father Tomy  RICKYSusanaMiki III brother     Diabetes Father Tomy Conn III brother     Stroke Sister Monika Reising     Hypertension Sister Monika Reising     Diabetes type II Sister Monika Reising     Other (pacemaker) Sister Monika Reising     Diabetes Sister Monika Reising     Heart disease Sister Monika Reising     Stroke Brother Lawrence Conn     Coronary artery disease Brother Lawrence Fabien     Diabetes type II Brother Lawrence RICKYSusanaMiki     Hypertension Brother Lawrence RICKY’Miki     Diabetes Brother Lawrence RICKYSusanaMiki     Accidental death Sister Radha Monk     Arthritis Sister Cecily Herrera     Depression Sister Cecily Herrera     Diabetes type II Sister Cecily Herrera     Obesity Sister Cecily Herrera     Diabetes Brother Heri GARCÍASusanaMiki     Heart disease Brother Heri Fabien     Hypertension Brother Heri KatherineMiki     Diabetes type II Brother Heri ARCEMiki     Stroke Brother Heri YAZMINMiki        Review of Systems  As per HPI, all other systems reviewed and negative.    Allergies:  Allergies   Allergen Reactions    Hydrocodone Other    Iodine Other    Penicillins Hives        Outpatient Medications:  Current Outpatient Medications   Medication Instructions    acetaminophen (TYLENOL) 650 mg, 2 times daily    albuterol 90 mcg/actuation inhaler 2 puffs, inhalation, Every 4 hours PRN    allopurinol (ZYLOPRIM) 300 mg, oral, Daily    aspirin 81 mg, Daily RT    atorvastatin (LIPITOR) 80 mg, oral, Nightly    b complex 0.4 mg tablet 1 tablet, Daily    DULoxetine (CYMBALTA) 60 mg, oral, Daily    hydroCHLOROthiazide (HYDRODIURIL) 25 mg, oral, Daily    isosorbide mononitrate ER (IMDUR) 30 mg, Daily    levothyroxine (SYNTHROID, LEVOXYL) 50 mcg, oral, Daily, Take on an empty stomach at the same time each day, either 30 to 60 minutes prior to breakfast    lisinopril 5 mg, oral, Daily    meloxicam (MOBIC) 15 mg, oral, Daily    metoprolol tartrate (Lopressor) 25 mg tablet TAKE 2 TABLETS BY MOUTH EVERY MORNING AND 1 TABLET BY MOUTH  EVERY EVENING    multivitamin tablet 1 tablet, Daily    neomycin-polymyxin-dexAMETHasone (Maxitrol) 3.5mg/mL-10,000 unit/mL-0.1 % ophthalmic suspension INSTILL 1 DROP INTO EACH EYE TWICE A DAY    nitroglycerin (NITROSTAT) 0.4 mg, sublingual, As needed    pantoprazole (PROTONIX) 40 mg, oral, Daily    potassium chloride CR 10 mEq ER tablet 10 mEq, 2 times daily         Vitals:  Vitals:    02/27/25 1030   BP: 120/72   Pulse: 62       Physical Exam:  Physical Exam  Constitutional:       Appearance: Normal appearance.   HENT:      Head: Normocephalic.   Eyes:      Pupils: Pupils are equal, round, and reactive to light.   Cardiovascular:      Rate and Rhythm: Normal rate and regular rhythm.      Pulses: Normal pulses.      Heart sounds: Normal heart sounds.   Pulmonary:      Effort: Pulmonary effort is normal.      Breath sounds: Normal breath sounds.   Musculoskeletal:         General: Normal range of motion.   Skin:     General: Skin is warm and dry.   Neurological:      General: No focal deficit present.      Mental Status: She is alert and oriented to person, place, and time.             Assessment/Plan   Diagnoses and all orders for this visit:  CAD S/P percutaneous coronary angioplasty  Essential hypertension  Mixed hyperlipidemia  PVC (premature ventricular contraction)  Ventricular tachycardia, paroxysmal (Multi)  Status post myocardial infarction  Obstructive sleep apnea on CPAP  Never smoked any substance  Morbid obesity with BMI of 40.0-44.9, adult (Multi)      IAaliyah LPN am scribing for, and in the presence of Dr. Sunny Byrnes MD, [facility].    I, Dr. Sunny Byrnes MD, [facility], personally performed the services described in the documentation as scribed by Aaliyah Nettles LPN   in my presence, and confirm it is both accurate and complete.    Sunny Byrnes MD [facility]  Interventional Cardiology   of [facility]     Thank you for allowing me to participate in the care of this patient.  Please do not hesitate to contact me with any further questions or concerns.

## 2025-03-14 NOTE — PROGRESS NOTES
Electrophysiology Office Visit       CHIEF COMPLAINT  Chief Complaint   Patient presents with    Follow-up     Pt is here today following up after 6 months, Ventricular tachycardia, paroxysmal (Multi)        Primary EP doctor: Dr Dorantes  Primary Cardiologist: Dr Byrnes    EP History: NSVT w/ frequent PVCs, negative EP study (2017), hx Loop recorder ([id number]), CAD s/p stent, Severe HONEY w/ hypoxemia (2024 study)  6/9/2017 Proximal Cx Stent  8/23/2017 Loop recorder implanted, Removed 2/25/2021  Controlled on metoprolol tartrate 50mg AM, 25mg PM. Other cardiac meds hydrochlorothiazide 25mg daily, Imdur 30mg daily, lisinopril 5mg daily, KCL 10meq BID.       HPI: 3/18/2025  64 year old female pmhx NSVT w/ frequent PVCs, negative EP study (2017), hx Loop recorder ([id number]), CAD s/p stent, Severe HONEY w/ hypoxemia (2024 study).     She reports feeling well cardiac wise. Has pinched nerve in her thoracic spine. Many orthopedic complaints.   Overall she denies cp, sob, light headedness or dizziness. Minimal BEARDEN that she contributes to deconditioning. Dizziness if she gets up too fast.     Today's EKG Interpretation: NSR, rate 65bpm, qrs 90ms, qtc 449ms    VITALS  Vitals:    03/18/25 1123   BP: 116/84   Pulse: 65     Wt Readings from Last 4 Encounters:   03/18/25 112 kg (248 lb)   02/27/25 112 kg (246 lb 11.2 oz)   02/19/25 111 kg (244 lb)   02/05/25 111 kg (244 lb 9.6 oz)       PHYSICAL EXAM:  GENERAL:  Well developed, well nourished, in no acute distress.  NEURO/PSYCH:  No focal deficits. A&O x 3   LUNGS:  Clear to auscultation bilaterally. No wheezing, rhonci, or crackles  HEART:  RRR, no M/R/G.   EXTREMITIES:  Warm with good color, no clubbing or cyanosis.  No pitting edema.     Past Medical History:   Diagnosis Date    Asthma 1990’s    Coronary artery disease     Depression Not sure    Difficulty walking 2007-08    Fallen arches 2013    GERD (gastroesophageal reflux disease) Mid 2000’s    Gout 2022    Hammer toe 7-2023     HL (hearing loss) [place]    Hyperlipidemia     Hypertension     Ingrown toenail 1990    Myocardial infarction (Multi) 6/9/2017    Sleep apnea        Past Surgical History:   Procedure Laterality Date    CARDIAC SURGERY  6-9-2017    CORONARY STENT PLACEMENT  6-9-2017    FOOT SURGERY  2013    OTHER SURGICAL HISTORY  01/13/2022    Surgery    OTHER SURGICAL HISTORY  01/13/2022    Cardiac catheterization with stent placement    OTHER SURGICAL HISTORY  01/13/2022    Colonoscopy    OTHER SURGICAL HISTORY  01/13/2022    Median nerve neuroplasty    OTHER SURGICAL HISTORY  01/13/2022    Percutaneous transluminal coronary angioplasty    OTHER SURGICAL HISTORY  01/13/2022    Nephrectomy    OTHER SURGICAL HISTORY  01/13/2022    Neck surgery    OTHER SURGICAL HISTORY  01/13/2022    Foot surgery    OTHER SURGICAL HISTORY  01/13/2022    Tubal ligation    SPINE SURGERY  2-    TUBAL LIGATION  11-12-94    WISDOM TOOTH EXTRACTION  2019       Social History     Tobacco Use    Smoking status: Never    Smokeless tobacco: Never   Vaping Use    Vaping status: Not on file   Substance Use Topics    Alcohol use: Yes     Comment: Very rairly    Drug use: Never     Family hx HTN, CAD    Allergies   Allergen Reactions    Hydrocodone Other    Iodine Other    Penicillins Hives        Current Outpatient Medications   Medication Instructions    acetaminophen (TYLENOL) 650 mg, 2 times daily    albuterol 90 mcg/actuation inhaler 2 puffs, inhalation, Every 4 hours PRN    allopurinol (ZYLOPRIM) 300 mg, oral, Daily    aspirin 81 mg, Daily RT    atorvastatin (LIPITOR) 80 mg, oral, Nightly    b complex 0.4 mg tablet 1 tablet, Daily    DULoxetine (CYMBALTA) 60 mg, oral, Daily    hydroCHLOROthiazide (HYDRODIURIL) 25 mg, oral, Daily    isosorbide mononitrate ER (IMDUR) 30 mg, Daily    levothyroxine (SYNTHROID, LEVOXYL) 50 mcg, oral, Daily, Take on an empty stomach at the same time each day, either 30 to 60 minutes prior to breakfast    lisinopril 5  mg, oral, Daily    meloxicam (MOBIC) 15 mg, oral, Daily    metoprolol tartrate (Lopressor) 25 mg tablet TAKE 2 TABLETS BY MOUTH EVERY MORNING AND 1 TABLET BY MOUTH EVERY EVENING    multivitamin tablet 1 tablet, Daily    neomycin-polymyxin-dexAMETHasone (Maxitrol) 3.5mg/mL-10,000 unit/mL-0.1 % ophthalmic suspension INSTILL 1 DROP INTO EACH EYE TWICE A DAY    nitroglycerin (NITROSTAT) 0.4 mg, sublingual, As needed    pantoprazole (PROTONIX) 40 mg, oral, Daily    potassium chloride CR 10 mEq ER tablet 10 mEq, 2 times daily      Relevant reports:   6/9/2017 Heart Catheterization  Coronary anatomy is left dominant  Left main coronary artery is normal  LAD presents with luminal irregularities  Circumflex is significantly obstructed  Proximal circumflex lesion 99% stenosis  Proximal circumflex lesion status post stenting with 0% residual stenosis  RCA presents with luminal irregularities  LV EF 45%  LV with inferior hypokinesis    8/19/2019 Nuclear stress test  IMPRESSION:  Normal [employer] [facility] cardiac perfusion stress test.  No evidence of ischemia or myocardial infarction by perfusion imaging.  Normal left ventricular systolic function, ejection fraction 68%.  When compared to prior study from October 2018, there has been no  significant changes.  Non invasive risk stratification is low risk    8/19/2019 ECHO  CONCLUSIONS:   1. The left ventricular systolic function is normal with a 55% estimated ejection fraction.   2. Spectral Doppler shows an impaired relaxation pattern of left ventricular diastolic filling.   3. There is mitral stenosis is not seen.   4. Aortic valve stenosis is not present.   5. The main pulmonary artery is normal in size, and position, with normal bifurcation into the left and right pulmonary arteries.    8/23/2017 Electrophysiology Study  Normal SA luís function  Normal AV luís function  Normal HIS Purkinje conduction  Centric VA conduction  No inducible SVT and no inducible VT in baseline  [facility]    2/15/2024 Sleep study  Severe obstructive sleep apnea.  The Sp02 deon was 78.0%.  Suboptimal titration with incomplete resolution of their sleep apnea despite attempts at different pressure settings and mask fittings.  Sleep-related hypoxemia was present during this study. This may be related to coexisting cardiopulmonary disorders.   No significant periodic limb movements of sleep were noted.  Fragmentation of sleep noted during this study appeared to be due to frequent respiratory arousals  RECOMMENDATIONS  Consider initiation of Auto-CPAP at the following settings: 10-20 cm H2O with heated humidification via a  F&P Shazia FF S/M  Based on this sleep study, the patient should be prescribed supplemental oxygen at 3-4 LPM while sleeping bled into the PAP circuit.  Consider an additional titration study with CPAP to start at the following settings: 10cm H2O.      Patient Active Problem List   Diagnosis    Bilateral sensorineural hearing loss    Bruit of left carotid artery    CAD S/P percutaneous coronary angioplasty    Cerumen impaction    Chest pain    Hypokalemia    Low back pain with sciatica    Malformation    Mixed hyperlipidemia    Numbness of face    Obstructive sleep apnea on CPAP    Essential hypertension    PVC (premature ventricular contraction)    Status post myocardial infarction    Ventricular tachycardia, paroxysmal (Multi)    Morbid obesity with BMI of 40.0-44.9, adult (Multi)    Ankylosing spondylitis    Cervical spine ankylosis    Spinal stenosis in cervical region    Cervicalgia    Chronic ankle pain, bilateral    Chronic pain syndrome    Congenital fusion of spine (vertebra)    Degeneration of cervical intervertebral disc    Gastroesophageal reflux disease    History of fusion of cervical spine    Acquired hypothyroidism    Vitamin D deficiency    Welcome to Medicare preventive visit    Numbness and tingling of both feet    Multiple nevi    Hammer toe of right foot    Never smoked any  substance    Lumbar radiculopathy    Lumbar spondylosis with myelopathy    Acute UTI    Numbness and tingling of both lower extremities    Spinal stenosis of lumbar region with neurogenic claudication    Idiopathic peripheral neuropathy    Lipoma of forearm    Anemia    Encounter for medication review and counseling    Encounter to discuss treatment options    Routine general medical examination at a health care facility    Benign paroxysmal positional vertigo due to bilateral vestibular disorder    Reactive airway disease without complication (HHS-HCC)    Chronic idiopathic gout    Left flank pain    Renal colic    Chronic left-sided thoracic back pain    Thoracic radiculopathy        ASSESSMENT AND PLAN  Problem List Items Addressed This Visit       Ventricular tachycardia, paroxysmal (Multi) - Primary w/ hx PVCs  Doing well on Metoprolol tartrate 50mg AM and 25mg PM  Follow up with Dr Dorantes in 6 months   Hx CAD s/p stenting in 2017- no sign or symptoms of progressive disease.     LISS Washington, PAEarnestC

## 2025-03-18 ENCOUNTER — OFFICE VISIT (OUTPATIENT)
Dept: CARDIOLOGY | Facility: CLINIC | Age: 65
End: 2025-03-18
Payer: MEDICARE

## 2025-03-18 ENCOUNTER — APPOINTMENT (OUTPATIENT)
Dept: CARDIOLOGY | Facility: CLINIC | Age: 65
End: 2025-03-18
Payer: MEDICARE

## 2025-03-18 VITALS
DIASTOLIC BLOOD PRESSURE: 84 MMHG | WEIGHT: 248 LBS | HEIGHT: 65 IN | BODY MASS INDEX: 41.32 KG/M2 | HEART RATE: 65 BPM | SYSTOLIC BLOOD PRESSURE: 116 MMHG

## 2025-03-18 DIAGNOSIS — I47.29 VENTRICULAR TACHYCARDIA, PAROXYSMAL (MULTI): Primary | ICD-10-CM

## 2025-03-18 PROCEDURE — 3008F BODY MASS INDEX DOCD: CPT | Performed by: PHYSICIAN ASSISTANT

## 2025-03-18 PROCEDURE — 3079F DIAST BP 80-89 MM HG: CPT | Performed by: PHYSICIAN ASSISTANT

## 2025-03-18 PROCEDURE — 99214 OFFICE O/P EST MOD 30 MIN: CPT | Performed by: PHYSICIAN ASSISTANT

## 2025-03-18 PROCEDURE — 1159F MED LIST DOCD IN RCRD: CPT | Performed by: PHYSICIAN ASSISTANT

## 2025-03-18 PROCEDURE — 93000 ELECTROCARDIOGRAM COMPLETE: CPT | Performed by: INTERNAL MEDICINE

## 2025-03-18 PROCEDURE — 3074F SYST BP LT 130 MM HG: CPT | Performed by: PHYSICIAN ASSISTANT

## 2025-03-18 PROCEDURE — 1123F ACP DISCUSS/DSCN MKR DOCD: CPT | Performed by: PHYSICIAN ASSISTANT

## 2025-03-18 NOTE — PATIENT INSTRUCTIONS
Great to see you today.   Please take your medications and or do life style behavior modifications as discussed.   Please make appointment in 6 months with Dr Dorantes  Please call if you have any questions or concerns.  Please go to emergency department if you have abrupt onset of chest, shortness of breath, light headedness or dizziness.

## 2025-04-13 ENCOUNTER — APPOINTMENT (OUTPATIENT)
Dept: RADIOLOGY | Facility: HOSPITAL | Age: 65
End: 2025-04-13
Payer: MEDICARE

## 2025-04-13 ENCOUNTER — HOSPITAL ENCOUNTER (EMERGENCY)
Facility: HOSPITAL | Age: 65
Discharge: HOME | End: 2025-04-13
Attending: EMERGENCY MEDICINE
Payer: MEDICARE

## 2025-04-13 VITALS
TEMPERATURE: 97.9 F | WEIGHT: 245 LBS | SYSTOLIC BLOOD PRESSURE: 152 MMHG | BODY MASS INDEX: 41.83 KG/M2 | HEART RATE: 52 BPM | DIASTOLIC BLOOD PRESSURE: 72 MMHG | RESPIRATION RATE: 16 BRPM | OXYGEN SATURATION: 95 % | HEIGHT: 64 IN

## 2025-04-13 DIAGNOSIS — M79.604 PAIN OF RIGHT LOWER EXTREMITY: Primary | ICD-10-CM

## 2025-04-13 LAB
ALBUMIN SERPL BCP-MCNC: 4.1 G/DL (ref 3.4–5)
ALP SERPL-CCNC: 61 U/L (ref 33–136)
ALT SERPL W P-5'-P-CCNC: 15 U/L (ref 7–45)
ANION GAP SERPL CALC-SCNC: 13 MMOL/L (ref 10–20)
AST SERPL W P-5'-P-CCNC: 15 U/L (ref 9–39)
BASOPHILS # BLD AUTO: 0.06 X10*3/UL (ref 0–0.1)
BASOPHILS NFR BLD AUTO: 0.8 %
BILIRUB SERPL-MCNC: 0.7 MG/DL (ref 0–1.2)
BUN SERPL-MCNC: 24 MG/DL (ref 6–23)
CALCIUM SERPL-MCNC: 9.2 MG/DL (ref 8.6–10.3)
CHLORIDE SERPL-SCNC: 102 MMOL/L (ref 98–107)
CO2 SERPL-SCNC: 27 MMOL/L (ref 21–32)
CREAT SERPL-MCNC: 1.23 MG/DL (ref 0.5–1.05)
EGFRCR SERPLBLD CKD-EPI 2021: 49 ML/MIN/1.73M*2
EOSINOPHIL # BLD AUTO: 0.25 X10*3/UL (ref 0–0.7)
EOSINOPHIL NFR BLD AUTO: 3.5 %
ERYTHROCYTE [DISTWIDTH] IN BLOOD BY AUTOMATED COUNT: 13.3 % (ref 11.5–14.5)
GLUCOSE SERPL-MCNC: 125 MG/DL (ref 74–99)
HCT VFR BLD AUTO: 33.5 % (ref 36–46)
HGB BLD-MCNC: 11.3 G/DL (ref 12–16)
IMM GRANULOCYTES # BLD AUTO: 0.02 X10*3/UL (ref 0–0.7)
IMM GRANULOCYTES NFR BLD AUTO: 0.3 % (ref 0–0.9)
LYMPHOCYTES # BLD AUTO: 1.99 X10*3/UL (ref 1.2–4.8)
LYMPHOCYTES NFR BLD AUTO: 27.9 %
MCH RBC QN AUTO: 31.7 PG (ref 26–34)
MCHC RBC AUTO-ENTMCNC: 33.7 G/DL (ref 32–36)
MCV RBC AUTO: 94 FL (ref 80–100)
MONOCYTES # BLD AUTO: 0.52 X10*3/UL (ref 0.1–1)
MONOCYTES NFR BLD AUTO: 7.3 %
NEUTROPHILS # BLD AUTO: 4.3 X10*3/UL (ref 1.2–7.7)
NEUTROPHILS NFR BLD AUTO: 60.2 %
NRBC BLD-RTO: 0 /100 WBCS (ref 0–0)
PLATELET # BLD AUTO: 217 X10*3/UL (ref 150–450)
POTASSIUM SERPL-SCNC: 3.8 MMOL/L (ref 3.5–5.3)
PROT SERPL-MCNC: 6.9 G/DL (ref 6.4–8.2)
RBC # BLD AUTO: 3.57 X10*6/UL (ref 4–5.2)
SODIUM SERPL-SCNC: 138 MMOL/L (ref 136–145)
WBC # BLD AUTO: 7.1 X10*3/UL (ref 4.4–11.3)

## 2025-04-13 PROCEDURE — 27550 TREAT KNEE DISLOCATION: CPT | Performed by: EMERGENCY MEDICINE

## 2025-04-13 PROCEDURE — 73564 X-RAY EXAM KNEE 4 OR MORE: CPT | Mod: RIGHT SIDE | Performed by: RADIOLOGY

## 2025-04-13 PROCEDURE — 93971 EXTREMITY STUDY: CPT | Performed by: RADIOLOGY

## 2025-04-13 PROCEDURE — 80053 COMPREHEN METABOLIC PANEL: CPT | Performed by: EMERGENCY MEDICINE

## 2025-04-13 PROCEDURE — 85025 COMPLETE CBC W/AUTO DIFF WBC: CPT | Performed by: EMERGENCY MEDICINE

## 2025-04-13 PROCEDURE — 93971 EXTREMITY STUDY: CPT

## 2025-04-13 PROCEDURE — 36415 COLL VENOUS BLD VENIPUNCTURE: CPT | Performed by: EMERGENCY MEDICINE

## 2025-04-13 PROCEDURE — 73564 X-RAY EXAM KNEE 4 OR MORE: CPT | Mod: RT

## 2025-04-13 PROCEDURE — 96374 THER/PROPH/DIAG INJ IV PUSH: CPT | Mod: 59

## 2025-04-13 PROCEDURE — 96375 TX/PRO/DX INJ NEW DRUG ADDON: CPT | Mod: 59

## 2025-04-13 PROCEDURE — 99285 EMERGENCY DEPT VISIT HI MDM: CPT | Mod: 25 | Performed by: EMERGENCY MEDICINE

## 2025-04-13 PROCEDURE — 2500000004 HC RX 250 GENERAL PHARMACY W/ HCPCS (ALT 636 FOR OP/ED): Performed by: EMERGENCY MEDICINE

## 2025-04-13 RX ORDER — LIDOCAINE 50 MG/G
1 PATCH TOPICAL DAILY
Qty: 10 PATCH | Refills: 0 | Status: SHIPPED | OUTPATIENT
Start: 2025-04-13

## 2025-04-13 RX ORDER — ONDANSETRON HYDROCHLORIDE 2 MG/ML
4 INJECTION, SOLUTION INTRAVENOUS ONCE
Status: COMPLETED | OUTPATIENT
Start: 2025-04-13 | End: 2025-04-13

## 2025-04-13 RX ORDER — OXYCODONE AND ACETAMINOPHEN 5; 325 MG/1; MG/1
1 TABLET ORAL EVERY 6 HOURS PRN
Qty: 6 TABLET | Refills: 0 | Status: SHIPPED | OUTPATIENT
Start: 2025-04-13 | End: 2025-04-16

## 2025-04-13 RX ORDER — MORPHINE SULFATE 4 MG/ML
4 INJECTION, SOLUTION INTRAMUSCULAR; INTRAVENOUS ONCE
Status: COMPLETED | OUTPATIENT
Start: 2025-04-13 | End: 2025-04-13

## 2025-04-13 RX ADMIN — ONDANSETRON 4 MG: 2 INJECTION INTRAMUSCULAR; INTRAVENOUS at 12:03

## 2025-04-13 RX ADMIN — MORPHINE SULFATE 4 MG: 4 INJECTION, SOLUTION INTRAMUSCULAR; INTRAVENOUS at 12:03

## 2025-04-13 ASSESSMENT — PAIN DESCRIPTION - PROGRESSION: CLINICAL_PROGRESSION: NOT CHANGED

## 2025-04-13 ASSESSMENT — COLUMBIA-SUICIDE SEVERITY RATING SCALE - C-SSRS
2. HAVE YOU ACTUALLY HAD ANY THOUGHTS OF KILLING YOURSELF?: NO
1. IN THE PAST MONTH, HAVE YOU WISHED YOU WERE DEAD OR WISHED YOU COULD GO TO SLEEP AND NOT WAKE UP?: NO
6. HAVE YOU EVER DONE ANYTHING, STARTED TO DO ANYTHING, OR PREPARED TO DO ANYTHING TO END YOUR LIFE?: NO

## 2025-04-13 ASSESSMENT — PAIN DESCRIPTION - LOCATION: LOCATION: LEG

## 2025-04-13 ASSESSMENT — PAIN SCALES - GENERAL
PAINLEVEL_OUTOF10: 3
PAINLEVEL_OUTOF10: 7
PAINLEVEL_OUTOF10: 4

## 2025-04-13 ASSESSMENT — PAIN DESCRIPTION - ORIENTATION: ORIENTATION: RIGHT

## 2025-04-13 ASSESSMENT — PAIN - FUNCTIONAL ASSESSMENT
PAIN_FUNCTIONAL_ASSESSMENT: 0-10
PAIN_FUNCTIONAL_ASSESSMENT: 0-10

## 2025-04-13 ASSESSMENT — PAIN DESCRIPTION - PAIN TYPE: TYPE: ACUTE PAIN

## 2025-04-13 ASSESSMENT — LIFESTYLE VARIABLES
EVER FELT BAD OR GUILTY ABOUT YOUR DRINKING: NO
HAVE PEOPLE ANNOYED YOU BY CRITICIZING YOUR DRINKING: NO
HAVE YOU EVER FELT YOU SHOULD CUT DOWN ON YOUR DRINKING: NO
EVER HAD A DRINK FIRST THING IN THE MORNING TO STEADY YOUR NERVES TO GET RID OF A HANGOVER: NO
TOTAL SCORE: 0

## 2025-04-13 ASSESSMENT — PAIN DESCRIPTION - ONSET: ONSET: ONGOING

## 2025-04-13 ASSESSMENT — PAIN DESCRIPTION - FREQUENCY: FREQUENCY: CONSTANT/CONTINUOUS

## 2025-04-13 ASSESSMENT — PAIN DESCRIPTION - DESCRIPTORS: DESCRIPTORS: SHARP;SHOOTING

## 2025-04-13 NOTE — ED PROCEDURE NOTE
Procedure  Orthopaedic Injury Treatment - Lower Extremity    Performed by: Fely Sorto MD  Authorized by: Fely Sorto MD    Consent:     Consent obtained:  Verbal    Alternatives discussed:  No treatment  Universal protocol:     Patient identity confirmed:  Verbally with patient, arm band and hospital-assigned identification number  Location:     Location:  Knee    Knee injury location:  R knee  Pre-procedure details:     Distal perfusion: normal      Range of motion: normal    Procedure details:     Immobilization: knee immobilzer.  Post-procedure details:     Neurological function: normal      Distal perfusion: normal      Range of motion: normal      Procedure completion:  Tolerated               Fely Sorto MD  04/13/25 1324
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
## 2025-04-14 ENCOUNTER — OFFICE VISIT (OUTPATIENT)
Dept: ORTHOPEDIC SURGERY | Facility: CLINIC | Age: 65
End: 2025-04-14
Payer: MEDICARE

## 2025-04-14 DIAGNOSIS — M79.604 PAIN OF RIGHT LOWER EXTREMITY: ICD-10-CM

## 2025-04-14 DIAGNOSIS — M17.10 ARTHRITIS OF KNEE: Primary | ICD-10-CM

## 2025-04-14 PROCEDURE — 99204 OFFICE O/P NEW MOD 45 MIN: CPT | Performed by: ORTHOPAEDIC SURGERY

## 2025-04-14 PROCEDURE — L1812 KO ELASTIC W/JOINTS PRE OTS: HCPCS | Performed by: ORTHOPAEDIC SURGERY

## 2025-04-14 PROCEDURE — 1123F ACP DISCUSS/DSCN MKR DOCD: CPT | Performed by: ORTHOPAEDIC SURGERY

## 2025-04-14 PROCEDURE — 2500000004 HC RX 250 GENERAL PHARMACY W/ HCPCS (ALT 636 FOR OP/ED): Performed by: ORTHOPAEDIC SURGERY

## 2025-04-14 PROCEDURE — 20610 DRAIN/INJ JOINT/BURSA W/O US: CPT | Mod: RT | Performed by: ORTHOPAEDIC SURGERY

## 2025-04-14 PROCEDURE — 99213 OFFICE O/P EST LOW 20 MIN: CPT | Mod: 25 | Performed by: ORTHOPAEDIC SURGERY

## 2025-04-14 RX ORDER — BETAMETHASONE SODIUM PHOSPHATE AND BETAMETHASONE ACETATE 3; 3 MG/ML; MG/ML
12 INJECTION, SUSPENSION INTRA-ARTICULAR; INTRALESIONAL; INTRAMUSCULAR; SOFT TISSUE
Status: COMPLETED | OUTPATIENT
Start: 2025-04-14 | End: 2025-04-14

## 2025-04-14 RX ORDER — LIDOCAINE HYDROCHLORIDE 10 MG/ML
5 INJECTION, SOLUTION INFILTRATION; PERINEURAL
Status: COMPLETED | OUTPATIENT
Start: 2025-04-14 | End: 2025-04-14

## 2025-04-14 RX ADMIN — LIDOCAINE HYDROCHLORIDE 5 ML: 10 INJECTION, SOLUTION INFILTRATION; PERINEURAL at 16:23

## 2025-04-14 RX ADMIN — BETAMETHASONE SODIUM PHOSPHATE AND BETAMETHASONE ACETATE 12 MG: 3; 3 INJECTION, SUSPENSION INTRA-ARTICULAR; INTRALESIONAL; INTRAMUSCULAR at 16:23

## 2025-04-14 NOTE — PROGRESS NOTES
History: Kiara is here for her right knee. Her knee started to bother her on Saturday and by the end of the day she was using a walker. She was walking to her room when she felt a pop in the back of the knee followed by instant tingling in to the foot. She denies any falls or injury. She has been unable to bear weight since then. She has feelings of instability. She has seen Dr. Cochran in the past for her back. She had several back surgeries. She has a known history of gout.  She did go to the ER over the weekend for an ultrasound.    Past medical history: Multiple  Medications: Multiple  Allergies: No known drug allergies    Please refer to the intake H&P regarding the patient's review of systems, family history and social history as was done today    HEENT: Normal  Lungs: Clear to auscultation  Heart: RRR  Abdomen: Soft, nontender  Skin: clear  Extremity: She has some mild fullness posteriorly.  There is tenderness in the popliteal fossa.  She has decent motion again with some soreness.  No numbness tingling today.  Full foot and ankle motion.  No hip pain with rotation.  Mild tenderness medially and laterally in the joint.  Contralateral exam is normal for strength, motion, stability and neurovascular assessment.    Radiographs: X-rays of the right knee at [facility] showed moderate degenerative change.  Duplex ultrasound was negative for DVT but did show a Baker's cyst.    Assessment: Moderate right knee DJD, possible ruptured Baker's cyst    Plan: She is here today as a new patient and we discussed several options for treatment. We gave her a knee brace and will put in an order for physical therapy. She can work on range of motion, strengthening, and modalities. We also gave her a cortisone injection in the right knee today. We will see her back in 4-6 weeks to assess progress. We also discussed potential need for knee arthroplasty in the future.  If having continued pain I would recommend 4 view standing  x-rays at that time as well as potential further imaging.    L Inj/Asp: R knee on 4/14/2025 4:23 PM  Indications: pain  Details: 22 G needle, lateral approach  Medications: 5 mL lidocaine 10 mg/mL (1 %); 12 mg betamethasone acet,sod phos 6 mg/mL  Outcome: tolerated well, no immediate complications  Procedure, treatment alternatives, risks and benefits explained, specific risks discussed. Consent was given by the patient. Immediately prior to procedure a time out was called to verify the correct patient, procedure, equipment, support staff and site/side marked as required. Patient was prepped and draped in the usual sterile fashion.         All questions were answered today with the patient.    Scribe Attestation  By signing my name below, Verna HERNADEZ Scribe   attest that this documentation has been prepared under the direction and in the presence of Medardo Still MD.

## 2025-04-24 ENCOUNTER — EVALUATION (OUTPATIENT)
Dept: PHYSICAL THERAPY | Facility: CLINIC | Age: 65
End: 2025-04-24
Payer: MEDICARE

## 2025-04-24 DIAGNOSIS — M17.10 ARTHRITIS OF KNEE: ICD-10-CM

## 2025-04-24 DIAGNOSIS — M25.561 ACUTE PAIN OF RIGHT KNEE: Primary | ICD-10-CM

## 2025-04-24 PROCEDURE — 97161 PT EVAL LOW COMPLEX 20 MIN: CPT | Mod: GP

## 2025-04-24 PROCEDURE — 97110 THERAPEUTIC EXERCISES: CPT | Mod: GP

## 2025-04-24 ASSESSMENT — ENCOUNTER SYMPTOMS
OCCASIONAL FEELINGS OF UNSTEADINESS: 0
DEPRESSION: 0

## 2025-04-24 NOTE — PROGRESS NOTES
Patient Name: Kiara Ordaz  MRN: [id number]  Time Calculation  Start Time: 1319  Stop Time: 1358  Time Calculation (min): 39 min  PT Evaluation Time Entry  PT Evaluation (Low) Time Entry: 25  PT Therapeutic Procedures Time Entry  Therapeutic Exercise Time Entry: 10                   Current Problem  1. Acute pain of right knee        2. Arthritis of knee  Referral to Physical Therapy        Insurance  MEDICARE/ ANTHEM 2410( $ 518.41 USED ) COPAY 0   (MET) COVERAGE 80/100 OOP 0 ANTHEM TO FOLLOW MEDICARE NO AUTH REQ         Subjective     General: Pt is a 64 y.o. female presenting to clinic with c/o R knee pain. She states she noticed a insidious onset sensation of weakness and pain in the knee, by the end of the day had to utilize a walker to get around. In the night she got up to walk around, felt a pop and immediate pain. She felt she could not put weight through that leg after that.  She went to the ED d/t inability to ambulate, where DVT was ruled out and she followed up OP with Dr. Still. She was given an injection which seems to have helped things. At this point she can walk without her walker or brace at home. Main issue is weakness, pain is mostly resolved. Main restrictions involve prolonged mobility, squatting, stair navigation. Main goal with therapy at this time is to get stronger and avoid recurrence of this issue in the future.       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 No significant redness, warmth, or deformity noted.     Range of Motion    Right      Right knee active extension: -1.      Passive extension: 0      Right knee active flexion: 123.    Left      Left knee active extension: +4.      Left knee active flexion: 127.    Strength    Right      Extension: 5/5.       Flexion: 4+/5.     Left      Extension: 5/5.       Flexion: 5/5.     Hip Musculoskeletal Exam    Strength    Right      Flexion: 4+/5.       Internal rotation: 4+/5.       External rotation: 4+/5.       Adduction: 4+/5.       Abduction: 4/5.     Left      Flexion: 4+/5.       Internal rotation: 4+/5.       External rotation: 4+/5.       Adduction: 4+/5.       Abduction: 4/5.     Strength additional comments: Abd/add in hooklying        Varus/valgus stress testing:  neg  Lachmans:  neg   Post/ant drawer: neg   Mcmurrays:  neg     Pt ambulates with FWW, forward flexed trunk with reduced step height/length. Does have step through pattern, no significant antalgia noted     Outcome Measures:  LEFS:  28     Treatment: See HEP below    EDUCATION/HEP:  Pt educated on theorized etiology of symptoms, POC, and overall progression of treatment. Pt in agreement.     Access Code: [email or web address]  URL: [email or web address]  Date: 04/24/2025  Prepared by: Lazaro Oquendo    Exercises  - Sit to Stand  - 1-2 x daily - 7 x weekly - 2-3 sets - 10-15 reps  - Seated Hamstring Stretch  - 1-2 x daily - 7 x weekly - 1 sets - 5-6 reps - 15-20 hold  - Standing Hip Abduction with Counter Support  - 1-2 x daily - 7 x weekly - 2 sets - 10 reps  - Standing Hip Extension with Counter Support  - 1-2 x daily - 7 x weekly - 2 sets - 10 reps  - Standing Knee Flexion with Counter Support  - 1-2 x daily - 7 x weekly - 2 sets - 10 reps    Assessment:     Pt is a 64 y.o. female coming to clinic with primary complaint of  acute onset knee pain that occurred ~2 weeks ago. On exam is demonstrating  no significant deficits, mainly mild weakness at the knee and hip. Pt is primarily looking for prophylactic care to avoid any recurrence as she has seen a reduction in ambulatory capacity. She is currently using a walker which is not baseline for her. Pt's deficits have lead to functional impairments with reduced ability to perform home/yard care tasks, leisure activities, and general mobility like walking, squatting, and stairs. Recommend skilled PT to address the aforementioned deficits and allow pt to restore PLOF and maximize functional capacity. Anticipate good prognosis given spontaneous recovery with regards to her pain, positive attitude towards therapy, and support system.       Clinical Presentation: Stable     Level of Complexity: Low    Goals:      Pt to be IND with HEP  2. Pt R knee MMT to 5/5 to demonstrate improved ability to tolerate prolonged mobility, stair navigation  3. Pt to be able to ambulate community level distances over various types of terrain/surfaces/gradients with LRAD YOLI  4. Pt to self report LEFS score of greater than or equal to 37 to demonstrate statistically significant improvement in function.   5. Orion SLS to be 10s or greater   6. Orion hip abd strength to 4+/5 to demo improved stability in stance phase of gait    Plan  1x/week for 4 visits     General strength and mobility, working on endurance to improve ambulatory capacity  Planned interventions include:  aquatic therapy, biofeedback, cryotherapy, dry needling, edema control, education/instruction, electrical stimulation, gait training, home program, hot pack, kinesiotaping, manual therapy, neuromuscular re-education, self care/home management, therapeutic activities, therapeutic exercises, ultrasound and vasopneumatic device w/ cold.

## 2025-05-02 ENCOUNTER — TREATMENT (OUTPATIENT)
Dept: PHYSICAL THERAPY | Facility: CLINIC | Age: 65
End: 2025-05-02
Payer: MEDICARE

## 2025-05-02 DIAGNOSIS — M17.10 ARTHRITIS OF KNEE: ICD-10-CM

## 2025-05-02 DIAGNOSIS — M25.561 ACUTE PAIN OF RIGHT KNEE: Primary | ICD-10-CM

## 2025-05-02 PROCEDURE — 97110 THERAPEUTIC EXERCISES: CPT | Mod: GP

## 2025-05-02 NOTE — PROGRESS NOTES
Patient Name: Kiara Ordaz  MRN: [id number]  Time Calculation  Start Time: 1001  Stop Time: 1040  Time Calculation (min): 39 min     PT Therapeutic Procedures Time Entry  Therapeutic Exercise Time Entry: 39                     Current Problem  1. Acute pain of right knee        2. Arthritis of knee            Insurance  MEDICARE/ ANTHEM 2410( $ 518.41 USED ) COPAY 0   (MET) COVERAGE 80/100 OOP 0 ANTHEM TO FOLLOW MEDICARE NO AUTH REQ     1/4     Subjective     General  Pt reports she has not been wearing her brace as much. Was able to walk down the block and back without much issue. Saw Dr. Millan , has scheduled some lower throacic injections for pain in her back. Has been able to get to all exercises at some point but not as consistently as she would like.   Precautions    Pain  1-2/10    Objective     Treatments:    Seated HS stretch; 5x10s   Supine regina stretch:  5x10s   Supine bridge:  x12   Seated LAQ:  10x3s 5#  Standing HS curl: x10  5#   YTB side stepping:  x 15 da   YTB standing SLR :  2x10 da       OP EDUCATION/HEP:    Access Code: [email or web address]  URL: [email or web address]  Date: 05/02/2025  Prepared by: Lazaro Oquendo    Exercises  - Side Stepping with Resistance at Ankles and Counter Support  - 1 x daily - 3-4 x weekly - 1-2 sets - 10-15 reps  - Standing Hip Flexion with Resistance Loop  - 1 x daily - 3-4 x weekly - 1-2 sets - 10-15 reps    Assessment   Pt with overall good tolerance to session. Endurance is generally decreased and she requires frequent seated rest breaks between sets/exercises. Continued with activities from her HEP to assess tolerance, introduced TB hip strengthening to both session and HEP. As mentioned pt was generally fatigued with this but denied any significant pain or discomfort throughout. Will continue to assess symptom response in future sessions, recommend continued PT to facilitate goal achievement and restoration of PLOF with ADLs/IADLs.     Plan      Continue per POC. Progress HEP more NV, increase resistance as tolerated

## 2025-05-05 DIAGNOSIS — E78.2 MIXED HYPERLIPIDEMIA: ICD-10-CM

## 2025-05-05 DIAGNOSIS — E03.9 ACQUIRED HYPOTHYROIDISM: ICD-10-CM

## 2025-05-05 DIAGNOSIS — I10 ESSENTIAL HYPERTENSION: ICD-10-CM

## 2025-05-09 ENCOUNTER — TREATMENT (OUTPATIENT)
Dept: PHYSICAL THERAPY | Facility: CLINIC | Age: 65
End: 2025-05-09
Payer: MEDICARE

## 2025-05-09 DIAGNOSIS — M17.10 ARTHRITIS OF KNEE: ICD-10-CM

## 2025-05-09 DIAGNOSIS — M25.561 ACUTE PAIN OF RIGHT KNEE: Primary | ICD-10-CM

## 2025-05-09 PROCEDURE — 97110 THERAPEUTIC EXERCISES: CPT | Mod: GP

## 2025-05-09 NOTE — PROGRESS NOTES
Patient Name: Kiara Ordaz  MRN: [id number]  Time Calculation  Start Time: 1046  Stop Time: 1125  Time Calculation (min): 39 min     PT Therapeutic Procedures Time Entry  Therapeutic Exercise Time Entry: 39                     Current Problem  1. Acute pain of right knee        2. Arthritis of knee            Insurance  MEDICARE/ ANTHEM 2410( $ 518.41 USED ) COPAY 0   (MET) COVERAGE 80/100 OOP 0 ANTHEM TO FOLLOW MEDICARE NO AUTH REQ      2/4   Subjective     General  Pt reports a regression of symptoms over the last week, has had multiple instances of buckling. Denies any falls from this. Feels pain has increased behind the knee.   Precautions    Pain  3/10    Objective     Treatments:    Seated HS stretch; 5x10s   Supine SAQ:  [id number] 3#   Supine SLR:  2x5 RLE   Hooklying hip abduction:  x15 GTB   Hooklying hip adduction squeeze:  10x5s   Long sitting hip adduction squeeze:  10x5s   Elevated STS 2x10-15 (7.5# KB 2nd set)   Standing TKE with PT TB resistance  :  x20, GTB     OP EDUCATION/HEP:  Encouraged pt to perform HEP even if its more related to stretching vs strengthening. Advised that if there is lack of symptom improvement moving forward we will consider referring back to Dr. Still for further imaging.     Assessment   Pt with fair tolerance to session today. Overall reduced tolerance to activity today vs previous visits. Pt with increased pain in the knee and some instances of buckling over the last few days. None noted during session, able to generally complete all given activities. Pain most significant with SLRs, pt unable to complete a third set. Otherwise did well with lighter hip and knee strengthening. No changes to HEP as pt has seen some symptom aggravation, still encouraged her to perform previously given exercises to her tolerance. Will continue to monitor symptoms moving forward. Recommend continued PT to facilitate goal achievement and restoration of PLOF with ADLs/IADLs.     Plan      Continue per POC. Progress ther-ex to symptom tolerance

## 2025-05-13 LAB
ALBUMIN SERPL-MCNC: 4.5 G/DL (ref 3.6–5.1)
ALP SERPL-CCNC: 72 U/L (ref 37–153)
ALT SERPL-CCNC: 17 U/L (ref 6–29)
ANION GAP SERPL CALCULATED.4IONS-SCNC: 12 MMOL/L (CALC) (ref 7–17)
AST SERPL-CCNC: 17 U/L (ref 10–35)
BASOPHILS # BLD AUTO: 49 CELLS/UL (ref 0–200)
BASOPHILS NFR BLD AUTO: 0.9 %
BILIRUB SERPL-MCNC: 0.8 MG/DL (ref 0.2–1.2)
BUN SERPL-MCNC: 31 MG/DL (ref 7–25)
CALCIUM SERPL-MCNC: 9.6 MG/DL (ref 8.6–10.4)
CHLORIDE SERPL-SCNC: 103 MMOL/L (ref 98–110)
CHOLEST SERPL-MCNC: 171 MG/DL
CHOLEST/HDLC SERPL: 3.6 (CALC)
CO2 SERPL-SCNC: 26 MMOL/L (ref 20–32)
CREAT SERPL-MCNC: 1.42 MG/DL (ref 0.5–1.05)
EGFRCR SERPLBLD CKD-EPI 2021: 41 ML/MIN/1.73M2
EOSINOPHIL # BLD AUTO: 151 CELLS/UL (ref 15–500)
EOSINOPHIL NFR BLD AUTO: 2.8 %
ERYTHROCYTE [DISTWIDTH] IN BLOOD BY AUTOMATED COUNT: 13.7 % (ref 11–15)
GLUCOSE SERPL-MCNC: 112 MG/DL (ref 65–99)
HCT VFR BLD AUTO: 40.7 % (ref 35–45)
HDLC SERPL-MCNC: 47 MG/DL
HGB BLD-MCNC: 13.5 G/DL (ref 11.7–15.5)
LDLC SERPL CALC-MCNC: 93 MG/DL (CALC)
LYMPHOCYTES # BLD AUTO: 1766 CELLS/UL (ref 850–3900)
LYMPHOCYTES NFR BLD AUTO: 32.7 %
MCH RBC QN AUTO: 31.9 PG (ref 27–33)
MCHC RBC AUTO-ENTMCNC: 33.2 G/DL (ref 32–36)
MCV RBC AUTO: 96.2 FL (ref 80–100)
MONOCYTES # BLD AUTO: 319 CELLS/UL (ref 200–950)
MONOCYTES NFR BLD AUTO: 5.9 %
NEUTROPHILS # BLD AUTO: 3116 CELLS/UL (ref 1500–7800)
NEUTROPHILS NFR BLD AUTO: 57.7 %
NONHDLC SERPL-MCNC: 124 MG/DL (CALC)
PLATELET # BLD AUTO: 238 THOUSAND/UL (ref 140–400)
PMV BLD REES-ECKER: 11.1 FL (ref 7.5–12.5)
POTASSIUM SERPL-SCNC: 4.3 MMOL/L (ref 3.5–5.3)
PROT SERPL-MCNC: 7.2 G/DL (ref 6.1–8.1)
RBC # BLD AUTO: 4.23 MILLION/UL (ref 3.8–5.1)
SODIUM SERPL-SCNC: 141 MMOL/L (ref 135–146)
T4 FREE SERPL-MCNC: 1.2 NG/DL (ref 0.8–1.8)
TRIGL SERPL-MCNC: 221 MG/DL
TSH SERPL-ACNC: 1.37 MIU/L (ref 0.4–4.5)
WBC # BLD AUTO: 5.4 THOUSAND/UL (ref 3.8–10.8)

## 2025-05-14 ENCOUNTER — APPOINTMENT (OUTPATIENT)
Dept: PRIMARY CARE | Facility: CLINIC | Age: 65
End: 2025-05-14
Payer: COMMERCIAL

## 2025-05-14 VITALS
TEMPERATURE: 97.7 F | SYSTOLIC BLOOD PRESSURE: 118 MMHG | OXYGEN SATURATION: 94 % | RESPIRATION RATE: 15 BRPM | DIASTOLIC BLOOD PRESSURE: 68 MMHG | BODY MASS INDEX: 42.34 KG/M2 | WEIGHT: 248 LBS | HEART RATE: 61 BPM | HEIGHT: 64 IN

## 2025-05-14 DIAGNOSIS — E87.6 HYPOKALEMIA: ICD-10-CM

## 2025-05-14 DIAGNOSIS — K21.9 GASTROESOPHAGEAL REFLUX DISEASE WITHOUT ESOPHAGITIS: ICD-10-CM

## 2025-05-14 DIAGNOSIS — E03.9 ACQUIRED HYPOTHYROIDISM: ICD-10-CM

## 2025-05-14 DIAGNOSIS — M47.16 LUMBAR SPONDYLOSIS WITH MYELOPATHY: ICD-10-CM

## 2025-05-14 DIAGNOSIS — I10 ESSENTIAL HYPERTENSION: Primary | ICD-10-CM

## 2025-05-14 DIAGNOSIS — E78.2 MIXED HYPERLIPIDEMIA: ICD-10-CM

## 2025-05-14 DIAGNOSIS — M1A.00X0 CHRONIC IDIOPATHIC GOUT: ICD-10-CM

## 2025-05-14 DIAGNOSIS — E66.01 MORBID OBESITY WITH BMI OF 40.0-44.9, ADULT (MULTI): ICD-10-CM

## 2025-05-14 DIAGNOSIS — G60.9 IDIOPATHIC PERIPHERAL NEUROPATHY: ICD-10-CM

## 2025-05-14 PROBLEM — R20.0 NUMBNESS OF FACE: Status: RESOLVED | Noted: 2023-08-15 | Resolved: 2025-05-14

## 2025-05-14 PROCEDURE — 1036F TOBACCO NON-USER: CPT | Performed by: FAMILY MEDICINE

## 2025-05-14 PROCEDURE — 99214 OFFICE O/P EST MOD 30 MIN: CPT | Performed by: FAMILY MEDICINE

## 2025-05-14 PROCEDURE — 1159F MED LIST DOCD IN RCRD: CPT | Performed by: FAMILY MEDICINE

## 2025-05-14 PROCEDURE — 3074F SYST BP LT 130 MM HG: CPT | Performed by: FAMILY MEDICINE

## 2025-05-14 PROCEDURE — 1160F RVW MEDS BY RX/DR IN RCRD: CPT | Performed by: FAMILY MEDICINE

## 2025-05-14 PROCEDURE — G2211 COMPLEX E/M VISIT ADD ON: HCPCS | Performed by: FAMILY MEDICINE

## 2025-05-14 PROCEDURE — 3008F BODY MASS INDEX DOCD: CPT | Performed by: FAMILY MEDICINE

## 2025-05-14 PROCEDURE — 3078F DIAST BP <80 MM HG: CPT | Performed by: FAMILY MEDICINE

## 2025-05-14 RX ORDER — LEVOTHYROXINE SODIUM 50 UG/1
50 TABLET ORAL DAILY
Qty: 90 TABLET | Refills: 1 | Status: SHIPPED | OUTPATIENT
Start: 2025-05-14

## 2025-05-14 RX ORDER — HYDROCHLOROTHIAZIDE 25 MG/1
25 TABLET ORAL DAILY
Qty: 90 TABLET | Refills: 1 | Status: SHIPPED | OUTPATIENT
Start: 2025-05-14

## 2025-05-14 RX ORDER — MELOXICAM 15 MG/1
15 TABLET ORAL DAILY
Qty: 90 TABLET | Refills: 1 | Status: SHIPPED | OUTPATIENT
Start: 2025-05-14

## 2025-05-14 RX ORDER — ALLOPURINOL 300 MG/1
300 TABLET ORAL DAILY
Qty: 90 TABLET | Refills: 1 | Status: SHIPPED | OUTPATIENT
Start: 2025-05-14

## 2025-05-14 RX ORDER — PANTOPRAZOLE SODIUM 40 MG/1
40 TABLET, DELAYED RELEASE ORAL DAILY
Qty: 90 TABLET | Refills: 1 | Status: SHIPPED | OUTPATIENT
Start: 2025-05-14

## 2025-05-14 RX ORDER — LISINOPRIL 5 MG/1
5 TABLET ORAL DAILY
Qty: 90 TABLET | Refills: 1 | Status: SHIPPED | OUTPATIENT
Start: 2025-05-14

## 2025-05-14 RX ORDER — POTASSIUM CHLORIDE 750 MG/1
10 TABLET, EXTENDED RELEASE ORAL 2 TIMES DAILY
Qty: 180 TABLET | Refills: 1 | Status: SHIPPED | OUTPATIENT
Start: 2025-05-14

## 2025-05-14 RX ORDER — ATORVASTATIN CALCIUM 80 MG/1
80 TABLET, FILM COATED ORAL NIGHTLY
Qty: 90 TABLET | Refills: 1 | Status: SHIPPED | OUTPATIENT
Start: 2025-05-14

## 2025-05-14 RX ORDER — DULOXETIN HYDROCHLORIDE 60 MG/1
60 CAPSULE, DELAYED RELEASE ORAL DAILY
Qty: 90 CAPSULE | Refills: 1 | Status: SHIPPED | OUTPATIENT
Start: 2025-05-14

## 2025-05-14 ASSESSMENT — ENCOUNTER SYMPTOMS
NUMBNESS: 0
COUGH: 0
CHILLS: 0
FREQUENCY: 0
VOMITING: 0
POLYPHAGIA: 0
PALPITATIONS: 0
DIARRHEA: 0
CONSTIPATION: 0
SORE THROAT: 0
HEMATURIA: 0
HEADACHES: 0
SHORTNESS OF BREATH: 0
ABDOMINAL PAIN: 0
WHEEZING: 0
BACK PAIN: 1
TREMORS: 0
DYSURIA: 0
DIZZINESS: 0
FEVER: 0
POLYDIPSIA: 0
RHINORRHEA: 0

## 2025-05-14 NOTE — LETTER
May 14, 2025     Patient: Kiara Ordaz   YOB: 1960   Date of Visit: 5/14/2025       To Whom It May Concern:    It is my medical opinion that Ms. Ordaz requires a disability parking placard for the following reasons:  She cannot walk 200 feet without stopping to rest.  Duration of need: 5 years Expires: 5/13/2030.    If you have any questions or concerns, please don't hesitate to call.         Sincerely,        Niall Miranda MD        CC: No Recipients

## 2025-05-14 NOTE — PROGRESS NOTES
"Subjective   Patient ID: Kiara Ordaz is a 64 y.o. female who presents for Follow-up (Hypertension, Hyperlipidemia, Hypothyroidism and labs).    Patient is here for follow-up on hypertension and hyperlipidemia. She has no chest pain, dyspnea, exertional chest pressure/discomfort, near-syncope, orthopnea, palpitations, paroxysmal nocturnal dyspnea, and syncope. Taking her medication regularly with no side effects.    She presents for follow up of hypothyroidism. Current symptoms: heat intolerance. She has no change in energy level, diarrhea, nervousness, palpitations, or weight changes.        Review of Systems   Constitutional:  Negative for chills and fever.   HENT:  Negative for congestion, ear pain, nosebleeds, rhinorrhea and sore throat.    Respiratory:  Negative for cough, shortness of breath and wheezing.    Cardiovascular:  Negative for chest pain, palpitations and leg swelling.   Gastrointestinal:  Negative for abdominal pain, constipation, diarrhea and vomiting.   Endocrine: Negative for cold intolerance, heat intolerance, polydipsia, polyphagia and polyuria.   Genitourinary:  Negative for dysuria, frequency and hematuria.   Musculoskeletal:  Positive for back pain.   Neurological:  Negative for dizziness, tremors, numbness and headaches.       Objective   /68   Pulse 61   Temp 36.5 °C (97.7 °F)   Resp 15   Ht 1.626 m (5' 4\")   Wt 112 kg (248 lb)   SpO2 94%   BMI 42.57 kg/m²     Physical Exam  Constitutional:       General: She is not in acute distress.     Appearance: Normal appearance.   HENT:      Head: Normocephalic and atraumatic.      Mouth/Throat:      Mouth: Mucous membranes are moist.      Pharynx: Oropharynx is clear. No oropharyngeal exudate or posterior oropharyngeal erythema.   Eyes:      General: No scleral icterus.     Extraocular Movements: Extraocular movements intact.      Pupils: Pupils are equal, round, and reactive to light.   Cardiovascular:      Rate and Rhythm: " Normal rate and regular rhythm.      Pulses: Normal pulses.      Heart sounds: No murmur heard.     No friction rub. No gallop.   Pulmonary:      Effort: Pulmonary effort is normal.      Breath sounds: No wheezing, rhonchi or rales.   Skin:     General: Skin is warm.      Coloration: Skin is not jaundiced or pale.      Findings: No erythema or rash.   Neurological:      General: No focal deficit present.      Mental Status: She is alert and oriented to person, place, and time.      Cranial Nerves: No cranial nerve deficit.      Sensory: No sensory deficit.      Coordination: Coordination normal.      Gait: Gait normal.         Orders Only on 05/05/2025   Component Date Value Ref Range Status    WHITE BLOOD CELL COUNT 05/12/2025 5.4  3.8 - 10.8 Thousand/uL Final    RED BLOOD CELL COUNT 05/12/2025 4.23  3.80 - 5.10 Million/uL Final    HEMOGLOBIN 05/12/2025 13.5  11.7 - 15.5 g/dL Final    HEMATOCRIT 05/12/2025 40.7  35.0 - 45.0 % Final    MCV 05/12/2025 96.2  80.0 - 100.0 fL Final    MCH 05/12/2025 31.9  27.0 - 33.0 pg Final    MCHC 05/12/2025 33.2  32.0 - 36.0 g/dL Final    Comment: For adults, a slight decrease in the calculated MCHC  value (in the range of 30 to 32 g/dL) is most likely  not clinically significant; however, it should be  interpreted with caution in correlation with other  red cell parameters and the patient's clinical  condition.      RDW 05/12/2025 13.7  11.0 - 15.0 % Final    PLATELET COUNT 05/12/2025 238  140 - 400 Thousand/uL Final    MPV 05/12/2025 11.1  7.5 - 12.5 fL Final    ABSOLUTE NEUTROPHILS 05/12/2025 3,116  1,500 - 7,800 cells/uL Final    ABSOLUTE LYMPHOCYTES 05/12/2025 1,766  850 - 3,900 cells/uL Final    ABSOLUTE MONOCYTES 05/12/2025 319  200 - 950 cells/uL Final    ABSOLUTE EOSINOPHILS 05/12/2025 151  15 - 500 cells/uL Final    ABSOLUTE BASOPHILS 05/12/2025 49  0 - 200 cells/uL Final    NEUTROPHILS 05/12/2025 57.7  % Final    LYMPHOCYTES 05/12/2025 32.7  % Final    MONOCYTES  05/12/2025 5.9  % Final    EOSINOPHILS 05/12/2025 2.8  % Final    BASOPHILS 05/12/2025 0.9  % Final    GLUCOSE 05/12/2025 112 (H)  65 - 99 mg/dL Final    Comment:               Fasting reference interval     For someone without known diabetes, a glucose value  between 100 and 125 mg/dL is consistent with  prediabetes and should be confirmed with a  follow-up test.         UREA NITROGEN (BUN) 05/12/2025 31 (H)  7 - 25 mg/dL Final    CREATININE 05/12/2025 1.42 (H)  0.50 - 1.05 mg/dL Final    EGFR 05/12/2025 41 (L)  > OR = 60 mL/min/1.73m2 Final    SODIUM 05/12/2025 141  135 - 146 mmol/L Final    POTASSIUM 05/12/2025 4.3  3.5 - 5.3 mmol/L Final    CHLORIDE 05/12/2025 103  98 - 110 mmol/L Final    CARBON DIOXIDE 05/12/2025 26  20 - 32 mmol/L Final    ELECTROLYTE BALANCE 05/12/2025 12  7 - 17 mmol/L (calc) Final    CALCIUM 05/12/2025 9.6  8.6 - 10.4 mg/dL Final    PROTEIN, TOTAL 05/12/2025 7.2  6.1 - 8.1 g/dL Final    ALBUMIN 05/12/2025 4.5  3.6 - 5.1 g/dL Final    BILIRUBIN, TOTAL 05/12/2025 0.8  0.2 - 1.2 mg/dL Final    ALKALINE PHOSPHATASE 05/12/2025 72  37 - 153 U/L Final    AST 05/12/2025 17  10 - 35 U/L Final    ALT 05/12/2025 17  6 - 29 U/L Final    CHOLESTEROL, TOTAL 05/12/2025 171  <200 mg/dL Final    HDL CHOLESTEROL 05/12/2025 47 (L)  > OR = 50 mg/dL Final    TRIGLYCERIDES 05/12/2025 221 (H)  <150 mg/dL Final    Comment:    If a non-fasting specimen was collected, consider  repeat triglyceride testing on a fasting specimen  if clinically indicated.   Gene et al. J. of Clin. Lipidol. 2015;9:129-169.         LDL-CHOLESTEROL 05/12/2025 93  mg/dL (calc) Final    Comment: Reference range: <100     Desirable range <100 mg/dL for primary prevention;    <70 mg/dL for patients with CHD or diabetic patients   with > or = 2 CHD risk factors.     LDL-C is now calculated using the Ramses-Wheat   calculation, which is a validated novel method providing   better accuracy than the Friedewald equation in the   estimation  "of LDL-C.   Ramses SS et al. PATRICK. 2013;310(19): 3508-1707   (http://education.Experifun.bookjam/faq/KFJ010)      CHOL/HDLC RATIO 05/12/2025 3.6  <5.0 (calc) Final    NON HDL CHOLESTEROL 05/12/2025 124  <130 mg/dL (calc) Final    Comment: For patients with diabetes plus 1 major ASCVD risk   factor, treating to a non-HDL-C goal of <100 mg/dL   (LDL-C of <70 mg/dL) is considered a therapeutic   option.      TSH 05/12/2025 1.37  0.40 - 4.50 mIU/L Final    T4, FREE 05/12/2025 1.2  0.8 - 1.8 ng/dL Final       Assessment/Plan   Problem List Items Addressed This Visit       Acquired hypothyroidism    Well-controlled, continue current medications and management.         Relevant Medications    levothyroxine (Synthroid, Levoxyl) 50 mcg tablet    Other Relevant Orders    Thyroid Stimulating Hormone    Thyroxine, Free    Follow Up In [facility] - PCP    Chronic idiopathic gout    Well-controlled, continue current medications and management.         Relevant Medications    allopurinol (Zyloprim) 300 mg tablet    Essential hypertension - Primary    Well-controlled, continue current medications and management.  Dietary sodium restriction.  Regular aerobic exercise program is recommended to help achieve and maintain normal body weight, fitness and improve lipid balance. .  Dietary changes: Increase soluble fiber  Plant sterols 2grams per day (e.g. Benecol)  Reduce saturated fat, \"trans\" monounsaturated fatty acids, and cholesterol         Relevant Medications    hydroCHLOROthiazide (HYDRODiuril) 25 mg tablet    lisinopril 5 mg tablet    Other Relevant Orders    Comprehensive Metabolic Panel    Lipid Panel    Thyroid Stimulating Hormone    Follow Up In [facility] - PCP    Gastroesophageal reflux disease    Well-controlled, continue current medications and management.         Relevant Medications    pantoprazole (ProtoNix) 40 mg EC tablet    Hypokalemia    Well-controlled, continue current medications and " management.         Relevant Medications    potassium chloride CR 10 mEq ER tablet    Idiopathic peripheral neuropathy    Stable, continue current medications and management.         Relevant Medications    DULoxetine (Cymbalta) 60 mg DR capsule    Lumbar spondylosis with myelopathy    Continue current medications and management.         Relevant Medications    meloxicam (Mobic) 15 mg tablet    Mixed hyperlipidemia    The nature of cardiac risk has been fully discussed with this patient. Discussed cardiovascular risk analysis and appropriate diet with the need for lifelong measures to reduce the risk. A regular exercise program is recommended to help achieve and maintain normal body weight, fitness and improve lipid balance. Patient education provided. They understand and agree with this course of treatment. They will return with new or worsening symptoms. Patient instructed to remain current with appropriate annual health maintenance.          Relevant Medications    atorvastatin (Lipitor) 80 mg tablet    Other Relevant Orders    Comprehensive Metabolic Panel    Lipid Panel    Thyroid Stimulating Hormone    Follow Up In [facility] - PCP    Morbid obesity with BMI of 40.0-44.9, adult (Multi)    Continue decrease calorie diet and not more than 1500 calorie per day diet and low-fat diet.  Continue with regular exercise program.  We advised exercise at least 5 days a week for at least 45 minutes and also a minimum of 10,000 steps a day.  The detrimental effects of obesity on health were discussed.          Scribe Attestation  By signing my name below, I, Becka hO   attest that this documentation has been prepared under the direction and in the presence of Niall Miranda MD.

## 2025-05-14 NOTE — PATIENT INSTRUCTIONS
BMI was above normal measurement. Current weight: 112 kg (248 lb)  Weight change since last visit (-) denotes wt loss 3 lbs   Weight loss needed to achieve BMI 25: 102.7 Lbs  Weight loss needed to achieve BMI 30: 73.6 Lbs  Provided instructions on dietary changes  Advised to Increase physical activity.    Ways to Help Prevent Falls at Home    Quick Tips   ? Ask for help if you need it. Most people want to help!   ? Get up slowly after sitting or laying down   ? Wear a medical alert device or keep cell phone in your pocket   ? Use night lights, especially areas near a bathroom   ? Keep the items you use often within reach on a small stool or end table   ? Use an assistive device such as walker or cane, as directed by provider/physical therapy   ? Use a non-slip mat and grab bars in your bathroom. Look for home health sections for best options     Other Areas to Focus On   ? Exercise and nutrition: Regular exercise or taking a falls prevention class are great ways improve strength and balance. Don’t forget to stay hydrated and bring a snack!   ? Medicine side effects: Some medicines can make you sleepy or dizzy, which could cause a fall. Ask your healthcare provider about the side effects your medicines could cause. Be sure to let them know if you take any vitamins or supplements as well.   ? Tripping hazards: Remove items you could trip on, such as loose mats, rugs, cords, and clutter. Wear closed toe shoes with rubber soles.   ? Health and wellness: Get regular checkups with your healthcare provider, plus routine vision and hearing screenings. Talk with your healthcare provider about:   o Your medicines and the possible side effects - bring them in a bag if that is easier!   o Problems with balance or feeling dizzy   o Ways to promote bone health, such as Vitamin D and calcium supplements   o Questions or concerns about falling     *Ask your healthcare team if you have questions     [facility], 2022

## 2025-05-16 ENCOUNTER — APPOINTMENT (OUTPATIENT)
Dept: PHYSICAL THERAPY | Facility: CLINIC | Age: 65
End: 2025-05-16
Payer: MEDICARE

## 2025-05-16 NOTE — PROGRESS NOTES
Patient Name: Kiara Ordaz  MRN: [id number]                              Current Problem  No diagnosis found.      Insurance  MEDICARE/ ANTHEM 2410( $ 518.41 USED ) COPAY 0   (MET) COVERAGE 80/100 OOP 0 ANTHEM TO FOLLOW MEDICARE NO AUTH REQ      2/4   Subjective     General  Pt reports a regression of symptoms over the last week, has had multiple instances of buckling. Denies any falls from this. Feels pain has increased behind the knee.   Precautions    Pain  3/10    Objective     Treatments:    Seated HS stretch; 5x10s   Supine SAQ:  [id number] 3#   Supine SLR:  2x5 RLE   Hooklying hip abduction:  x15 GTB   Hooklying hip adduction squeeze:  10x5s   Long sitting hip adduction squeeze:  10x5s   Elevated STS 2x10-15 (7.5# KB 2nd set)   Standing TKE with PT TB resistance  :  x20, GTB     OP EDUCATION/HEP:  Encouraged pt to perform HEP even if its more related to stretching vs strengthening. Advised that if there is lack of symptom improvement moving forward we will consider referring back to Dr. Still for further imaging.     Assessment   Pt with fair tolerance to session today. Overall reduced tolerance to activity today vs previous visits. Pt with increased pain in the knee and some instances of buckling over the last few days. None noted during session, able to generally complete all given activities. Pain most significant with SLRs, pt unable to complete a third set. Otherwise did well with lighter hip and knee strengthening. No changes to HEP as pt has seen some symptom aggravation, still encouraged her to perform previously given exercises to her tolerance. Will continue to monitor symptoms moving forward. Recommend continued PT to facilitate goal achievement and restoration of PLOF with ADLs/IADLs.     Plan     Continue per POC. Progress ther-ex to symptom tolerance

## 2025-05-23 ENCOUNTER — TREATMENT (OUTPATIENT)
Dept: PHYSICAL THERAPY | Facility: CLINIC | Age: 65
End: 2025-05-23
Payer: MEDICARE

## 2025-05-23 DIAGNOSIS — M25.561 ACUTE PAIN OF RIGHT KNEE: Primary | ICD-10-CM

## 2025-05-23 DIAGNOSIS — M17.10 ARTHRITIS OF KNEE: ICD-10-CM

## 2025-05-23 PROCEDURE — 97110 THERAPEUTIC EXERCISES: CPT | Mod: GP

## 2025-05-23 NOTE — PROGRESS NOTES
Patient Name: Kiara Ordaz  MRN: [id number]  Time Calculation  Start Time: 1043  Stop Time: 1111  Time Calculation (min): 28 min     PT Therapeutic Procedures Time Entry  Therapeutic Exercise Time Entry: 28                     Current Problem  1. Acute pain of right knee        2. Arthritis of knee              Insurance    MEDICARE/ ANTHEM 2410( $ 518.41 USED ) COPAY 0   (MET) COVERAGE 80/100 OOP 0 ANTHEM TO FOLLOW MEDICARE NO AUTH REQ      3/4     Subjective     General  Pt has been walking without her rollater over the last week. Had an instance where she felt a similar popping behind her knee with searing pain, though did not persist as long as previously. Has been able to go up and down stairs, albeit slowly and with abundance of caution.     Precautions    Pain  1/10    Objective     LEFS:  23     Knee Musculoskeletal Exam    Range of Motion    Right      Active extension: 0      Right knee active flexion: 118.    Left      Left knee active extension: +5.      Left knee active flexion: 127.    Strength    Right      Extension: 4+/5. Extension is affected by pain.       Flexion: 4+/5.     Left      Extension: 5/5.       Flexion: 4+/5.   Hip Musculoskeletal Exam    Strength    Right      Flexion: 4-/5. Flexion is affected by pain.       Adduction: 4+/5.       Abduction: 4+/5.     Left      Flexion: 4+/5.       Adduction: 4+/5.       Abduction: 4+/5.     Strength additional comments: Add/abd in hooklying      SLS:  3s LLE   2s RLE (p!)     Treatments:  Goal assessment/re-check:  28'    OP EDUCATION/HEP:  Discussed progress towards goals, continuation of care. Pt and PT in mutual agreement to place her on hold until she sees Dr. Still again on 6/2.        Assessment   Re-check performed this date. At this time pt has demonstrated slight improvements in hip strength, ability to ambulate without a device. She still demonstrates significant gait abnormality and grossly has pain with prolonged  standing/walking. She still has weakness in the R knee, and has pain with SLS. She also demonstrates reduced knee flexion vs initial evaluation. Overall she has seen mixed results with therapy to this point. After discussion with there is mutual agreement to place her on hold while she follows up with Dr. Still to discuss potential MRI/further imaging. If cleared will see her back for more therapy. Encouraged her to reach out with any future questions or concerns.      Pt to be IND with HEP (MET, still progressive)   2. Pt R knee MMT to 5/5 to demonstrate improved ability to tolerate prolonged mobility, stair navigation (limited progress)   3. Pt to be able to ambulate community level distances over various types of terrain/surfaces/gradients with LRAD YOLI (progressing, pt able to ambulate without device, though notably antalgic with significant abnormalities )  4. Pt to self report LEFS score of greater than or equal to 37 to demonstrate statistically significant improvement in function. (Regressed)   5. Orion SLS to be 10s or greater   6. Orion hip abd strength to 4+/5 to demo improved stability in stance phase of gait (MET)     Plan     On hold until she sees Dr. Still 6/2

## 2025-05-30 NOTE — PROGRESS NOTES
History: Kiara is here for her right knee. We gave her a cortisone injection into the right knee on 4/14/25. She did get some improvement with the injection. It does continue to bother her and she describes a popping sensation in the knee. She does take Tylenol and meloxicam for pain.     Past medical history: Multiple  Medications: Multiple  Allergies: No known drug allergies    Please refer to the intake H&P regarding the patient's review of systems, family history and social history as was done today    HEENT: Normal  Lungs: Clear to auscultation  Heart: RRR  Abdomen: Soft, nontender  Skin: clear  Extremity: She has some diffuse tenderness superiorly as well as posteriorly in the knee.  1+ crepitus with range of motion.  Negative Lachman and posterior drawer.  Mild axial varus stress.  No numbness.  Contralateral exam is normal for strength, motion, stability and neurovascular assessment.    Radiographs: X-rays of the right knee at [facility] showed moderate degenerative change.  Duplex ultrasound was negative for DVT but did show a Baker's cyst.    Assessment: Moderate right knee DJD, possible ruptured Baker's cyst    Plan: We again discussed her options for treatment including viscosupplementation and further imaging with a MRI. She would like to get a MRI of the right knee at this point. We will put in the order for that today and will see her back afterwards to discuss the results.  We will need to assess her MRI critically look for worsening arthritic change in addition to likely internal derangement.  We will consider gel injections in the future if pain continues. She can continue to ice the knee as well.   All questions were answered today with the patient.    Scribe Attestation:  By signing my name below, Verna HERNADEZ Scribe attest that this documentation has been prepared under the direction and in the presence of Medardo Still MD.    Provider Attestation - Scribe documentation:  All medical  record entries made by Verna Cárdenas were at my direction and personally dictated by me, Medardo Still MD. I have reviewed the chart and agree that the record is accurate and I confirmed that it reflects my personal performance of the history, physical exam, discussion, and plan.

## 2025-06-02 ENCOUNTER — HOSPITAL ENCOUNTER (OUTPATIENT)
Dept: RADIOLOGY | Facility: CLINIC | Age: 65
Discharge: HOME | End: 2025-06-02
Payer: MEDICARE

## 2025-06-02 ENCOUNTER — OFFICE VISIT (OUTPATIENT)
Dept: ORTHOPEDIC SURGERY | Facility: CLINIC | Age: 65
End: 2025-06-02
Payer: MEDICARE

## 2025-06-02 DIAGNOSIS — M17.10 ARTHRITIS OF KNEE: ICD-10-CM

## 2025-06-02 PROCEDURE — 99212 OFFICE O/P EST SF 10 MIN: CPT | Performed by: ORTHOPAEDIC SURGERY

## 2025-06-02 PROCEDURE — 73560 X-RAY EXAM OF KNEE 1 OR 2: CPT | Mod: RT

## 2025-06-02 PROCEDURE — 1159F MED LIST DOCD IN RCRD: CPT | Performed by: ORTHOPAEDIC SURGERY

## 2025-06-02 PROCEDURE — 99213 OFFICE O/P EST LOW 20 MIN: CPT | Performed by: ORTHOPAEDIC SURGERY

## 2025-06-02 PROCEDURE — 73560 X-RAY EXAM OF KNEE 1 OR 2: CPT | Mod: RIGHT SIDE | Performed by: ORTHOPAEDIC SURGERY

## 2025-06-21 ENCOUNTER — TELEPHONE (OUTPATIENT)
Dept: PRIMARY CARE | Facility: CLINIC | Age: 65
End: 2025-06-21
Payer: COMMERCIAL

## 2025-06-21 NOTE — TELEPHONE ENCOUNTER
Insurance is requesting a copy of an office note that states patient has been benefiting from use of the TENS unit she received on 4/30/2025.  A copy of this note has been faxed to Gayla at [phone].

## 2025-06-27 ENCOUNTER — HOSPITAL ENCOUNTER (OUTPATIENT)
Dept: RADIOLOGY | Facility: HOSPITAL | Age: 65
Discharge: HOME | End: 2025-06-27
Payer: MEDICARE

## 2025-06-27 ENCOUNTER — APPOINTMENT (OUTPATIENT)
Dept: RADIOLOGY | Facility: HOSPITAL | Age: 65
End: 2025-06-27
Payer: COMMERCIAL

## 2025-06-27 DIAGNOSIS — M17.10 ARTHRITIS OF KNEE: ICD-10-CM

## 2025-06-27 PROCEDURE — 73721 MRI JNT OF LWR EXTRE W/O DYE: CPT | Mod: RT

## 2025-07-02 ENCOUNTER — OFFICE VISIT (OUTPATIENT)
Dept: ORTHOPEDIC SURGERY | Facility: CLINIC | Age: 65
End: 2025-07-02
Payer: MEDICARE

## 2025-07-02 DIAGNOSIS — S83.231A COMPLEX TEAR OF MEDIAL MENISCUS OF RIGHT KNEE AS CURRENT INJURY, INITIAL ENCOUNTER: ICD-10-CM

## 2025-07-02 DIAGNOSIS — M17.10 ARTHRITIS OF KNEE: Primary | ICD-10-CM

## 2025-07-02 DIAGNOSIS — M71.21 BAKER'S CYST OF KNEE, RIGHT: ICD-10-CM

## 2025-07-02 PROCEDURE — 99212 OFFICE O/P EST SF 10 MIN: CPT | Performed by: ORTHOPAEDIC SURGERY

## 2025-07-23 DIAGNOSIS — S83.231A COMPLEX TEAR OF MEDIAL MENISCUS, CURRENT INJURY, RIGHT KNEE, INITIAL ENCOUNTER: ICD-10-CM

## 2025-07-24 LAB
ALBUMIN SERPL-MCNC: 4.3 G/DL (ref 3.6–5.1)
ALP SERPL-CCNC: 74 U/L (ref 37–153)
ALT SERPL-CCNC: 14 U/L (ref 6–29)
ANION GAP SERPL CALCULATED.4IONS-SCNC: 11 MMOL/L (CALC) (ref 7–17)
AST SERPL-CCNC: 16 U/L (ref 10–35)
BILIRUB SERPL-MCNC: 0.7 MG/DL (ref 0.2–1.2)
BUN SERPL-MCNC: 24 MG/DL (ref 7–25)
CALCIUM SERPL-MCNC: 9.2 MG/DL (ref 8.6–10.4)
CHLORIDE SERPL-SCNC: 105 MMOL/L (ref 98–110)
CHOLEST SERPL-MCNC: 150 MG/DL
CHOLEST/HDLC SERPL: 3.6 (CALC)
CO2 SERPL-SCNC: 24 MMOL/L (ref 20–32)
CREAT SERPL-MCNC: 1.2 MG/DL (ref 0.5–1.05)
EGFRCR SERPLBLD CKD-EPI 2021: 50 ML/MIN/1.73M2
GLUCOSE SERPL-MCNC: 120 MG/DL (ref 65–99)
HDLC SERPL-MCNC: 42 MG/DL
LDLC SERPL CALC-MCNC: 73 MG/DL (CALC)
NONHDLC SERPL-MCNC: 108 MG/DL (CALC)
POTASSIUM SERPL-SCNC: 4.4 MMOL/L (ref 3.5–5.3)
PROT SERPL-MCNC: 7 G/DL (ref 6.1–8.1)
SODIUM SERPL-SCNC: 140 MMOL/L (ref 135–146)
T4 FREE SERPL-MCNC: 1.2 NG/DL (ref 0.8–1.8)
TRIGL SERPL-MCNC: 267 MG/DL
TSH SERPL-ACNC: 1.65 MIU/L (ref 0.4–4.5)

## 2025-07-28 DIAGNOSIS — E03.9 ACQUIRED HYPOTHYROIDISM: ICD-10-CM

## 2025-07-28 DIAGNOSIS — I10 ESSENTIAL HYPERTENSION: ICD-10-CM

## 2025-07-28 DIAGNOSIS — E78.2 MIXED HYPERLIPIDEMIA: ICD-10-CM

## 2025-07-31 ENCOUNTER — APPOINTMENT (OUTPATIENT)
Dept: PRIMARY CARE | Facility: CLINIC | Age: 65
End: 2025-07-31
Payer: MEDICARE

## 2025-07-31 ENCOUNTER — APPOINTMENT (OUTPATIENT)
Dept: LAB | Facility: HOSPITAL | Age: 65
End: 2025-07-31
Payer: MEDICARE

## 2025-07-31 VITALS
DIASTOLIC BLOOD PRESSURE: 62 MMHG | RESPIRATION RATE: 18 BRPM | WEIGHT: 248 LBS | HEIGHT: 64 IN | TEMPERATURE: 96.6 F | SYSTOLIC BLOOD PRESSURE: 120 MMHG | BODY MASS INDEX: 42.34 KG/M2 | OXYGEN SATURATION: 95 % | HEART RATE: 84 BPM

## 2025-07-31 DIAGNOSIS — E66.01 MORBID OBESITY WITH BMI OF 40.0-44.9, ADULT (MULTI): ICD-10-CM

## 2025-07-31 DIAGNOSIS — R07.89 ATYPICAL CHEST PAIN: ICD-10-CM

## 2025-07-31 DIAGNOSIS — S83.231D COMPLEX TEAR OF MEDIAL MENISCUS, CURRENT INJURY, RIGHT KNEE, SUBSEQUENT ENCOUNTER: Primary | ICD-10-CM

## 2025-07-31 DIAGNOSIS — Z01.818 PREOPERATIVE CLEARANCE: ICD-10-CM

## 2025-07-31 DIAGNOSIS — Z01.810 ENCOUNTER FOR PRE-OPERATIVE CARDIOVASCULAR CLEARANCE: ICD-10-CM

## 2025-07-31 DIAGNOSIS — E03.9 ACQUIRED HYPOTHYROIDISM: ICD-10-CM

## 2025-07-31 DIAGNOSIS — I10 ESSENTIAL HYPERTENSION: ICD-10-CM

## 2025-07-31 DIAGNOSIS — E78.2 MIXED HYPERLIPIDEMIA: ICD-10-CM

## 2025-07-31 LAB
ALBUMIN SERPL BCP-MCNC: 4.3 G/DL (ref 3.4–5)
ALP SERPL-CCNC: 71 U/L (ref 33–136)
ALT SERPL W P-5'-P-CCNC: 14 U/L (ref 7–45)
ANION GAP SERPL CALC-SCNC: 16 MMOL/L (ref 10–20)
APPEARANCE UR: CLEAR
AST SERPL W P-5'-P-CCNC: 16 U/L (ref 9–39)
BASOPHILS # BLD AUTO: 0.08 X10*3/UL (ref 0–0.1)
BASOPHILS NFR BLD AUTO: 1.2 %
BILIRUB SERPL-MCNC: 0.6 MG/DL (ref 0–1.2)
BILIRUB UR STRIP.AUTO-MCNC: NEGATIVE MG/DL
BUN SERPL-MCNC: 22 MG/DL (ref 6–23)
CALCIUM SERPL-MCNC: 9.1 MG/DL (ref 8.6–10.3)
CHLORIDE SERPL-SCNC: 103 MMOL/L (ref 98–107)
CO2 SERPL-SCNC: 26 MMOL/L (ref 21–32)
COLOR UR: YELLOW
CREAT SERPL-MCNC: 1.15 MG/DL (ref 0.5–1.05)
EGFRCR SERPLBLD CKD-EPI 2021: 53 ML/MIN/1.73M*2
EOSINOPHIL # BLD AUTO: 0.36 X10*3/UL (ref 0–0.7)
EOSINOPHIL NFR BLD AUTO: 5.4 %
ERYTHROCYTE [DISTWIDTH] IN BLOOD BY AUTOMATED COUNT: 14.3 % (ref 11.5–14.5)
GLUCOSE SERPL-MCNC: 102 MG/DL (ref 74–99)
GLUCOSE UR STRIP.AUTO-MCNC: NORMAL MG/DL
HCT VFR BLD AUTO: 38.1 % (ref 36–46)
HGB BLD-MCNC: 12.3 G/DL (ref 12–16)
IMM GRANULOCYTES # BLD AUTO: 0.02 X10*3/UL (ref 0–0.7)
IMM GRANULOCYTES NFR BLD AUTO: 0.3 % (ref 0–0.9)
INR PPP: 0.9 (ref 0.9–1.1)
KETONES UR STRIP.AUTO-MCNC: NEGATIVE MG/DL
LEUKOCYTE ESTERASE UR QL STRIP.AUTO: NEGATIVE
LYMPHOCYTES # BLD AUTO: 1.85 X10*3/UL (ref 1.2–4.8)
LYMPHOCYTES NFR BLD AUTO: 27.9 %
MCH RBC QN AUTO: 31.5 PG (ref 26–34)
MCHC RBC AUTO-ENTMCNC: 32.3 G/DL (ref 32–36)
MCV RBC AUTO: 97 FL (ref 80–100)
MONOCYTES # BLD AUTO: 0.53 X10*3/UL (ref 0.1–1)
MONOCYTES NFR BLD AUTO: 8 %
NEUTROPHILS # BLD AUTO: 3.79 X10*3/UL (ref 1.2–7.7)
NEUTROPHILS NFR BLD AUTO: 57.2 %
NITRITE UR QL STRIP.AUTO: NEGATIVE
NRBC BLD-RTO: 0 /100 WBCS (ref 0–0)
PH UR STRIP.AUTO: 5.5 [PH]
PLATELET # BLD AUTO: 252 X10*3/UL (ref 150–450)
POTASSIUM SERPL-SCNC: 4.4 MMOL/L (ref 3.5–5.3)
PROT SERPL-MCNC: 6.8 G/DL (ref 6.4–8.2)
PROT UR STRIP.AUTO-MCNC: NEGATIVE MG/DL
PROTHROMBIN TIME: 10.3 SECONDS (ref 9.8–12.4)
RBC # BLD AUTO: 3.91 X10*6/UL (ref 4–5.2)
RBC # UR STRIP.AUTO: NEGATIVE MG/DL
SODIUM SERPL-SCNC: 141 MMOL/L (ref 136–145)
SP GR UR STRIP.AUTO: 1.02
UROBILINOGEN UR STRIP.AUTO-MCNC: NORMAL MG/DL
WBC # BLD AUTO: 6.6 X10*3/UL (ref 4.4–11.3)

## 2025-07-31 PROCEDURE — 99214 OFFICE O/P EST MOD 30 MIN: CPT | Performed by: FAMILY MEDICINE

## 2025-07-31 PROCEDURE — 3078F DIAST BP <80 MM HG: CPT | Performed by: FAMILY MEDICINE

## 2025-07-31 PROCEDURE — G2211 COMPLEX E/M VISIT ADD ON: HCPCS | Performed by: FAMILY MEDICINE

## 2025-07-31 PROCEDURE — 1159F MED LIST DOCD IN RCRD: CPT | Performed by: FAMILY MEDICINE

## 2025-07-31 PROCEDURE — 3074F SYST BP LT 130 MM HG: CPT | Performed by: FAMILY MEDICINE

## 2025-07-31 PROCEDURE — 1036F TOBACCO NON-USER: CPT | Performed by: FAMILY MEDICINE

## 2025-07-31 PROCEDURE — 1160F RVW MEDS BY RX/DR IN RCRD: CPT | Performed by: FAMILY MEDICINE

## 2025-07-31 PROCEDURE — 3008F BODY MASS INDEX DOCD: CPT | Performed by: FAMILY MEDICINE

## 2025-07-31 PROCEDURE — 85610 PROTHROMBIN TIME: CPT

## 2025-07-31 PROCEDURE — 85025 COMPLETE CBC W/AUTO DIFF WBC: CPT

## 2025-07-31 PROCEDURE — 80053 COMPREHEN METABOLIC PANEL: CPT

## 2025-07-31 PROCEDURE — 81003 URINALYSIS AUTO W/O SCOPE: CPT

## 2025-07-31 ASSESSMENT — ENCOUNTER SYMPTOMS
SHORTNESS OF BREATH: 0
POLYDIPSIA: 0
NUMBNESS: 0
CHILLS: 0
ABDOMINAL PAIN: 0
TREMORS: 0
DIARRHEA: 0
COUGH: 0
POLYPHAGIA: 0
VOMITING: 0
DIZZINESS: 0
FREQUENCY: 0
PALPITATIONS: 0
HEADACHES: 0
DYSURIA: 0
FEVER: 0
RHINORRHEA: 0
HEMATURIA: 0
WHEEZING: 0
CONSTIPATION: 0
SORE THROAT: 0

## 2025-07-31 NOTE — PATIENT INSTRUCTIONS
BMI was above normal measurement. Current weight: 112 kg (248 lb)  Weight change since last visit (-) denotes wt loss 0 lbs   Weight loss needed to achieve BMI 25: 102.7 Lbs  Weight loss needed to achieve BMI 30: 73.6 Lbs  Advised to Increase physical activity.   Clindamycin Counseling: I counseled the patient regarding use of clindamycin as an antibiotic for prophylactic and/or therapeutic purposes. Clindamycin is active against numerous classes of bacteria, including skin bacteria. Side effects may include nausea, diarrhea, gastrointestinal upset, rash, hives, yeast infections, and in rare cases, colitis.

## 2025-07-31 NOTE — PROGRESS NOTES
"Subjective   Patient ID: Kiara Ordaz is a 64 y.o. female who presents for Pre-op Exam and Follow-up (Hypertension, Hyperlipidemia, Hypothyroidism and labs).    This patient was referred to me by Dr. Medardo Still for pre-op evaluation prior to Right Knee: Arthroscopy/ Medial Meniscectomy which is scheduled for 8/21/2025 at [facility].       Patient is here for follow-up on hypertension and hyperlipidemia. She has no dyspnea, exertional chest pressure/discomfort, near-syncope, orthopnea, palpitations, paroxysmal nocturnal dyspnea, and syncope. Taking her medication regularly with no side effects.    She presents for follow up of hypothyroidism. Current symptoms: none. She has no change in energy level, diarrhea, heat / cold intolerance, nervousness, palpitations, or weight changes.     She complains of intermittent chest pain for the past week. She states the pain only lasts for a few seconds. She has no shortness of breath or exertional chest pain.       Review of Systems   Constitutional:  Negative for chills and fever.   HENT:  Negative for congestion, ear pain, nosebleeds, rhinorrhea and sore throat.    Respiratory:  Negative for cough, shortness of breath and wheezing.    Cardiovascular:  Negative for chest pain, palpitations and leg swelling.   Gastrointestinal:  Negative for abdominal pain, constipation, diarrhea and vomiting.   Endocrine: Negative for cold intolerance, heat intolerance, polydipsia, polyphagia and polyuria.   Genitourinary:  Negative for dysuria, frequency and hematuria.   Neurological:  Negative for dizziness, tremors, numbness and headaches.       Objective   /62   Pulse 84   Temp 35.9 °C (96.6 °F)   Resp 18   Ht 1.626 m (5' 4\")   Wt 112 kg (248 lb)   SpO2 95%   BMI 42.57 kg/m²     Physical Exam  Constitutional:       General: She is not in acute distress.     Appearance: Normal appearance.   HENT:      Head: Normocephalic and atraumatic.      " Mouth/Throat:      Mouth: Mucous membranes are moist.      Pharynx: Oropharynx is clear. No oropharyngeal exudate or posterior oropharyngeal erythema.     Eyes:      General: No scleral icterus.     Extraocular Movements: Extraocular movements intact.      Pupils: Pupils are equal, round, and reactive to light.       Cardiovascular:      Rate and Rhythm: Normal rate and regular rhythm.      Pulses: Normal pulses.      Heart sounds: No murmur heard.     No friction rub. No gallop.   Pulmonary:      Effort: Pulmonary effort is normal.      Breath sounds: No wheezing, rhonchi or rales.     Musculoskeletal:      Right lower leg: No edema.      Left lower leg: No edema.     Skin:     General: Skin is warm.      Coloration: Skin is not jaundiced or pale.      Findings: No erythema or rash.     Neurological:      General: No focal deficit present.      Mental Status: She is alert and oriented to person, place, and time.      Cranial Nerves: No cranial nerve deficit.      Sensory: No sensory deficit.      Coordination: Coordination normal.      Gait: Gait normal.         Orders Only on 07/28/2025   Component Date Value Ref Range Status    GLUCOSE 07/24/2025 120 (H)  65 - 99 mg/dL Final    Comment:               Fasting reference interval     For someone without known diabetes, a glucose value  between 100 and 125 mg/dL is consistent with  prediabetes and should be confirmed with a  follow-up test.         UREA NITROGEN (BUN) 07/24/2025 24  7 - 25 mg/dL Final    CREATININE 07/24/2025 1.20 (H)  0.50 - 1.05 mg/dL Final    EGFR 07/24/2025 50 (L)  > OR = 60 mL/min/1.73m2 Final    SODIUM 07/24/2025 140  135 - 146 mmol/L Final    POTASSIUM 07/24/2025 4.4  3.5 - 5.3 mmol/L Final    CHLORIDE 07/24/2025 105  98 - 110 mmol/L Final    CARBON DIOXIDE 07/24/2025 24  20 - 32 mmol/L Final    ELECTROLYTE BALANCE 07/24/2025 11  7 - 17 mmol/L (calc) Final    CALCIUM 07/24/2025 9.2  8.6 - 10.4 mg/dL Final    PROTEIN, TOTAL 07/24/2025 7.0  6.1  - 8.1 g/dL Final    ALBUMIN 07/24/2025 4.3  3.6 - 5.1 g/dL Final    BILIRUBIN, TOTAL 07/24/2025 0.7  0.2 - 1.2 mg/dL Final    ALKALINE PHOSPHATASE 07/24/2025 74  37 - 153 U/L Final    AST 07/24/2025 16  10 - 35 U/L Final    ALT 07/24/2025 14  6 - 29 U/L Final    CHOLESTEROL, TOTAL 07/24/2025 150  <200 mg/dL Final    HDL CHOLESTEROL 07/24/2025 42 (L)  > OR = 50 mg/dL Final    TRIGLYCERIDES 07/24/2025 267 (H)  <150 mg/dL Final    Comment:    If a non-fasting specimen was collected, consider  repeat triglyceride testing on a fasting specimen  if clinically indicated.   Gene et al. J. of Clin. Lipidol. 2015;9:129-169.         LDL-CHOLESTEROL 07/24/2025 73  mg/dL (calc) Final    Comment: Reference range: <100     Desirable range <100 mg/dL for primary prevention;    <70 mg/dL for patients with CHD or diabetic patients   with > or = 2 CHD risk factors.     LDL-C is now calculated using the Ramses-Wheat   calculation, which is a validated novel method providing   better accuracy than the Friedewald equation in the   estimation of LDL-C.   Ramses SS et al. PATRICK. 2013;310(19): 9746-6146   (http://education.[employer].Moleculin/faq/SPM709)      CHOL/HDLC RATIO 07/24/2025 3.6  <5.0 (calc) Final    NON HDL CHOLESTEROL 07/24/2025 108  <130 mg/dL (calc) Final    Comment: For patients with diabetes plus 1 major ASCVD risk   factor, treating to a non-HDL-C goal of <100 mg/dL   (LDL-C of <70 mg/dL) is considered a therapeutic   option.      TSH 07/24/2025 1.65  0.40 - 4.50 mIU/L Final    T4, FREE 07/24/2025 1.2  0.8 - 1.8 ng/dL Final     Assessment/Plan   Problem List Items Addressed This Visit       Acquired hypothyroidism    Well-controlled, continue current medications and management.         Relevant Orders    Follow Up In [facility] - PCP    Lipid Panel    Thyroid Stimulating Hormone    Thyroxine, Free    Atypical chest pain    Follow up with Dr. Fitz         Complex tear of medial meniscus, current injury,  "right knee, subsequent encounter - Primary    Follow up with ortho as scheduled.         Essential hypertension    Well-controlled, continue current medications and management.  Dietary sodium restriction.  Regular aerobic exercise program is recommended to help achieve and maintain normal body weight, fitness and improve lipid balance. .  Dietary changes: Increase soluble fiber  Plant sterols 2grams per day (e.g. Benecol)  Reduce saturated fat, \"trans\" monounsaturated fatty acids, and cholesterol         Relevant Orders    Comprehensive Metabolic Panel    Follow Up In [facility] - PCP    Lipid Panel    CBC and Auto Differential    Mixed hyperlipidemia    The nature of cardiac risk has been fully discussed with this patient. Discussed cardiovascular risk analysis and appropriate diet with the need for lifelong measures to reduce the risk. A regular exercise program is recommended to help achieve and maintain normal body weight, fitness and improve lipid balance. Patient education provided. They understand and agree with this course of treatment. They will return with new or worsening symptoms. Patient instructed to remain current with appropriate annual health maintenance.          Relevant Orders    Comprehensive Metabolic Panel    Follow Up In [facility] - PCP    Lipid Panel    CBC and Auto Differential    Morbid obesity with BMI of 40.0-44.9, adult (Multi)    Continue decrease calorie diet and not more than 1500 calorie per day diet and low-fat diet.  Continue with regular exercise program.  We advised exercise at least 5 days a week for at least 45 minutes and also a minimum of 10,000 steps a day.  The detrimental effects of obesity on health were discussed.         Preoperative clearance    Patient to obtain cardiac clearance from Dr. Byrnes          Other Visit Diagnoses         Encounter for pre-operative cardiovascular clearance        Relevant Orders    Referral to Cardiology      "     Scribe Attestation  By signing my name below, I, Becka Oh   attest that this documentation has been prepared under the direction and in the presence of Niall Miranda MD.

## 2025-08-01 LAB — HOLD SPECIMEN: NORMAL

## 2025-08-06 ENCOUNTER — APPOINTMENT (OUTPATIENT)
Dept: ORTHOPEDIC SURGERY | Facility: CLINIC | Age: 65
End: 2025-08-06
Payer: COMMERCIAL

## 2025-08-07 DIAGNOSIS — I10 ESSENTIAL HYPERTENSION: ICD-10-CM

## 2025-08-07 DIAGNOSIS — I25.10 CAD S/P PERCUTANEOUS CORONARY ANGIOPLASTY: ICD-10-CM

## 2025-08-07 DIAGNOSIS — Z98.61 CAD S/P PERCUTANEOUS CORONARY ANGIOPLASTY: ICD-10-CM

## 2025-08-07 RX ORDER — METOPROLOL TARTRATE 25 MG/1
TABLET, FILM COATED ORAL
Qty: 270 TABLET | Refills: 3 | Status: SHIPPED | OUTPATIENT
Start: 2025-08-07

## 2025-08-07 NOTE — TELEPHONE ENCOUNTER
Received request for prescription refills for patient.   Patient follows with Dr. Byrnes    Request is for metoprolol tartrate  Is patient currently on medication yes    Last OV 2/27/2025  Next OV 8/12/2025    Pended for signing and sent to provider

## 2025-08-08 ENCOUNTER — TELEPHONE (OUTPATIENT)
Dept: CARDIOLOGY | Facility: CLINIC | Age: 65
End: 2025-08-08
Payer: COMMERCIAL

## 2025-08-08 NOTE — TELEPHONE ENCOUNTER
I called and left , for patient. I reschedule patient appt with  for 8/12 at 2 pm due to NZF being out. Appt is a surgery clearance.

## 2025-08-11 ENCOUNTER — APPOINTMENT (OUTPATIENT)
Dept: PHYSICAL THERAPY | Facility: CLINIC | Age: 65
End: 2025-08-11
Payer: MEDICARE

## 2025-08-11 ENCOUNTER — OFFICE VISIT (OUTPATIENT)
Dept: ORTHOPEDIC SURGERY | Facility: CLINIC | Age: 65
End: 2025-08-11
Payer: MEDICARE

## 2025-08-11 DIAGNOSIS — G89.18 POST-OPERATIVE PAIN: ICD-10-CM

## 2025-08-11 DIAGNOSIS — S83.231A COMPLEX TEAR OF MEDIAL MENISCUS OF RIGHT KNEE AS CURRENT INJURY, INITIAL ENCOUNTER: Primary | ICD-10-CM

## 2025-08-11 DIAGNOSIS — M25.561 ACUTE PAIN OF RIGHT KNEE: Primary | ICD-10-CM

## 2025-08-11 PROCEDURE — 99212 OFFICE O/P EST SF 10 MIN: CPT | Performed by: PHYSICIAN ASSISTANT

## 2025-08-11 PROCEDURE — 1159F MED LIST DOCD IN RCRD: CPT | Performed by: PHYSICIAN ASSISTANT

## 2025-08-11 PROCEDURE — 1036F TOBACCO NON-USER: CPT | Performed by: PHYSICIAN ASSISTANT

## 2025-08-11 RX ORDER — OXYCODONE AND ACETAMINOPHEN 5; 325 MG/1; MG/1
1 TABLET ORAL EVERY 6 HOURS PRN
Qty: 12 TABLET | Refills: 0 | Status: SHIPPED | OUTPATIENT
Start: 2025-08-11 | End: 2025-08-14

## 2025-08-12 ENCOUNTER — OFFICE VISIT (OUTPATIENT)
Dept: CARDIOLOGY | Facility: CLINIC | Age: 65
End: 2025-08-12
Payer: MEDICARE

## 2025-08-12 ENCOUNTER — APPOINTMENT (OUTPATIENT)
Dept: CARDIOLOGY | Facility: CLINIC | Age: 65
End: 2025-08-12
Payer: MEDICARE

## 2025-08-12 VITALS
HEIGHT: 64 IN | BODY MASS INDEX: 42.32 KG/M2 | SYSTOLIC BLOOD PRESSURE: 132 MMHG | HEART RATE: 67 BPM | DIASTOLIC BLOOD PRESSURE: 82 MMHG | WEIGHT: 247.9 LBS

## 2025-08-12 DIAGNOSIS — I10 ESSENTIAL HYPERTENSION: ICD-10-CM

## 2025-08-12 DIAGNOSIS — E78.2 MIXED HYPERLIPIDEMIA: ICD-10-CM

## 2025-08-12 DIAGNOSIS — Z78.9 NEVER SMOKED ANY SUBSTANCE: ICD-10-CM

## 2025-08-12 DIAGNOSIS — Z01.818 PRE-OPERATIVE CLEARANCE: ICD-10-CM

## 2025-08-12 DIAGNOSIS — I25.10 CAD S/P PERCUTANEOUS CORONARY ANGIOPLASTY: ICD-10-CM

## 2025-08-12 DIAGNOSIS — Z98.61 CAD S/P PERCUTANEOUS CORONARY ANGIOPLASTY: ICD-10-CM

## 2025-08-12 DIAGNOSIS — I25.2 STATUS POST MYOCARDIAL INFARCTION: ICD-10-CM

## 2025-08-12 PROCEDURE — 93000 ELECTROCARDIOGRAM COMPLETE: CPT | Performed by: INTERNAL MEDICINE

## 2025-08-12 PROCEDURE — 3079F DIAST BP 80-89 MM HG: CPT | Performed by: INTERNAL MEDICINE

## 2025-08-12 PROCEDURE — 1159F MED LIST DOCD IN RCRD: CPT | Performed by: INTERNAL MEDICINE

## 2025-08-12 PROCEDURE — 3075F SYST BP GE 130 - 139MM HG: CPT | Performed by: INTERNAL MEDICINE

## 2025-08-12 PROCEDURE — 1036F TOBACCO NON-USER: CPT | Performed by: INTERNAL MEDICINE

## 2025-08-12 PROCEDURE — 99215 OFFICE O/P EST HI 40 MIN: CPT | Performed by: INTERNAL MEDICINE

## 2025-08-12 PROCEDURE — 3008F BODY MASS INDEX DOCD: CPT | Performed by: INTERNAL MEDICINE

## 2025-08-13 DIAGNOSIS — M1A.00X0 CHRONIC IDIOPATHIC GOUT: ICD-10-CM

## 2025-08-13 DIAGNOSIS — K21.9 GASTROESOPHAGEAL REFLUX DISEASE WITHOUT ESOPHAGITIS: ICD-10-CM

## 2025-08-13 DIAGNOSIS — I10 ESSENTIAL HYPERTENSION: ICD-10-CM

## 2025-08-13 RX ORDER — PANTOPRAZOLE SODIUM 40 MG/1
40 TABLET, DELAYED RELEASE ORAL DAILY
Qty: 90 TABLET | Refills: 0 | Status: SHIPPED | OUTPATIENT
Start: 2025-08-13

## 2025-08-13 RX ORDER — ALLOPURINOL 300 MG/1
300 TABLET ORAL DAILY
Qty: 90 TABLET | Refills: 0 | Status: SHIPPED | OUTPATIENT
Start: 2025-08-13

## 2025-08-13 RX ORDER — HYDROCHLOROTHIAZIDE 25 MG/1
25 TABLET ORAL DAILY
Qty: 90 TABLET | Refills: 0 | Status: SHIPPED | OUTPATIENT
Start: 2025-08-13

## 2025-08-25 ENCOUNTER — APPOINTMENT (OUTPATIENT)
Dept: PRIMARY CARE | Facility: CLINIC | Age: 65
End: 2025-08-25
Payer: COMMERCIAL

## 2025-08-26 ENCOUNTER — APPOINTMENT (OUTPATIENT)
Dept: CARDIOLOGY | Facility: HOSPITAL | Age: 65
DRG: 175 | End: 2025-08-26
Payer: MEDICARE

## 2025-08-26 ENCOUNTER — APPOINTMENT (OUTPATIENT)
Dept: RADIOLOGY | Facility: HOSPITAL | Age: 65
DRG: 175 | End: 2025-08-26
Payer: MEDICARE

## 2025-08-26 ENCOUNTER — HOSPITAL ENCOUNTER (INPATIENT)
Facility: HOSPITAL | Age: 65
LOS: 4 days | Discharge: HOME | DRG: 175 | End: 2025-08-30
Attending: STUDENT IN AN ORGANIZED HEALTH CARE EDUCATION/TRAINING PROGRAM | Admitting: HOSPITALIST
Payer: MEDICARE

## 2025-08-26 PROBLEM — I26.99 BILATERAL PULMONARY EMBOLISM (MULTI): Status: ACTIVE | Noted: 2025-08-26

## 2025-08-26 PROBLEM — I26.09 ACUTE PULMONARY EMBOLISM WITH ACUTE COR PULMONALE (MULTI): Status: ACTIVE | Noted: 2025-08-26

## 2025-08-26 SDOH — SOCIAL STABILITY: SOCIAL INSECURITY: HAVE YOU HAD THOUGHTS OF HARMING ANYONE ELSE?: NO

## 2025-08-26 SDOH — ECONOMIC STABILITY: INCOME INSECURITY: IN THE PAST 12 MONTHS HAS THE ELECTRIC, GAS, OIL, OR WATER COMPANY THREATENED TO SHUT OFF SERVICES IN YOUR HOME?: NO

## 2025-08-26 SDOH — SOCIAL STABILITY: SOCIAL INSECURITY
WITHIN THE LAST YEAR, HAVE YOU BEEN RAPED OR FORCED TO HAVE ANY KIND OF SEXUAL ACTIVITY BY YOUR PARTNER OR EX-PARTNER?: NO

## 2025-08-26 SDOH — ECONOMIC STABILITY: TRANSPORTATION INSECURITY: IN THE PAST 12 MONTHS, HAS LACK OF TRANSPORTATION KEPT YOU FROM MEDICAL APPOINTMENTS OR FROM GETTING MEDICATIONS?: NO

## 2025-08-26 SDOH — ECONOMIC STABILITY: HOUSING INSECURITY: IN THE LAST 12 MONTHS, WAS THERE A TIME WHEN YOU WERE NOT ABLE TO PAY THE MORTGAGE OR RENT ON TIME?: NO

## 2025-08-26 SDOH — ECONOMIC STABILITY: FOOD INSECURITY: HOW HARD IS IT FOR YOU TO PAY FOR THE VERY BASICS LIKE FOOD, HOUSING, MEDICAL CARE, AND HEATING?: NOT HARD AT ALL

## 2025-08-26 SDOH — SOCIAL STABILITY: SOCIAL INSECURITY: WITHIN THE LAST YEAR, HAVE YOU BEEN AFRAID OF YOUR PARTNER OR EX-PARTNER?: NO

## 2025-08-26 SDOH — ECONOMIC STABILITY: HOUSING INSECURITY: AT ANY TIME IN THE PAST 12 MONTHS, WERE YOU HOMELESS OR LIVING IN A SHELTER (INCLUDING NOW)?: NO

## 2025-08-26 SDOH — ECONOMIC STABILITY: FOOD INSECURITY: WITHIN THE PAST 12 MONTHS, YOU WORRIED THAT YOUR FOOD WOULD RUN OUT BEFORE YOU GOT THE MONEY TO BUY MORE.: NEVER TRUE

## 2025-08-26 SDOH — SOCIAL STABILITY: SOCIAL INSECURITY: WITHIN THE LAST YEAR, HAVE YOU BEEN HUMILIATED OR EMOTIONALLY ABUSED IN OTHER WAYS BY YOUR PARTNER OR EX-PARTNER?: NO

## 2025-08-26 SDOH — ECONOMIC STABILITY: HOUSING INSECURITY: IN THE PAST 12 MONTHS, HOW MANY TIMES HAVE YOU MOVED WHERE YOU WERE LIVING?: 0

## 2025-08-26 SDOH — SOCIAL STABILITY: SOCIAL INSECURITY: ARE YOU OR HAVE YOU BEEN THREATENED OR ABUSED PHYSICALLY, EMOTIONALLY, OR SEXUALLY BY ANYONE?: NO

## 2025-08-26 SDOH — SOCIAL STABILITY: SOCIAL INSECURITY: DO YOU FEEL ANYONE HAS EXPLOITED OR TAKEN ADVANTAGE OF YOU FINANCIALLY OR OF YOUR PERSONAL PROPERTY?: NO

## 2025-08-26 SDOH — ECONOMIC STABILITY: FOOD INSECURITY: WITHIN THE PAST 12 MONTHS, THE FOOD YOU BOUGHT JUST DIDN'T LAST AND YOU DIDN'T HAVE MONEY TO GET MORE.: NEVER TRUE

## 2025-08-26 SDOH — SOCIAL STABILITY: SOCIAL INSECURITY: HAVE YOU HAD ANY THOUGHTS OF HARMING ANYONE ELSE?: NO

## 2025-08-26 SDOH — SOCIAL STABILITY: SOCIAL INSECURITY: ABUSE: ADULT

## 2025-08-26 SDOH — SOCIAL STABILITY: SOCIAL INSECURITY: DOES ANYONE TRY TO KEEP YOU FROM HAVING/CONTACTING OTHER FRIENDS OR DOING THINGS OUTSIDE YOUR HOME?: NO

## 2025-08-26 SDOH — SOCIAL STABILITY: SOCIAL INSECURITY: WERE YOU ABLE TO COMPLETE ALL THE BEHAVIORAL HEALTH SCREENINGS?: YES

## 2025-08-26 SDOH — SOCIAL STABILITY: SOCIAL INSECURITY: HAS ANYONE EVER THREATENED TO HURT YOUR FAMILY OR YOUR PETS?: NO

## 2025-08-26 SDOH — SOCIAL STABILITY: SOCIAL INSECURITY: DO YOU FEEL UNSAFE GOING BACK TO THE PLACE WHERE YOU ARE LIVING?: NO

## 2025-08-26 SDOH — SOCIAL STABILITY: SOCIAL INSECURITY: ARE THERE ANY APPARENT SIGNS OF INJURIES/BEHAVIORS THAT COULD BE RELATED TO ABUSE/NEGLECT?: NO

## 2025-08-26 ASSESSMENT — COGNITIVE AND FUNCTIONAL STATUS - GENERAL
MOBILITY SCORE: 20
CLIMB 3 TO 5 STEPS WITH RAILING: A LOT
CLIMB 3 TO 5 STEPS WITH RAILING: A LITTLE
DAILY ACTIVITIY SCORE: 24
STANDING UP FROM CHAIR USING ARMS: A LITTLE
PATIENT BASELINE BEDBOUND: NO
MOVING TO AND FROM BED TO CHAIR: A LITTLE
MOVING TO AND FROM BED TO CHAIR: A LITTLE
STANDING UP FROM CHAIR USING ARMS: A LITTLE
WALKING IN HOSPITAL ROOM: A LITTLE
DAILY ACTIVITIY SCORE: 24
WALKING IN HOSPITAL ROOM: A LITTLE
MOBILITY SCORE: 19

## 2025-08-26 ASSESSMENT — PAIN SCALES - GENERAL
PAINLEVEL_OUTOF10: 2
PAINLEVEL_OUTOF10: 2
PAINLEVEL_OUTOF10: 0 - NO PAIN
PAINLEVEL_OUTOF10: 1

## 2025-08-26 ASSESSMENT — LIFESTYLE VARIABLES
HAVE PEOPLE ANNOYED YOU BY CRITICIZING YOUR DRINKING: NO
AUDIT-C TOTAL SCORE: 0
AUDIT-C TOTAL SCORE: 0
HAVE YOU EVER FELT YOU SHOULD CUT DOWN ON YOUR DRINKING: NO
SKIP TO QUESTIONS 9-10: 1
HOW OFTEN DO YOU HAVE 6 OR MORE DRINKS ON ONE OCCASION: NEVER
EVER HAD A DRINK FIRST THING IN THE MORNING TO STEADY YOUR NERVES TO GET RID OF A HANGOVER: NO
HOW MANY STANDARD DRINKS CONTAINING ALCOHOL DO YOU HAVE ON A TYPICAL DAY: PATIENT DOES NOT DRINK
EVER FELT BAD OR GUILTY ABOUT YOUR DRINKING: NO
TOTAL SCORE: 0
HOW OFTEN DO YOU HAVE A DRINK CONTAINING ALCOHOL: NEVER

## 2025-08-26 ASSESSMENT — ACTIVITIES OF DAILY LIVING (ADL)
BATHING: INDEPENDENT
FEEDING YOURSELF: INDEPENDENT
TOILETING: INDEPENDENT
HEARING - RIGHT EAR: FUNCTIONAL
PATIENT'S MEMORY ADEQUATE TO SAFELY COMPLETE DAILY ACTIVITIES?: YES
JUDGMENT_ADEQUATE_SAFELY_COMPLETE_DAILY_ACTIVITIES: YES
HEARING - LEFT EAR: FUNCTIONAL
LACK_OF_TRANSPORTATION: NO
GROOMING: INDEPENDENT
LACK_OF_TRANSPORTATION: NO
DRESSING YOURSELF: INDEPENDENT
WALKS IN HOME: INDEPENDENT
ADEQUATE_TO_COMPLETE_ADL: YES

## 2025-08-26 ASSESSMENT — PATIENT HEALTH QUESTIONNAIRE - PHQ9
1. LITTLE INTEREST OR PLEASURE IN DOING THINGS: NOT AT ALL
2. FEELING DOWN, DEPRESSED OR HOPELESS: SEVERAL DAYS
SUM OF ALL RESPONSES TO PHQ9 QUESTIONS 1 & 2: 1

## 2025-08-26 ASSESSMENT — PAIN - FUNCTIONAL ASSESSMENT
PAIN_FUNCTIONAL_ASSESSMENT: 0-10

## 2025-08-27 ENCOUNTER — APPOINTMENT (OUTPATIENT)
Dept: ORTHOPEDIC SURGERY | Facility: CLINIC | Age: 65
End: 2025-08-27
Payer: COMMERCIAL

## 2025-08-27 ASSESSMENT — COGNITIVE AND FUNCTIONAL STATUS - GENERAL
TURNING FROM BACK TO SIDE WHILE IN FLAT BAD: A LITTLE
CLIMB 3 TO 5 STEPS WITH RAILING: A LOT
MOBILITY SCORE: 22
MOVING TO AND FROM BED TO CHAIR: A LITTLE
STANDING UP FROM CHAIR USING ARMS: A LITTLE
MOVING TO AND FROM BED TO CHAIR: A LITTLE
WALKING IN HOSPITAL ROOM: A LITTLE
MOVING TO AND FROM BED TO CHAIR: A LITTLE
CLIMB 3 TO 5 STEPS WITH RAILING: A LITTLE
HELP NEEDED FOR BATHING: A LOT
PERSONAL GROOMING: A LITTLE
CLIMB 3 TO 5 STEPS WITH RAILING: TOTAL
MOBILITY SCORE: 19
WALKING IN HOSPITAL ROOM: A LITTLE
DAILY ACTIVITIY SCORE: 24
DRESSING REGULAR LOWER BODY CLOTHING: A LITTLE
MOBILITY SCORE: 15
CLIMB 3 TO 5 STEPS WITH RAILING: A LOT
DAILY ACTIVITIY SCORE: 23
DAILY ACTIVITIY SCORE: 17
WALKING IN HOSPITAL ROOM: TOTAL
TOILETING: A LITTLE
MOBILITY SCORE: 19
WALKING IN HOSPITAL ROOM: A LITTLE
DRESSING REGULAR LOWER BODY CLOTHING: A LOT
DAILY ACTIVITIY SCORE: 24
STANDING UP FROM CHAIR USING ARMS: A LITTLE
STANDING UP FROM CHAIR USING ARMS: A LITTLE
DRESSING REGULAR UPPER BODY CLOTHING: A LITTLE

## 2025-08-27 ASSESSMENT — PAIN SCALES - GENERAL
PAINLEVEL_OUTOF10: 0 - NO PAIN

## 2025-08-27 ASSESSMENT — PAIN - FUNCTIONAL ASSESSMENT
PAIN_FUNCTIONAL_ASSESSMENT: 0-10
PAIN_FUNCTIONAL_ASSESSMENT: 0-10

## 2025-08-27 ASSESSMENT — ACTIVITIES OF DAILY LIVING (ADL)
BATHING_ASSISTANCE: MODERATE
LACK_OF_TRANSPORTATION: NO

## 2025-08-28 ENCOUNTER — APPOINTMENT (OUTPATIENT)
Dept: CARDIOLOGY | Facility: HOSPITAL | Age: 65
DRG: 175 | End: 2025-08-28
Payer: MEDICARE

## 2025-08-28 ASSESSMENT — COGNITIVE AND FUNCTIONAL STATUS - GENERAL
WALKING IN HOSPITAL ROOM: A LITTLE
DAILY ACTIVITIY SCORE: 23
CLIMB 3 TO 5 STEPS WITH RAILING: A LITTLE
MOBILITY SCORE: 22
DRESSING REGULAR LOWER BODY CLOTHING: A LITTLE

## 2025-08-28 ASSESSMENT — PAIN SCALES - GENERAL
PAINLEVEL_OUTOF10: 0 - NO PAIN
PAINLEVEL_OUTOF10: 0 - NO PAIN

## 2025-08-29 ENCOUNTER — TELEPHONE (OUTPATIENT)
Dept: PRIMARY CARE | Facility: CLINIC | Age: 65
End: 2025-08-29
Payer: COMMERCIAL

## 2025-08-29 ENCOUNTER — PHARMACY VISIT (OUTPATIENT)
Dept: PHARMACY | Facility: CLINIC | Age: 65
End: 2025-08-29
Payer: COMMERCIAL

## 2025-08-29 PROCEDURE — RXMED WILLOW AMBULATORY MEDICATION CHARGE

## 2025-08-29 RX ORDER — APIXABAN 5 MG/1
5 TABLET, FILM COATED ORAL 2 TIMES DAILY
Qty: 60 TABLET | Refills: 4 | OUTPATIENT
Start: 2025-08-29 | End: 2025-08-30 | Stop reason: HOSPADM

## 2025-08-29 ASSESSMENT — COGNITIVE AND FUNCTIONAL STATUS - GENERAL
WALKING IN HOSPITAL ROOM: A LITTLE
WALKING IN HOSPITAL ROOM: A LITTLE
DAILY ACTIVITIY SCORE: 24
MOVING TO AND FROM BED TO CHAIR: A LITTLE
HELP NEEDED FOR BATHING: A LOT
WALKING IN HOSPITAL ROOM: A LITTLE
CLIMB 3 TO 5 STEPS WITH RAILING: A LITTLE
MOBILITY SCORE: 19
DRESSING REGULAR LOWER BODY CLOTHING: A LITTLE
DAILY ACTIVITIY SCORE: 19
DRESSING REGULAR UPPER BODY CLOTHING: A LITTLE
CLIMB 3 TO 5 STEPS WITH RAILING: A LOT
TOILETING: A LITTLE
STANDING UP FROM CHAIR USING ARMS: A LITTLE
DRESSING REGULAR LOWER BODY CLOTHING: A LITTLE
MOBILITY SCORE: 22
CLIMB 3 TO 5 STEPS WITH RAILING: A LITTLE

## 2025-08-29 ASSESSMENT — ACTIVITIES OF DAILY LIVING (ADL): HOME_MANAGEMENT_TIME_ENTRY: 25

## 2025-08-29 ASSESSMENT — PAIN - FUNCTIONAL ASSESSMENT
PAIN_FUNCTIONAL_ASSESSMENT: 0-10

## 2025-08-29 ASSESSMENT — PAIN SCALES - GENERAL
PAINLEVEL_OUTOF10: 0 - NO PAIN
PAINLEVEL_OUTOF10: 3
PAINLEVEL_OUTOF10: 0 - NO PAIN

## 2025-08-29 ASSESSMENT — PAIN DESCRIPTION - DESCRIPTORS: DESCRIPTORS: ACHING

## 2025-08-30 ASSESSMENT — COGNITIVE AND FUNCTIONAL STATUS - GENERAL
STANDING UP FROM CHAIR USING ARMS: A LITTLE
WALKING IN HOSPITAL ROOM: A LITTLE
MOBILITY SCORE: 19
STANDING UP FROM CHAIR USING ARMS: A LITTLE
MOVING TO AND FROM BED TO CHAIR: A LITTLE
MOVING TO AND FROM BED TO CHAIR: A LITTLE
CLIMB 3 TO 5 STEPS WITH RAILING: A LOT
WALKING IN HOSPITAL ROOM: A LITTLE
CLIMB 3 TO 5 STEPS WITH RAILING: A LOT
STANDING UP FROM CHAIR USING ARMS: A LITTLE
WALKING IN HOSPITAL ROOM: A LITTLE
DAILY ACTIVITIY SCORE: 24
DAILY ACTIVITIY SCORE: 24
CLIMB 3 TO 5 STEPS WITH RAILING: A LOT
MOBILITY SCORE: 19
MOBILITY SCORE: 19
MOVING TO AND FROM BED TO CHAIR: A LITTLE
DAILY ACTIVITIY SCORE: 24

## 2025-08-30 ASSESSMENT — PAIN SCALES - GENERAL: PAINLEVEL_OUTOF10: 2

## 2025-09-02 ENCOUNTER — TELEPHONE (OUTPATIENT)
Dept: PRIMARY CARE | Facility: CLINIC | Age: 65
End: 2025-09-02
Payer: COMMERCIAL

## 2025-09-02 ENCOUNTER — PATIENT OUTREACH (OUTPATIENT)
Dept: PRIMARY CARE | Facility: CLINIC | Age: 65
End: 2025-09-02
Payer: COMMERCIAL

## 2025-09-03 ENCOUNTER — TELEPHONE (OUTPATIENT)
Dept: PRIMARY CARE | Facility: CLINIC | Age: 65
End: 2025-09-03
Payer: COMMERCIAL

## 2025-09-05 ENCOUNTER — OFFICE VISIT (OUTPATIENT)
Dept: PRIMARY CARE | Facility: CLINIC | Age: 65
End: 2025-09-05
Payer: MEDICARE

## 2025-09-05 VITALS
WEIGHT: 242 LBS | DIASTOLIC BLOOD PRESSURE: 62 MMHG | RESPIRATION RATE: 14 BRPM | TEMPERATURE: 97.8 F | SYSTOLIC BLOOD PRESSURE: 112 MMHG | HEART RATE: 77 BPM | OXYGEN SATURATION: 97 % | BODY MASS INDEX: 41.32 KG/M2 | HEIGHT: 64 IN

## 2025-09-05 DIAGNOSIS — I10 ESSENTIAL HYPERTENSION: ICD-10-CM

## 2025-09-05 DIAGNOSIS — E66.01 MORBID OBESITY WITH BMI OF 40.0-44.9, ADULT (MULTI): ICD-10-CM

## 2025-09-05 DIAGNOSIS — J96.01 ACUTE RESPIRATORY FAILURE WITH HYPOXIA: ICD-10-CM

## 2025-09-05 DIAGNOSIS — L23.9 ALLERGIC DERMATITIS: ICD-10-CM

## 2025-09-05 DIAGNOSIS — E78.2 MIXED HYPERLIPIDEMIA: ICD-10-CM

## 2025-09-05 DIAGNOSIS — I27.20 PULMONARY HYPERTENSION (MULTI): ICD-10-CM

## 2025-09-05 DIAGNOSIS — I26.09 ACUTE PULMONARY EMBOLISM WITH ACUTE COR PULMONALE, UNSPECIFIED PULMONARY EMBOLISM TYPE (MULTI): ICD-10-CM

## 2025-09-05 DIAGNOSIS — I26.99 BILATERAL PULMONARY EMBOLISM (MULTI): ICD-10-CM

## 2025-09-05 DIAGNOSIS — Z09 HOSPITAL DISCHARGE FOLLOW-UP: Primary | ICD-10-CM

## 2025-09-05 DIAGNOSIS — E03.9 ACQUIRED HYPOTHYROIDISM: ICD-10-CM

## 2025-09-05 DIAGNOSIS — N17.9 ACUTE KIDNEY INJURY: ICD-10-CM

## 2025-09-05 PROCEDURE — 1159F MED LIST DOCD IN RCRD: CPT | Performed by: FAMILY MEDICINE

## 2025-09-05 PROCEDURE — 1160F RVW MEDS BY RX/DR IN RCRD: CPT | Performed by: FAMILY MEDICINE

## 2025-09-05 PROCEDURE — 1111F DSCHRG MED/CURRENT MED MERGE: CPT | Performed by: FAMILY MEDICINE

## 2025-09-05 PROCEDURE — 99495 TRANSJ CARE MGMT MOD F2F 14D: CPT | Performed by: FAMILY MEDICINE

## 2025-09-05 PROCEDURE — 3008F BODY MASS INDEX DOCD: CPT | Performed by: FAMILY MEDICINE

## 2025-09-05 PROCEDURE — 3074F SYST BP LT 130 MM HG: CPT | Performed by: FAMILY MEDICINE

## 2025-09-05 PROCEDURE — 3078F DIAST BP <80 MM HG: CPT | Performed by: FAMILY MEDICINE

## 2025-09-05 RX ORDER — METHYLPREDNISOLONE 4 MG/1
TABLET ORAL
Qty: 21 TABLET | Refills: 0 | Status: SHIPPED | OUTPATIENT
Start: 2025-09-05 | End: 2025-09-11

## 2025-09-05 RX ORDER — TRIAMCINOLONE ACETONIDE 1 MG/G
CREAM TOPICAL 2 TIMES DAILY
Qty: 160 G | Refills: 0 | Status: SHIPPED | OUTPATIENT
Start: 2025-09-05

## 2025-09-05 ASSESSMENT — ENCOUNTER SYMPTOMS
DIARRHEA: 0
SHORTNESS OF BREATH: 0
NAIL CHANGES: 0
COUGH: 0
VOMITING: 0
FEVER: 0
FATIGUE: 0
ANOREXIA: 0
EYE PAIN: 0
RHINORRHEA: 0
SORE THROAT: 0

## 2025-09-06 PROBLEM — N17.9 ACUTE KIDNEY INJURY: Status: ACTIVE | Noted: 2025-09-06

## 2025-09-06 ASSESSMENT — ENCOUNTER SYMPTOMS
DYSURIA: 0
DIZZINESS: 0
FREQUENCY: 0
ABDOMINAL PAIN: 0
POLYPHAGIA: 0
BRUISES/BLEEDS EASILY: 0
TREMORS: 0
NUMBNESS: 0
CONSTIPATION: 0
POLYDIPSIA: 0
WHEEZING: 0
PALPITATIONS: 0
CHILLS: 0
ADENOPATHY: 0
HEADACHES: 0
HEMATURIA: 0

## 2025-09-08 ENCOUNTER — APPOINTMENT (OUTPATIENT)
Dept: CARDIOLOGY | Facility: CLINIC | Age: 65
End: 2025-09-08
Payer: COMMERCIAL

## 2025-09-09 ENCOUNTER — APPOINTMENT (OUTPATIENT)
Dept: CARDIOLOGY | Facility: CLINIC | Age: 65
End: 2025-09-09
Payer: COMMERCIAL

## 2025-09-19 ENCOUNTER — APPOINTMENT (OUTPATIENT)
Dept: CARDIOLOGY | Facility: CLINIC | Age: 65
End: 2025-09-19
Payer: COMMERCIAL

## 2025-10-07 ENCOUNTER — APPOINTMENT (OUTPATIENT)
Dept: PRIMARY CARE | Facility: CLINIC | Age: 65
End: 2025-10-07
Payer: COMMERCIAL

## 2025-11-04 ENCOUNTER — APPOINTMENT (OUTPATIENT)
Dept: PRIMARY CARE | Facility: CLINIC | Age: 65
End: 2025-11-04
Payer: COMMERCIAL

## 2026-02-26 ENCOUNTER — APPOINTMENT (OUTPATIENT)
Dept: CARDIOLOGY | Facility: CLINIC | Age: 66
End: 2026-02-26
Payer: MEDICARE